# Patient Record
Sex: FEMALE | Race: WHITE | NOT HISPANIC OR LATINO | Employment: OTHER | URBAN - NONMETROPOLITAN AREA
[De-identification: names, ages, dates, MRNs, and addresses within clinical notes are randomized per-mention and may not be internally consistent; named-entity substitution may affect disease eponyms.]

---

## 2017-01-07 ENCOUNTER — TRANSCRIBE ORDERS (OUTPATIENT)
Dept: ADMINISTRATIVE | Facility: HOSPITAL | Age: 72
End: 2017-01-07

## 2017-01-07 DIAGNOSIS — Z12.31 VISIT FOR SCREENING MAMMOGRAM: ICD-10-CM

## 2017-01-07 DIAGNOSIS — Z13.9 SCREENING: Primary | ICD-10-CM

## 2017-02-13 ENCOUNTER — HOSPITAL ENCOUNTER (OUTPATIENT)
Dept: MAMMOGRAPHY | Facility: HOSPITAL | Age: 72
Discharge: HOME | End: 2017-02-13
Attending: OBSTETRICS & GYNECOLOGY | Admitting: OBSTETRICS & GYNECOLOGY

## 2017-02-13 DIAGNOSIS — Z12.31 VISIT FOR SCREENING MAMMOGRAM: ICD-10-CM

## 2017-02-13 PROCEDURE — G0202 SCR MAMMO BI INCL CAD: HCPCS

## 2017-02-13 PROCEDURE — 77063 BREAST TOMOSYNTHESIS BI: CPT

## 2017-03-02 ENCOUNTER — TRANSCRIBE ORDERS (OUTPATIENT)
Dept: ULTRASOUND IMAGING | Facility: HOSPITAL | Age: 72
End: 2017-03-02

## 2017-03-02 DIAGNOSIS — R92.8 ABNORMAL MAMMOGRAM, UNSPECIFIED: Primary | ICD-10-CM

## 2017-03-13 ENCOUNTER — HOSPITAL ENCOUNTER (OUTPATIENT)
Dept: ULTRASOUND IMAGING | Facility: HOSPITAL | Age: 72
Discharge: HOME | End: 2017-03-13
Attending: MIDWIFE | Admitting: MIDWIFE

## 2017-03-13 DIAGNOSIS — R92.8 ABNORMAL MAMMOGRAM, UNSPECIFIED: ICD-10-CM

## 2017-03-13 PROCEDURE — 76642 ULTRASOUND BREAST LIMITED: CPT

## 2017-05-09 ENCOUNTER — OFFICE VISIT (OUTPATIENT)
Dept: INTERNAL MEDICINE | Facility: CLINIC | Age: 72
End: 2017-05-09

## 2017-05-09 VITALS
DIASTOLIC BLOOD PRESSURE: 70 MMHG | BODY MASS INDEX: 26.9 KG/M2 | HEART RATE: 84 BPM | TEMPERATURE: 98.5 F | SYSTOLIC BLOOD PRESSURE: 132 MMHG | OXYGEN SATURATION: 98 % | WEIGHT: 171.38 LBS | HEIGHT: 67 IN

## 2017-05-09 DIAGNOSIS — E78.5 DYSLIPIDEMIA: ICD-10-CM

## 2017-05-09 DIAGNOSIS — E03.8 OTHER SPECIFIED HYPOTHYROIDISM: Primary | ICD-10-CM

## 2017-05-09 DIAGNOSIS — E55.9 VITAMIN D DEFICIENCY: ICD-10-CM

## 2017-05-09 DIAGNOSIS — E03.9 HYPOTHYROIDISM (ACQUIRED): ICD-10-CM

## 2017-05-09 DIAGNOSIS — F32.9 REACTIVE DEPRESSION: ICD-10-CM

## 2017-05-09 DIAGNOSIS — I83.93 VARICOSE VEINS OF BOTH LOWER EXTREMITIES: ICD-10-CM

## 2017-05-09 DIAGNOSIS — Z91.09 ENVIRONMENTAL ALLERGIES: ICD-10-CM

## 2017-05-09 PROCEDURE — 99213 OFFICE O/P EST LOW 20 MIN: CPT | Performed by: INTERNAL MEDICINE

## 2017-05-09 RX ORDER — BUSPIRONE HYDROCHLORIDE 30 MG/1
TABLET ORAL
Qty: 180 TABLET | Refills: 1 | Status: SHIPPED | OUTPATIENT
Start: 2017-05-09 | End: 2017-05-10 | Stop reason: SDUPTHER

## 2017-05-09 RX ORDER — LEVOTHYROXINE SODIUM 0.12 MG/1
125 TABLET ORAL DAILY
Qty: 90 TABLET | Refills: 3 | Status: SHIPPED | OUTPATIENT
Start: 2017-05-09 | End: 2017-11-09 | Stop reason: SDUPTHER

## 2017-05-09 RX ORDER — LEVOTHYROXINE SODIUM 0.12 MG/1
125 TABLET ORAL DAILY
Qty: 90 TABLET | Refills: 3 | Status: SHIPPED | OUTPATIENT
Start: 2017-05-09 | End: 2017-05-09 | Stop reason: SDUPTHER

## 2017-05-09 RX ORDER — DULOXETIN HYDROCHLORIDE 30 MG/1
30 CAPSULE, DELAYED RELEASE ORAL DAILY
Qty: 90 CAPSULE | Refills: 1 | Status: SHIPPED | OUTPATIENT
Start: 2017-05-09 | End: 2017-05-10 | Stop reason: SDUPTHER

## 2017-05-09 RX ORDER — BUSPIRONE HYDROCHLORIDE 30 MG/1
30 TABLET ORAL 2 TIMES DAILY
Qty: 180 TABLET | Refills: 1 | Status: SHIPPED | OUTPATIENT
Start: 2017-05-09 | End: 2017-05-09 | Stop reason: SDUPTHER

## 2017-05-10 ENCOUNTER — TELEPHONE (OUTPATIENT)
Dept: INTERNAL MEDICINE | Facility: CLINIC | Age: 72
End: 2017-05-10

## 2017-05-10 RX ORDER — DULOXETIN HYDROCHLORIDE 30 MG/1
30 CAPSULE, DELAYED RELEASE ORAL DAILY
Qty: 90 CAPSULE | Refills: 1 | Status: SHIPPED | OUTPATIENT
Start: 2017-05-10 | End: 2017-08-15

## 2017-05-10 RX ORDER — BUSPIRONE HYDROCHLORIDE 30 MG/1
TABLET ORAL
Qty: 180 TABLET | Refills: 1 | Status: SHIPPED | OUTPATIENT
Start: 2017-05-10 | End: 2017-11-09 | Stop reason: SDUPTHER

## 2017-08-15 ENCOUNTER — OFFICE VISIT (OUTPATIENT)
Dept: INTERNAL MEDICINE | Facility: CLINIC | Age: 72
End: 2017-08-15

## 2017-08-15 VITALS
OXYGEN SATURATION: 98 % | HEART RATE: 82 BPM | TEMPERATURE: 98 F | SYSTOLIC BLOOD PRESSURE: 126 MMHG | DIASTOLIC BLOOD PRESSURE: 62 MMHG

## 2017-08-15 DIAGNOSIS — E03.8 OTHER SPECIFIED HYPOTHYROIDISM: Primary | ICD-10-CM

## 2017-08-15 DIAGNOSIS — F41.9 ANXIETY: ICD-10-CM

## 2017-08-15 LAB
T4 FREE SERPL-MCNC: 1.09 NG/DL (ref 0.78–2.19)
TSH SERPL DL<=0.005 MIU/L-ACNC: 0.36 MIU/ML (ref 0.47–4.68)

## 2017-08-15 PROCEDURE — 99213 OFFICE O/P EST LOW 20 MIN: CPT | Performed by: INTERNAL MEDICINE

## 2017-08-15 RX ORDER — DULOXETIN HYDROCHLORIDE 60 MG/1
60 CAPSULE, DELAYED RELEASE ORAL DAILY
Qty: 30 CAPSULE | Refills: 1 | Status: SHIPPED | OUTPATIENT
Start: 2017-08-15 | End: 2017-11-09 | Stop reason: SDUPTHER

## 2017-08-15 NOTE — PROGRESS NOTES
Chief Complaint   Patient presents with   • Anxiety     and not sleeping well.      Subjective   Daxa Reyes is a 71 y.o. female.     HPI Comments: Patient is here today with complaints of anxiety and poor sleep.  She is currently on Cymbalta 30 mg daily and BuSpar 30 mg when necessary.  She is feeling very tense and nervous.  She has felt like this in the past when her thyroid was abnormal.  She takes the buspar usually once a day only.  She denies heat or cold intolerance.  No hair loss.  No voice change.  No alternating diarrhea or constipation.          The following portions of the patient's history were reviewed and updated as appropriate: allergies, current medications, past family history, past medical history, past social history, past surgical history and problem list.    Review of Systems   Constitutional: Negative for unexpected weight change.   Gastrointestinal: Negative for constipation and diarrhea.   Endocrine: Negative for cold intolerance and heat intolerance.   Psychiatric/Behavioral: Positive for agitation. Negative for dysphoric mood and sleep disturbance. The patient is nervous/anxious.    All other systems reviewed and are negative.      Objective   /62  Pulse 82  Temp 98 °F (36.7 °C)  SpO2 98%  There is no height or weight on file to calculate BMI.  Physical Exam   Constitutional: She is oriented to person, place, and time. She appears well-developed and well-nourished.   HENT:   Head: Normocephalic and atraumatic.   Neck: Normal range of motion. Neck supple. No thyromegaly present.   Cardiovascular: Normal rate.    Pulmonary/Chest: Effort normal.   Lymphadenopathy:     She has no cervical adenopathy.   Neurological: She is alert and oriented to person, place, and time.   Psychiatric: Judgment normal.   Appears anxious and irritable   Nursing note and vitals reviewed.      Assessment/Plan   Daxa Reyes is here today and the following problems have been  addressed:      Daxa was seen today for anxiety.    Diagnoses and all orders for this visit:    Other specified hypothyroidism  -     T4, Free  -     TSH    Anxiety  -     T4, Free  -     TSH    Other orders  -     DULoxetine (CYMBALTA) 60 MG capsule; Take 1 capsule by mouth Daily.    Labs as noted  Increase cymbalta to 60 mg q day  Use buspar twice a day as needed  Recommend she take her vit D 2000 u daily  Encouragement and reassurance given    RTC 6 weeks    Please note that portions of this note were completed with a voice recognition program.  Efforts were made to edit dictation, but occasionally words are mistranscribed.

## 2017-08-16 DIAGNOSIS — E03.8 OTHER SPECIFIED HYPOTHYROIDISM: Primary | ICD-10-CM

## 2017-08-22 ENCOUNTER — TRANSCRIBE ORDERS (OUTPATIENT)
Dept: ADMINISTRATIVE | Facility: HOSPITAL | Age: 72
End: 2017-08-22

## 2017-08-22 DIAGNOSIS — Z13.9 SCREENING: Primary | ICD-10-CM

## 2017-08-25 ENCOUNTER — HOSPITAL ENCOUNTER (OUTPATIENT)
Dept: ULTRASOUND IMAGING | Facility: HOSPITAL | Age: 72
Discharge: HOME | End: 2017-08-25
Attending: INTERNAL MEDICINE | Admitting: INTERNAL MEDICINE

## 2017-08-25 PROCEDURE — 76536 US EXAM OF HEAD AND NECK: CPT

## 2017-09-08 ENCOUNTER — HOSPITAL ENCOUNTER (OUTPATIENT)
Dept: MAMMOGRAPHY | Facility: HOSPITAL | Age: 72
Discharge: HOME | End: 2017-09-08

## 2017-09-08 DIAGNOSIS — Z13.9 SCREENING: ICD-10-CM

## 2017-11-09 ENCOUNTER — OFFICE VISIT (OUTPATIENT)
Dept: INTERNAL MEDICINE | Facility: CLINIC | Age: 72
End: 2017-11-09

## 2017-11-09 VITALS
WEIGHT: 172 LBS | BODY MASS INDEX: 27 KG/M2 | HEIGHT: 67 IN | TEMPERATURE: 98.5 F | DIASTOLIC BLOOD PRESSURE: 62 MMHG | OXYGEN SATURATION: 98 % | HEART RATE: 81 BPM | SYSTOLIC BLOOD PRESSURE: 120 MMHG

## 2017-11-09 DIAGNOSIS — E03.9 HYPOTHYROIDISM (ACQUIRED): ICD-10-CM

## 2017-11-09 DIAGNOSIS — Z12.2 ENCOUNTER FOR SCREENING FOR LUNG CANCER: ICD-10-CM

## 2017-11-09 DIAGNOSIS — J44.9 MODERATE COPD (CHRONIC OBSTRUCTIVE PULMONARY DISEASE) (HCC): ICD-10-CM

## 2017-11-09 DIAGNOSIS — F17.200 TOBACCO DEPENDENCE SYNDROME: Primary | ICD-10-CM

## 2017-11-09 DIAGNOSIS — E03.8 OTHER SPECIFIED HYPOTHYROIDISM: ICD-10-CM

## 2017-11-09 DIAGNOSIS — Z87.891 PERSONAL HISTORY OF NICOTINE DEPENDENCE: ICD-10-CM

## 2017-11-09 DIAGNOSIS — F32.89 OTHER DEPRESSION: ICD-10-CM

## 2017-11-09 PROCEDURE — 99214 OFFICE O/P EST MOD 30 MIN: CPT | Performed by: INTERNAL MEDICINE

## 2017-11-09 RX ORDER — BUSPIRONE HYDROCHLORIDE 30 MG/1
TABLET ORAL
Qty: 180 TABLET | Refills: 1 | Status: SHIPPED | OUTPATIENT
Start: 2017-11-09 | End: 2017-11-16 | Stop reason: SDUPTHER

## 2017-11-09 RX ORDER — DULOXETIN HYDROCHLORIDE 60 MG/1
60 CAPSULE, DELAYED RELEASE ORAL DAILY
Qty: 90 CAPSULE | Refills: 1 | Status: SHIPPED | OUTPATIENT
Start: 2017-11-09 | End: 2017-11-09 | Stop reason: SDUPTHER

## 2017-11-09 RX ORDER — DULOXETIN HYDROCHLORIDE 30 MG/1
30 CAPSULE, DELAYED RELEASE ORAL DAILY
Qty: 30 CAPSULE | Refills: 0 | Status: SHIPPED | OUTPATIENT
Start: 2017-11-09 | End: 2017-11-09 | Stop reason: SDUPTHER

## 2017-11-09 RX ORDER — LEVOTHYROXINE SODIUM 0.12 MG/1
125 TABLET ORAL DAILY
Qty: 90 TABLET | Refills: 3 | Status: SHIPPED | OUTPATIENT
Start: 2017-11-09 | End: 2017-11-16 | Stop reason: SDUPTHER

## 2017-11-09 RX ORDER — DULOXETIN HYDROCHLORIDE 30 MG/1
30 CAPSULE, DELAYED RELEASE ORAL DAILY
Qty: 90 CAPSULE | Refills: 3 | Status: SHIPPED | OUTPATIENT
Start: 2017-11-09 | End: 2018-04-09

## 2017-11-09 NOTE — PROGRESS NOTES
"Chief Complaint   Patient presents with   • Follow-up     6 months for Hypothyroidism and depression. No new complaints.      Subjective   Daxa Reyes is a 71 y.o. female.     HPI Comments: Here for followup of hypothyroid and depression.   Last visit we increased cymbalta to 60 mg q day.   Her thyroid levels were in acceptable range and US of thyroid was OK with no nodules.   She never increased her cymbalta dose.   She resumed her estradiol and states that made her feel better.  She had been out of it for a week or two and when she resumed it her anxiety and depression resolved.   She denies constipation, diarrhea, hair loss, change in voice.   She has smoked for over 40 yrs over 1/2 PPD.  Still smokes.  She is agreeable to low dose CT for screening for lung CA.   Her mammogram is due in Feb 2018.            The following portions of the patient's history were reviewed and updated as appropriate: allergies, current medications, past family history, past medical history, past social history, past surgical history and problem list.    Review of Systems   Respiratory: Negative.    Gastrointestinal: Negative.    Endocrine: Negative for cold intolerance and heat intolerance.   Psychiatric/Behavioral: Negative for dysphoric mood. The patient is not nervous/anxious.    All other systems reviewed and are negative.      Objective   /62  Pulse 81  Temp 98.5 °F (36.9 °C)  Ht 67\" (170.2 cm)  Wt 172 lb (78 kg)  SpO2 98%  BMI 26.94 kg/m2  Body mass index is 26.94 kg/(m^2).  Physical Exam   Constitutional: She is oriented to person, place, and time. She appears well-developed and well-nourished.   HENT:   Head: Normocephalic and atraumatic.   Mouth/Throat: Oropharynx is clear and moist.   Eyes: Conjunctivae and EOM are normal. Pupils are equal, round, and reactive to light.   Neck: Normal range of motion. Neck supple. No thyromegaly present.   Cardiovascular: Normal rate, regular rhythm, normal heart sounds " and intact distal pulses.    No murmur heard.  Pulmonary/Chest: Effort normal. No respiratory distress.   Slightly decreased breath sounds throughout   Abdominal: Soft. Bowel sounds are normal.   Musculoskeletal: She exhibits no edema.   Lymphadenopathy:     She has no cervical adenopathy.   Neurological: She is alert and oriented to person, place, and time. No cranial nerve deficit.   Psychiatric: She has a normal mood and affect. Judgment normal.   Nursing note and vitals reviewed.      Assessment/Plan   Daxa Reyes is here today and the following problems have been addressed:      Daxa was seen today for follow-up.    Diagnoses and all orders for this visit:    Tobacco dependence syndrome    Hypothyroidism (acquired)  -     levothyroxine (SYNTHROID, LEVOTHROID) 125 MCG tablet; Take 1 tablet by mouth Daily.    Moderate COPD (chronic obstructive pulmonary disease)    Other specified hypothyroidism    Other depression    Encounter for screening for lung cancer    Other orders  -     busPIRone (BUSPAR) 30 MG tablet; 1/2 tab BID prn  -     Discontinue: DULoxetine (CYMBALTA) 60 MG capsule; Take 1 capsule by mouth Daily.  -     Discontinue: DULoxetine (CYMBALTA) 30 MG capsule; Take 1 capsule by mouth Daily.  -     DULoxetine (CYMBALTA) 30 MG capsule; Take 1 capsule by mouth Daily.    continue cymbalta 30 mg q day  Will order CT low dose lung for CA screening  Her depression is well controlled  Encouraged her to cut back on smoking  Her thyroid labs are in acceptable range    RTC 6 mo      Please note that portions of this note were completed with a voice recognition program.  Efforts were made to edit dictation, but occasionally words are mistranscribed.

## 2017-11-16 DIAGNOSIS — E03.9 HYPOTHYROIDISM (ACQUIRED): ICD-10-CM

## 2017-11-16 RX ORDER — LEVOTHYROXINE SODIUM 0.12 MG/1
125 TABLET ORAL DAILY
Qty: 90 TABLET | Refills: 3 | Status: SHIPPED | OUTPATIENT
Start: 2017-11-16 | End: 2018-04-17 | Stop reason: SDUPTHER

## 2017-11-16 RX ORDER — BUSPIRONE HYDROCHLORIDE 30 MG/1
TABLET ORAL
Qty: 180 TABLET | Refills: 1 | Status: SHIPPED | OUTPATIENT
Start: 2017-11-16 | End: 2019-04-26 | Stop reason: SDUPTHER

## 2017-11-22 ENCOUNTER — HOSPITAL ENCOUNTER (OUTPATIENT)
Dept: CT IMAGING | Facility: HOSPITAL | Age: 72
Discharge: HOME | End: 2017-11-22
Admitting: INTERNAL MEDICINE

## 2017-11-22 DIAGNOSIS — Z87.891 PERSONAL HISTORY OF NICOTINE DEPENDENCE: ICD-10-CM

## 2017-11-22 DIAGNOSIS — F17.200 TOBACCO DEPENDENCE SYNDROME: ICD-10-CM

## 2017-11-22 DIAGNOSIS — J44.9 MODERATE COPD (CHRONIC OBSTRUCTIVE PULMONARY DISEASE) (HCC): ICD-10-CM

## 2017-11-22 DIAGNOSIS — Z12.2 ENCOUNTER FOR SCREENING FOR LUNG CANCER: ICD-10-CM

## 2017-11-22 PROCEDURE — G0297 LDCT FOR LUNG CA SCREEN: HCPCS

## 2017-11-27 ENCOUNTER — TELEPHONE (OUTPATIENT)
Dept: INTERNAL MEDICINE | Facility: CLINIC | Age: 72
End: 2017-11-27

## 2017-11-27 NOTE — TELEPHONE ENCOUNTER
----- Message from Marilyn K Vermeesch, MD sent at 11/22/2017  4:14 PM EST -----  Please tell patient lung CT is negative.

## 2017-11-27 NOTE — TELEPHONE ENCOUNTER
Notes Recorded by Taylor Pruitt MA on 11/22/2017 at 5:17 PM  Attempted contacting Pt, unavailable.  ------

## 2017-11-30 ENCOUNTER — APPOINTMENT (OUTPATIENT)
Dept: CT IMAGING | Facility: HOSPITAL | Age: 72
End: 2017-11-30
Attending: INTERNAL MEDICINE

## 2017-12-01 ENCOUNTER — OFFICE VISIT (OUTPATIENT)
Dept: NEUROLOGY | Facility: CLINIC | Age: 72
End: 2017-12-01

## 2017-12-01 VITALS
HEART RATE: 74 BPM | BODY MASS INDEX: 27 KG/M2 | WEIGHT: 172 LBS | SYSTOLIC BLOOD PRESSURE: 114 MMHG | OXYGEN SATURATION: 98 % | DIASTOLIC BLOOD PRESSURE: 62 MMHG | HEIGHT: 67 IN

## 2018-01-04 NOTE — PROGRESS NOTES
This encounter was created in error - please disregard.  This encounter was created in error - please disregard.  This encounter was created in error - please disregard.  This encounter was created in error - please disregard.  This encounter was created in error - please disregard.

## 2018-01-05 ENCOUNTER — OFFICE VISIT (OUTPATIENT)
Dept: NEUROLOGY | Facility: CLINIC | Age: 73
End: 2018-01-05

## 2018-01-05 VITALS
DIASTOLIC BLOOD PRESSURE: 50 MMHG | OXYGEN SATURATION: 98 % | BODY MASS INDEX: 27 KG/M2 | SYSTOLIC BLOOD PRESSURE: 146 MMHG | WEIGHT: 172 LBS | HEIGHT: 67 IN | HEART RATE: 96 BPM

## 2018-01-05 DIAGNOSIS — G47.33 OSA (OBSTRUCTIVE SLEEP APNEA): Primary | ICD-10-CM

## 2018-01-05 PROCEDURE — 99213 OFFICE O/P EST LOW 20 MIN: CPT | Performed by: NURSE PRACTITIONER

## 2018-01-05 NOTE — PROGRESS NOTES
Subjective   Daxa Reyes is a 72 y.o. female    Chief Complaint   Patient presents with   • Follow-up     Pt is here for a Fu for CPAP complaince. Pt states that she is using her machine like she should and has gotten the supplies she needs.        History of Present Illness     Patient is a very pleasant 72-year-old female in clinic today to follow-up for her CPAP compliance due to her sleep apnea.  Sleep compliance report reviewed in clinic patient is meeting all standards.  AHI is below 5 patient is using her machine greater than 70% of the time for more than 4 hours.  Patient states that she does feel more rested energy during the day.  Patient continues to exercise at the [facility] 3-4 days week patient also volunteers with the teachers at school.  Patient is a retired teacher.    The following portions of the patient's history were reviewed and updated as appropriate: allergies, current medications, past family history, past medical history, past social history, past surgical history and problem list.    Review of Systems   Constitutional: Negative for chills and fever.   HENT: Negative for congestion, ear pain, hearing loss, rhinorrhea and sore throat.    Eyes: Negative for pain, discharge and redness.   Respiratory: Negative for cough, shortness of breath, wheezing and stridor.    Cardiovascular: Negative for chest pain, palpitations and leg swelling.   Gastrointestinal: Negative for abdominal pain, constipation, nausea and vomiting.   Endocrine: Negative for cold intolerance, heat intolerance and polyphagia.   Genitourinary: Negative for dysuria, flank pain, frequency and urgency.   Musculoskeletal: Negative for joint swelling, myalgias, neck pain and neck stiffness.   Skin: Negative for pallor, rash and wound.   Allergic/Immunologic: Negative for environmental allergies.   Neurological: Negative for dizziness, tremors, seizures, syncope, facial asymmetry, speech difficulty, weakness, light-headedness,  "numbness and headaches.   Hematological: Negative for adenopathy.   Psychiatric/Behavioral: Positive for sleep disturbance. Negative for confusion and hallucinations. The patient is nervous/anxious.        Objective         Vitals:    01/05/18 1347   BP: 146/50   Pulse: 96   SpO2: 98%   Weight: 78 kg (172 lb)   Height: 170.2 cm (67\")     GENERAL: Patient is pleasant, cooperative, appears to be stated age.  Body habitus is endomorphic.  HEAD:  Head is normocephalic and atraumatic.    NECK: Neck are non-tender without thyromegaly or adenopathy.  Carotic upstrokes are 1+/4.  No cranial or cervical bruits.  The neck is supple with a full range of motion.   CARDIOVASCULAR:  Regular rate and rhythm with normal S1 and S2 without rub or gallop.  RESPIRATORY:  Clear to auscultation without wheezes or crackle   PSYCH:  Higher cortical function/mental status:  The patient is alert.  She  is oriented x3 to time, place and person.  Recent and the remote memory appear normal.  The patient has a good fund of knowledge.  There is no visual or auditory hallucination or suicidal or homicidal ideation.  SPEECH:There is no gross evidence of aphasia, dysarthria or agnosia.      COORDINATION AND GAIT:  The patient walks with a narrow-based gait.      Results for orders placed or performed in visit on 08/15/17   T4, Free   Result Value Ref Range    Free T4 1.09 0.78 - 2.19 ng/dL   TSH   Result Value Ref Range    TSH 0.357 (L) 0.470 - 4.680 mIU/mL       I have personally reviewed the above labs and imaging studies.    Assessment/Plan     1. ANA LAURA - discuss complaince data and the shirin to increase her CPAp pressure to 8 cm H2O pressure.  I have counseled patient extensively on Sleep Hygiene including regular sleep wake schedule and stimulus control therapy. I have also discussed the importance of weight reduction because 10% reduction in body weight can reduce sleep apnea by 50 %. We have also discussed abstaining from smoking and drinking.  I " have explained to patient that obstructive apnea episode is defined as the absence of airflow for at least 10 seconds.  Sleep apnea is usually accompanied by snoring, disturbed sleep, and daytime sleepiness. Patients with micrognathia, retrognathia, enlarged tonsils, tongue enlargement, and acromegaly are especially predisposed to obstructive sleep apnea. Abnormalities or weakness in the muscles can also contribute to obstructive sleep apnea. Obesity can also contribute to sleep apnea.  Sleep apnea can lead to a number of complications, ranging from daytime sleepiness to possible increased risk of cardiovascular risks.    Daytime sleepiness is the most serious. Daytime sleepiness can also increase the risk for accident-related injuries. Several studies have suggested that people with sleep apnea have two to three times as many car accidents, and five to seven times the risk for multiple accidents.    A number of cardiovascular diseases -- including high blood pressure, heart failure, stroke, and heart arrhythmias -- have an association with obstructive sleep apnea.    Up to a third of patients with heart failure also have sleep apnea. Both central and obstructive sleep apnea are linked with heart failure. Obstructive sleep apnea is also noted to be associated with type 2 diabetes according to Dr. Mcneil at [facility].  The best treatment for symptomatic obstructive sleep apnea is continuous positive airflow pressure (CPAP). Bilevel positive airway pressure (BPAP) systems may be particularly helpful for patients with coexisting lung disease and those with excessive levels of carbon dioxide.  Other treatment options including UPPP surgery, LAUP surgery, radiofrequency somnoplasty and dental appliances such Jennie or Clearway.  2. hypothyroid - cont meds and f/u with pcp   Reviewed lab tests .

## 2018-04-09 ENCOUNTER — OFFICE VISIT (OUTPATIENT)
Dept: INTERNAL MEDICINE | Facility: CLINIC | Age: 73
End: 2018-04-09

## 2018-04-09 VITALS
HEIGHT: 67 IN | TEMPERATURE: 98.4 F | HEART RATE: 80 BPM | SYSTOLIC BLOOD PRESSURE: 142 MMHG | OXYGEN SATURATION: 99 % | BODY MASS INDEX: 28.25 KG/M2 | DIASTOLIC BLOOD PRESSURE: 84 MMHG | WEIGHT: 180 LBS

## 2018-04-09 DIAGNOSIS — J43.2 CENTRILOBULAR EMPHYSEMA (HCC): Primary | ICD-10-CM

## 2018-04-09 DIAGNOSIS — E78.5 DYSLIPIDEMIA: ICD-10-CM

## 2018-04-09 DIAGNOSIS — E03.8 OTHER SPECIFIED HYPOTHYROIDISM: ICD-10-CM

## 2018-04-09 DIAGNOSIS — E55.9 VITAMIN D DEFICIENCY: ICD-10-CM

## 2018-04-09 DIAGNOSIS — Z23 NEED FOR PNEUMOCOCCAL VACCINATION: ICD-10-CM

## 2018-04-09 PROCEDURE — 90670 PCV13 VACCINE IM: CPT | Performed by: PHYSICIAN ASSISTANT

## 2018-04-09 PROCEDURE — 99214 OFFICE O/P EST MOD 30 MIN: CPT | Performed by: PHYSICIAN ASSISTANT

## 2018-04-09 PROCEDURE — G0009 ADMIN PNEUMOCOCCAL VACCINE: HCPCS | Performed by: PHYSICIAN ASSISTANT

## 2018-04-09 RX ORDER — DULOXETIN HYDROCHLORIDE 30 MG/1
30 CAPSULE, DELAYED RELEASE ORAL DAILY
Qty: 90 CAPSULE | Refills: 3 | Status: SHIPPED | OUTPATIENT
Start: 2018-04-09 | End: 2019-04-26 | Stop reason: SDUPTHER

## 2018-04-09 NOTE — PROGRESS NOTES
Daxa Reyes is a 72 y.o. female.     Subjective   History of Present Illness   Here today with concern of COPD. She has been diagnosed with COPD with low-dose CT screening and reports that she quit smoking about 1 month ago. She denies any dyspnea on exertion or chronic cough. She has heard herself wheezing on occasion, particularly when laying down.  Patient denies chest pain, orthopnea, palpitations, lower extremity edema, headaches, weakness, visual disturbances or confusion.              The following portions of the patient's history were reviewed and updated as appropriate: allergies, current medications, past family history, past medical history, past social history, past surgical history and problem list.    Review of Systems    Constitutional: Negative for appetite change, chills, fatigue, fever and unexpected weight change.   HENT: Negative for congestion, ear pain, hearing loss, nosebleeds, postnasal drip, rhinorrhea, sore throat, tinnitus and trouble swallowing.    Eyes: Negative for photophobia, discharge and visual disturbance.   Respiratory: wheezing.  Negative for cough, chest tightness, shortness of breath.  Cardiovascular: Negative for chest pain, palpitations and leg swelling.   Gastrointestinal: Negative for abdominal distention, abdominal pain, blood in stool, constipation, diarrhea, nausea and vomiting.   Endocrine: Negative for cold intolerance, heat intolerance, polydipsia, polyphagia and polyuria.   Musculoskeletal: Negative for arthralgias, back pain, joint swelling, myalgias, neck pain and neck stiffness.   Skin: Negative for color change, pallor, rash and wound.   Allergic/Immunologic: Negative for environmental allergies, food allergies and immunocompromised state.   Neurological: Negative for dizziness, tremors, seizures, weakness, numbness and headaches.   Hematological: Negative for adenopathy. Does not bruise/bleed easily.   Psychiatric/Behavioral: Negative for sleep  "disturbances, agitation, behavioral problems, confusion, hallucinations, self-injury and suicidal ideas. The patient is not nervous/anxious.        Objective    Physical Exam  Constitutional: Oriented to person, place, and time. Appears well-developed and well-nourished.   HENT: OP normal, TMs normal.   Head: Normocephalic and atraumatic.   Eyes: EOM are normal. Pupils are equal, round, and reactive to light.   Neck: Normal range of motion. Neck supple.   Cardiovascular: Normal rate, regular rhythm and normal heart sounds.    Pulmonary/Chest: Effort normal and breath sounds normal. No respiratory distress.  Has no wheezes or rales. Exhibits no chest wall tenderness.   Abdominal: Soft. Bowel sounds are normal. Exhibits no distension and no mass. There is no tenderness.   Musculoskeletal: Normal range of motion. Exhibits no tenderness.   Neurological: Alert and oriented to person, place, and time.   Skin: Skin is warm and dry.   Psychiatric: Has a normal mood and affect. Behavior is normal. Judgment and thought content normal.       /84   Pulse 80   Temp 98.4 °F (36.9 °C)   Ht 170.2 cm (67.01\")   Wt 81.6 kg (180 lb)   SpO2 99%   BMI 28.19 kg/m²     Nursing note and vitals reviewed.           Assessment/Plan   Daxa was seen today for follow-up.    Diagnoses and all orders for this visit:    Centrilobular emphysema  -     Spirometry With Bronchodilator  Evaluate extent of disease. She quit smoking 1 month ago. Generally asymptomatic.     Dyslipidemia  -     Lipid Panel  -     Comprehensive Metabolic Panel    Other specified hypothyroidism  -     TSH  -     T4  -     T3, Free    Vitamin D deficiency  -     Vitamin D 25 Hydroxy    Need for pneumococcal vaccination  -     Pneumococcal Conjugate Vaccine 13-Valent All      F/u with Dr. Vermeesch in for medicare wellness visit in 6 months.            "

## 2018-04-11 ENCOUNTER — HOSPITAL ENCOUNTER (OUTPATIENT)
Dept: PULMONOLOGY | Facility: HOSPITAL | Age: 73
Discharge: HOME | End: 2018-04-11
Admitting: PHYSICIAN ASSISTANT

## 2018-04-11 PROCEDURE — 94060 EVALUATION OF WHEEZING: CPT

## 2018-04-11 PROCEDURE — 94640 AIRWAY INHALATION TREATMENT: CPT

## 2018-04-11 RX ORDER — ALBUTEROL SULFATE 2.5 MG/3ML
2.5 SOLUTION RESPIRATORY (INHALATION) ONCE
Status: COMPLETED | OUTPATIENT
Start: 2018-04-11 | End: 2018-04-11

## 2018-04-11 RX ADMIN — ALBUTEROL SULFATE 2.5 MG: 2.5 SOLUTION RESPIRATORY (INHALATION) at 09:35

## 2018-04-13 LAB
25(OH)D3+25(OH)D2 SERPL-MCNC: 22.4 NG/ML
ALBUMIN SERPL-MCNC: 3.6 G/DL (ref 3.5–5)
ALBUMIN/GLOB SERPL: 1.2 G/DL (ref 1–2)
ALP SERPL-CCNC: 79 U/L (ref 38–126)
ALT SERPL-CCNC: 24 U/L (ref 13–69)
AST SERPL-CCNC: 23 U/L (ref 15–46)
BILIRUB SERPL-MCNC: 1 MG/DL (ref 0.2–1.3)
BUN SERPL-MCNC: 11 MG/DL (ref 7–20)
BUN/CREAT SERPL: 13.8 (ref 7.1–23.5)
CALCIUM SERPL-MCNC: 8.6 MG/DL (ref 8.4–10.2)
CHLORIDE SERPL-SCNC: 104 MMOL/L (ref 98–107)
CHOLEST SERPL-MCNC: 192 MG/DL (ref 0–199)
CO2 SERPL-SCNC: 31 MMOL/L (ref 26–30)
CREAT SERPL-MCNC: 0.8 MG/DL (ref 0.6–1.3)
GFR SERPLBLD CREATININE-BSD FMLA CKD-EPI: 70 ML/MIN/1.73
GFR SERPLBLD CREATININE-BSD FMLA CKD-EPI: 85 ML/MIN/1.73
GLOBULIN SER CALC-MCNC: 3 GM/DL
GLUCOSE SERPL-MCNC: 97 MG/DL (ref 74–98)
HDLC SERPL-MCNC: 63 MG/DL (ref 40–60)
LDLC SERPL CALC-MCNC: 114 MG/DL (ref 0–99)
POTASSIUM SERPL-SCNC: 4.1 MMOL/L (ref 3.5–5.1)
PROT SERPL-MCNC: 6.6 G/DL (ref 6.3–8.2)
SODIUM SERPL-SCNC: 144 MMOL/L (ref 137–145)
T3FREE SERPL-MCNC: 2.4 PG/ML (ref 2–4.4)
T4 SERPL-MCNC: 7.4 UG/DL (ref 4.5–12)
TRIGL SERPL-MCNC: 77 MG/DL
TSH SERPL DL<=0.005 MIU/L-ACNC: 5.14 MIU/ML (ref 0.47–4.68)
VLDLC SERPL CALC-MCNC: 15.4 MG/DL

## 2018-04-17 DIAGNOSIS — E03.9 HYPOTHYROIDISM (ACQUIRED): ICD-10-CM

## 2018-04-17 RX ORDER — LEVOTHYROXINE SODIUM 137 UG/1
125 TABLET ORAL DAILY
Qty: 90 TABLET | Refills: 1 | Status: SHIPPED | OUTPATIENT
Start: 2018-04-17 | End: 2018-08-17 | Stop reason: SDUPTHER

## 2018-05-16 RX ORDER — BUDESONIDE AND FORMOTEROL FUMARATE DIHYDRATE 80; 4.5 UG/1; UG/1
2 AEROSOL RESPIRATORY (INHALATION)
Qty: 3 INHALER | Refills: 3 | Status: SHIPPED | OUTPATIENT
Start: 2018-05-16 | End: 2018-10-22

## 2018-05-16 RX ORDER — ALBUTEROL SULFATE 90 UG/1
2 AEROSOL, METERED RESPIRATORY (INHALATION) EVERY 4 HOURS PRN
Qty: 2 INHALER | Refills: 3 | Status: SHIPPED | OUTPATIENT
Start: 2018-05-16 | End: 2021-02-09

## 2018-08-17 DIAGNOSIS — E03.9 HYPOTHYROIDISM (ACQUIRED): ICD-10-CM

## 2018-08-17 RX ORDER — LEVOTHYROXINE SODIUM 137 UG/1
125 TABLET ORAL DAILY
Qty: 90 TABLET | Refills: 1 | Status: SHIPPED | OUTPATIENT
Start: 2018-08-17 | End: 2019-04-26 | Stop reason: SDUPTHER

## 2018-08-17 RX ORDER — LEVOTHYROXINE SODIUM 137 UG/1
125 TABLET ORAL DAILY
Qty: 90 TABLET | Refills: 1 | Status: SHIPPED | OUTPATIENT
Start: 2018-08-17 | End: 2018-08-17 | Stop reason: SDUPTHER

## 2018-10-22 ENCOUNTER — OFFICE VISIT (OUTPATIENT)
Dept: INTERNAL MEDICINE | Facility: CLINIC | Age: 73
End: 2018-10-22

## 2018-10-22 VITALS
SYSTOLIC BLOOD PRESSURE: 132 MMHG | HEIGHT: 67 IN | DIASTOLIC BLOOD PRESSURE: 78 MMHG | WEIGHT: 182 LBS | BODY MASS INDEX: 28.56 KG/M2 | OXYGEN SATURATION: 98 % | HEART RATE: 75 BPM | TEMPERATURE: 98.6 F

## 2018-10-22 DIAGNOSIS — J44.9 COPD, MILD (HCC): ICD-10-CM

## 2018-10-22 DIAGNOSIS — F17.210 CIGARETTE SMOKER: ICD-10-CM

## 2018-10-22 DIAGNOSIS — E03.8 OTHER SPECIFIED HYPOTHYROIDISM: ICD-10-CM

## 2018-10-22 DIAGNOSIS — I83.93 VARICOSE VEINS OF BOTH LOWER EXTREMITIES, UNSPECIFIED WHETHER COMPLICATED: ICD-10-CM

## 2018-10-22 DIAGNOSIS — Z00.00 MEDICARE ANNUAL WELLNESS VISIT, SUBSEQUENT: Primary | ICD-10-CM

## 2018-10-22 PROCEDURE — 99397 PER PM REEVAL EST PAT 65+ YR: CPT | Performed by: PHYSICIAN ASSISTANT

## 2018-10-22 PROCEDURE — G0439 PPPS, SUBSEQ VISIT: HCPCS | Performed by: PHYSICIAN ASSISTANT

## 2018-10-22 NOTE — PROGRESS NOTES
QUICK REFERENCE INFORMATION:  The ABCs of the Annual Wellness Visit    Subsequent Medicare Wellness Visit    HEALTH RISK ASSESSMENT    1945    Recent Hospitalizations:  No hospitalization(s) within the last year.        Current Medical Providers:  Patient Care Team:  Vermeesch, Marilyn K, MD as PCP - General (Internal Medicine)        Smoking Status:  History   Smoking Status   • Former Smoker   • Packs/day: 0.50   • Years: 60.00   • Types: Cigarettes   • Quit date: 3/9/2018   Smokeless Tobacco   • Never Used       Alcohol Consumption:  History   Alcohol Use   • Yes     Comment: unknown       Depression Screen:   PHQ-2/PHQ-9 Depression Screening 10/22/2018   Little interest or pleasure in doing things 0   Feeling down, depressed, or hopeless 0   Total Score 0       Health Habits and Functional and Cognitive Screening:  Functional & Cognitive Status 10/22/2018   Do you have difficulty preparing food and eating? No   Do you have difficulty bathing yourself, getting dressed or grooming yourself? No   Do you have difficulty using the toilet? No   Do you have difficulty moving around from place to place? No   Do you have trouble with steps or getting out of a bed or a chair? No   In the past year have you fallen or experienced a near fall? No   Current Diet Well Balanced Diet   Dental Exam Up to date   Eye Exam Up to date   Exercise (times per week) 6 times per week   Current Exercise Activities Include Swimming   Do you need help using the phone?  No   Are you deaf or do you have serious difficulty hearing?  Yes   Do you need help with transportation? Yes   Do you need help shopping? No   Do you need help preparing meals?  No   Do you need help with housework?  No   Do you need help with laundry? No   Do you need help taking your medications? No   Do you need help managing money? No   Do you ever drive or ride in a car without wearing a seat belt? Yes   Have you felt unusual stress, anger or loneliness in the last  month? No   Who do you live with? Spouse   If you need help, do you have trouble finding someone available to you? No   Have you been bothered in the last four weeks by sexual problems? No   Do you have difficulty concentrating, remembering or making decisions? No           Does the patient have evidence of cognitive impairment? No    Aspirin use counseling: Start ASA 81 mg daily       Recent Lab Results:  CMP:  Lab Results   Component Value Date    GLU 96 10/22/2018    BUN 11 10/22/2018    CREATININE 0.80 10/22/2018    EGFRIFNONA 70 10/22/2018    EGFRIFAFRI 85 10/22/2018    BCR 13.8 10/22/2018     (L) 10/22/2018    K 4.2 10/22/2018    CO2 30.0 10/22/2018    CALCIUM 9.8 10/22/2018    PROTENTOTREF 7.4 10/22/2018    ALBUMIN 4.30 10/22/2018    LABGLOBREF 3.1 10/22/2018    LABIL2 1.4 10/22/2018    BILITOT 0.7 10/22/2018    ALKPHOS 90 10/22/2018    AST 33 10/22/2018    ALT 28 10/22/2018     Lipid Panel:  Lab Results   Component Value Date    CHOL 209 (H) 11/16/2016    TRIG 77 04/12/2018    HDL 63 (H) 04/12/2018    VLDL 15.4 04/12/2018     HbA1c:       Visual Acuity:  No exam data present    Age-appropriate Screening Schedule:  Refer to the list below for future screening recommendations based on patient's age, sex and/or medical conditions. Orders for these recommended tests are listed in the plan section. The patient has been provided with a written plan.    Health Maintenance   Topic Date Due   • TDAP/TD VACCINES (1 - Tdap) 12/14/1963   • ZOSTER VACCINE (1 of 2) 12/14/1994   • MAMMOGRAM  03/01/2019   • LIPID PANEL  04/12/2019   • COLONOSCOPY  11/26/2022   • INFLUENZA VACCINE  Completed   • PNEUMOCOCCAL VACCINES (65+ LOW/MEDIUM RISK)  Completed        Subjective   History of Present Illness    Daxa Reyes is a 72 y.o. female who presents for an Subsequent Wellness Visit.    Accidentally ran out of estradiol last year and went without it for around 2 weeks and had mood changes including severe anxiety. She  has also tried to reduce the dose in the past with intolerable side effects per her report. Managed by Dr. Zelaya, OB/Gyn.     She resumed smoking lightly around 1 month ago due to her concern of weight gain after quitting.  She is smoking about 1/3 ppd.  She has a 60 pack year history. She had an initial screening low-dose CT chest 11 months ago.         The following portions of the patient's history were reviewed and updated as appropriate: allergies, current medications, past family history, past medical history, past social history, past surgical history and problem list.    Outpatient Medications Prior to Visit   Medication Sig Dispense Refill   • busPIRone (BUSPAR) 30 MG tablet Take 1/2-1 tablet BID. 180 tablet 1   • Coenzyme Q10 (CO Q-10) 150 MG capsule Take  by mouth.     • DULoxetine (CYMBALTA) 30 MG capsule Take 1 capsule by mouth Daily. 90 capsule 3   • estradiol (ESTRACE) 1 MG tablet Take 1 tablet by mouth Daily. 1/2 tab daily     • levothyroxine (SYNTHROID, LEVOTHROID) 137 MCG tablet Take 1 tablet by mouth Daily. 90 tablet 1   • Multiple Minerals-Vitamins (CALCIUM & VIT D3 BONE HEALTH PO) Take  by mouth.     • Multiple Vitamin (MULTIVITAMIN) capsule Take 1 capsule by mouth daily.     • Omega-3 Fatty Acids (FISH OIL) 1000 MG capsule capsule Take  by mouth daily with breakfast.     • albuterol (PROVENTIL HFA;VENTOLIN HFA) 108 (90 Base) MCG/ACT inhaler Inhale 2 puffs Every 4 (Four) Hours As Needed for Shortness of Air (chest tightness). 2 inhaler 3   • budesonide-formoterol (SYMBICORT) 80-4.5 MCG/ACT inhaler Inhale 2 puffs 2 (Two) Times a Day. Rinse mouth with water after each use. 3 inhaler 3     No facility-administered medications prior to visit.        Patient Active Problem List   Diagnosis   • COPD, mild (CMS/HCC)   • Hypothyroidism   • Vitamin D deficiency   • Dyslipidemia   • ANA LAURA (obstructive sleep apnea)   • Vertigo   • History of arthritis   • History of osteoarthritis   • Renal disorder   •  Xerosis cutis   • Anxiety   • Depressed   • Numbness in feet   • Varicose veins of both lower extremities   • Environmental allergies   • Encounter for screening for lung cancer       Advance Care Planning:  has NO advance directive - information provided to the patient today    Identification of Risk Factors:  Risk factors include: weight , cardiovascular risk and tobacco use.    Review of Systems   Constitutional: Negative for appetite change, chills, fatigue, fever and unexpected weight change.   HENT: Negative for congestion, ear pain, hearing loss, nosebleeds, postnasal drip, rhinorrhea, sore throat, tinnitus and trouble swallowing.    Eyes: Negative for photophobia, discharge and visual disturbance.   Respiratory: Negative for cough, chest tightness, shortness of breath and wheezing.    Cardiovascular: Negative for chest pain, palpitations and leg swelling.   Gastrointestinal: Negative for abdominal distention, abdominal pain, blood in stool, constipation, diarrhea, nausea and vomiting.   Endocrine: Negative for cold intolerance, heat intolerance, polydipsia, polyphagia and polyuria.   Genitourinary: Negative.    Musculoskeletal: Negative for arthralgias, back pain, joint swelling, myalgias, neck pain and neck stiffness.   Skin: Negative for color change, pallor, rash and wound.   Allergic/Immunologic: Negative for environmental allergies, food allergies and immunocompromised state.   Neurological: Negative for dizziness, tremors, seizures, weakness, numbness and headaches.   Hematological: Negative for adenopathy. Does not bruise/bleed easily.   Psychiatric/Behavioral: Negative for agitation, behavioral problems, confusion, hallucinations, self-injury and suicidal ideas. The patient is not nervous/anxious.        Compared to one year ago, the patient feels her physical health is the same.  Compared to one year ago, the patient feels her mental health is the same.    Objective     Physical Exam  "  Constitutional: She is oriented to person, place, and time. She appears well-developed and well-nourished. No distress.   HENT:   Head: Normocephalic and atraumatic.   Right Ear: External ear normal.   Left Ear: External ear normal.   Nose: Nose normal.   Mouth/Throat: Oropharynx is clear and moist.   Eyes: Pupils are equal, round, and reactive to light. EOM are normal. No scleral icterus.   Neck: Normal range of motion. Neck supple. No thyromegaly present.   Cardiovascular: Normal rate, regular rhythm and normal heart sounds.  Exam reveals no gallop and no friction rub.    No murmur heard.  Pulmonary/Chest: Effort normal and breath sounds normal. No respiratory distress. She has no wheezes. She has no rales. She exhibits no tenderness.   Abdominal: Soft. Bowel sounds are normal. She exhibits no distension and no mass. There is no tenderness. No hernia.   Musculoskeletal: Normal range of motion. She exhibits no edema, tenderness or deformity.   Lymphadenopathy:     She has no cervical adenopathy.   Neurological: She is alert and oriented to person, place, and time. She displays normal reflexes. No cranial nerve deficit or sensory deficit. She exhibits normal muscle tone. Coordination normal.   Skin: Skin is warm and dry. Capillary refill takes less than 2 seconds. No rash noted. She is not diaphoretic.   Varicosities noted bilateral lower legs.   Psychiatric: She has a normal mood and affect. Her behavior is normal. Judgment and thought content normal.   Nursing note and vitals reviewed.      Vitals:    10/22/18 1301   BP: 132/78   Pulse: 75   Temp: 98.6 °F (37 °C)   SpO2: 98%   Weight: 82.6 kg (182 lb)   Height: 170.2 cm (67.01\")   PainSc: 0-No pain       Patient's Body mass index is 28.5 kg/m². BMI is above normal parameters. Recommendations include: exercise counseling and nutrition counseling.      Assessment/Plan   Patient Self-Management and Personalized Health Advice  The patient has been provided with " information about: diet, exercise, weight management, tobacco cessation and designing advance directives and preventive services including:   · Advance directive, Fall Risk assessment done, Nutrition counseling provided, Smoking cessation counseling completed.    Visit Diagnoses:    ICD-10-CM ICD-9-CM   1. Medicare annual wellness visit, subsequent Z00.00 V70.0   2. BMI 28.0-28.9,adult - Patient's Body mass index is 28.5 kg/m². BMI is above normal parameters. Recommendations include: exercise counseling and nutrition counseling.   Z68.28 V85.24   3. COPD, mild (CMS/HCC) - managed with albuterol inhaler prn, continue. Strongly encouraged smoking cessation.   J44.9 496   4. Cigarette smoker - strongly encouraged smoking cessation counseling which she declined today. Discussed the long-term implications of continued smoking.    F17.210 305.1   5. Varicose veins of both lower extremities, unspecified whether complicated - referred to vascular surgery per her request.   I83.93 454.9   6. Other specified hypothyroidism - check TSH and T4.  E03.8 244.8       Orders Placed This Encounter   Procedures   • CT Chest Low Dose Wo     Scheduling Instructions:      Do not schedule until after 1/1/19 per patient request.     Order Specific Question:   The patient is age 55-77:     Answer:   72     Order Specific Question:   The patient is a current smoker?     Answer:   Yes     Order Specific Question:   The patient has a smoking history of 30 pack-years or greater:     Answer:   Yes     Order Specific Question:   Actual pack - year smoking history (number):     Answer:   60     Order Specific Question:   Has the Patient had a Chest CT scan within the past 12 months?     Answer:   Yes     Order Specific Question:   Does the patient have any clinical signs/symptoms of lung cancer?     Answer:   No     Order Specific Question:   The patient was engaged in shared decision-making for this test:     Answer:   Yes   • TSH   • T4   •  Comprehensive Metabolic Panel   • Ambulatory Referral to Cardiothoracic Surgery     Referral Priority:   Routine     Referral Type:   Consultation     Referral Reason:   Specialty Services Required     Referred to Provider:   Chaparro Giraldo MD     Requested Specialty:   Cardiothoracic Surgery     Number of Visits Requested:   1       Outpatient Encounter Prescriptions as of 10/22/2018   Medication Sig Dispense Refill   • busPIRone (BUSPAR) 30 MG tablet Take 1/2-1 tablet BID. 180 tablet 1   • Coenzyme Q10 (CO Q-10) 150 MG capsule Take  by mouth.     • DULoxetine (CYMBALTA) 30 MG capsule Take 1 capsule by mouth Daily. 90 capsule 3   • estradiol (ESTRACE) 1 MG tablet Take 1 tablet by mouth Daily. 1/2 tab daily     • levothyroxine (SYNTHROID, LEVOTHROID) 137 MCG tablet Take 1 tablet by mouth Daily. 90 tablet 1   • Multiple Minerals-Vitamins (CALCIUM & VIT D3 BONE HEALTH PO) Take  by mouth.     • Multiple Vitamin (MULTIVITAMIN) capsule Take 1 capsule by mouth daily.     • Omega-3 Fatty Acids (FISH OIL) 1000 MG capsule capsule Take  by mouth daily with breakfast.     • TURMERIC CURCUMIN PO Take  by mouth.     • albuterol (PROVENTIL HFA;VENTOLIN HFA) 108 (90 Base) MCG/ACT inhaler Inhale 2 puffs Every 4 (Four) Hours As Needed for Shortness of Air (chest tightness). 2 inhaler 3   • [DISCONTINUED] budesonide-formoterol (SYMBICORT) 80-4.5 MCG/ACT inhaler Inhale 2 puffs 2 (Two) Times a Day. Rinse mouth with water after each use. 3 inhaler 3     No facility-administered encounter medications on file as of 10/22/2018.        Reviewed use of high risk medication in the elderly: yes  Reviewed for potential of harmful drug interactions in the elderly: yes    Follow Up:  Return in about 6 months (around 4/22/2019) for medicare wellness, Next scheduled follow up.     An After Visit Summary and PPPS with all of these plans were given to the patient.

## 2018-10-23 LAB
ALBUMIN SERPL-MCNC: 4.3 G/DL (ref 3.5–5)
ALBUMIN/GLOB SERPL: 1.4 G/DL (ref 1–2)
ALP SERPL-CCNC: 90 U/L (ref 38–126)
ALT SERPL-CCNC: 28 U/L (ref 13–69)
AST SERPL-CCNC: 33 U/L (ref 15–46)
BILIRUB SERPL-MCNC: 0.7 MG/DL (ref 0.2–1.3)
BUN SERPL-MCNC: 11 MG/DL (ref 7–20)
BUN/CREAT SERPL: 13.8 (ref 7.1–23.5)
CALCIUM SERPL-MCNC: 9.8 MG/DL (ref 8.4–10.2)
CHLORIDE SERPL-SCNC: 104 MMOL/L (ref 98–107)
CO2 SERPL-SCNC: 30 MMOL/L (ref 26–30)
CREAT SERPL-MCNC: 0.8 MG/DL (ref 0.6–1.3)
GLOBULIN SER CALC-MCNC: 3.1 GM/DL
GLUCOSE SERPL-MCNC: 96 MG/DL (ref 74–98)
POTASSIUM SERPL-SCNC: 4.2 MMOL/L (ref 3.5–5.1)
PROT SERPL-MCNC: 7.4 G/DL (ref 6.3–8.2)
SODIUM SERPL-SCNC: 136 MMOL/L (ref 137–145)
T4 SERPL-MCNC: 10.4 UG/DL (ref 4.5–12)
TSH SERPL DL<=0.005 MIU/L-ACNC: 1.52 MIU/ML (ref 0.47–4.68)

## 2019-01-04 ENCOUNTER — OFFICE VISIT (OUTPATIENT)
Dept: NEUROLOGY | Facility: CLINIC | Age: 74
End: 2019-01-04

## 2019-01-04 VITALS
SYSTOLIC BLOOD PRESSURE: 132 MMHG | DIASTOLIC BLOOD PRESSURE: 72 MMHG | OXYGEN SATURATION: 97 % | HEART RATE: 71 BPM | BODY MASS INDEX: 28.51 KG/M2 | HEIGHT: 67 IN

## 2019-01-04 DIAGNOSIS — G47.34 NOCTURNAL OXYGEN DESATURATION: ICD-10-CM

## 2019-01-04 DIAGNOSIS — G47.33 OBSTRUCTIVE SLEEP APNEA: Primary | ICD-10-CM

## 2019-01-04 DIAGNOSIS — G47.19 EXCESSIVE DAYTIME SLEEPINESS: ICD-10-CM

## 2019-01-04 PROCEDURE — 99213 OFFICE O/P EST LOW 20 MIN: CPT | Performed by: PSYCHIATRY & NEUROLOGY

## 2019-01-05 NOTE — PROGRESS NOTES
[facility] NEUROLOGY [place] PROGRESS NOTE  History of Present Illness     Date: 1/4/2019    Patient Identification  Daxa Reyes is a 73 y.o. female.    Patient information was obtained from patient.  History/Exam limitations: none.    Original consultation requested by: Marilyn Vermeesch, MD      Chief Complaint   Sleep Apnea (Pt in office today for follow up appt )      History of Present Illness   Vision is a pleasant 73-year-old referred to [facility] for evaluation of obstructive sleep apnea.  Patient been placed on nasal CPAP and her compliance meter show 96.7 compliance and 90% of the time prior usages greater than 4 hours.  She is currently on nasal CPAP at 8 cm water pressure and her apnea hypotony index reduced to 1.4 events per hour.  Patient reports to be more awake and alert in the daytime.  Her blood pressure also significantly improve her blood pressure today is 132/72.  During Christmas her  have purchased a nasal CPAP automatic cleaning device.    PMH:   Past Medical History:   Diagnosis Date   • Acute maxillary sinusitis    • Anxiety    • Arthritis    • Depression    • Disc degeneration, lumbar    • Dyslipidemia    • Easy bruising    • Edema    • Hypercholesterolemia    • Hyperlipidemia    • Itching    • Melena    • Muscle weakness    • Nervousness    • Nicotine dependence    • Osteoarthritis    • Renal disorder    • Swelling of ankle joint    • Ureteral disorder    • Urinary frequency    • Varicose vein     Varicosity Changes lower legs   • Varicosity     veins- varicosity changes   • Xerosis cutis        Past Surgical History:   Past Surgical History:   Procedure Laterality Date   • COSMETIC SURGERY      neck   • THYROID SURGERY      sub-total thyroidectomy       Family Hisotry:   Family History   Problem Relation Age of Onset   • Hypertension Mother    • Other Mother         myocardial infarction   • Stroke Mother    • Alcohol abuse Father    • Breast  cancer Maternal Aunt        Social History:   Social History     Socioeconomic History   • Marital status:      Spouse name: Not on file   • Number of children: Not on file   • Years of education: Not on file   • Highest education level: Not on file   Social Needs   • Financial resource strain: Not on file   • Food insecurity - worry: Not on file   • Food insecurity - inability: Not on file   • Transportation needs - medical: Not on file   • Transportation needs - non-medical: Not on file   Occupational History   • Not on file   Tobacco Use   • Smoking status: Former Smoker     Packs/day: 0.50     Years: 60.00     Pack years: 30.00     Types: Cigarettes     Last attempt to quit: 3/9/2018     Years since quittin.8   • Smokeless tobacco: Never Used   Substance and Sexual Activity   • Alcohol use: Yes     Comment: unknown   • Drug use: No   • Sexual activity: Defer   Other Topics Concern   • Not on file   Social History Narrative   • Not on file       Medications:   Current Outpatient Medications   Medication Sig Dispense Refill   • albuterol (PROVENTIL HFA;VENTOLIN HFA) 108 (90 Base) MCG/ACT inhaler Inhale 2 puffs Every 4 (Four) Hours As Needed for Shortness of Air (chest tightness). 2 inhaler 3   • busPIRone (BUSPAR) 30 MG tablet Take 1/2-1 tablet BID. 180 tablet 1   • Coenzyme Q10 (CO Q-10) 150 MG capsule Take  by mouth.     • DULoxetine (CYMBALTA) 30 MG capsule Take 1 capsule by mouth Daily. 90 capsule 3   • estradiol (ESTRACE) 1 MG tablet Take 1 tablet by mouth Daily. 1/2 tab daily     • levothyroxine (SYNTHROID, LEVOTHROID) 137 MCG tablet Take 1 tablet by mouth Daily. 90 tablet 1   • Multiple Minerals-Vitamins (CALCIUM & VIT D3 BONE HEALTH PO) Take  by mouth.     • Multiple Vitamin (MULTIVITAMIN) capsule Take 1 capsule by mouth daily.     • Omega-3 Fatty Acids (FISH OIL) 1000 MG capsule capsule Take  by mouth daily with breakfast.     • TURMERIC CURCUMIN PO Take  by mouth.       No current  "facility-administered medications for this visit.        Allergy:   Allergies   Allergen Reactions   • Crestor [Rosuvastatin] Myalgia   • Lipitor [Atorvastatin] Myalgia   • Livalo [Pitavastatin] Dizziness       Review of Systems:  Review of Systems   Constitutional: Positive for fatigue.   HENT: Negative.    Respiratory: Positive for apnea.    Cardiovascular: Negative.  Negative for chest pain and palpitations.   Gastrointestinal: Negative.  Negative for nausea and vomiting.   Neurological: Negative.    Psychiatric/Behavioral: Positive for sleep disturbance.       Physical Exam     Vitals:    01/04/19 1413   BP: 132/72   Pulse: 71   SpO2: 97%   Height: 170.2 cm (67\")     GENERAL: Patient is pleasant, cooperative, appears to be stated age.  Body habitus is endomorphic.  SKIN AND EXTREMITIES:  No skin rashes or lesions are noted.  No cyanosis, clubbing or edema of the extremities.    HEAD:  Head is normocephalic and atraumatic.    NECK: Neck are non-tender without thyromegaly or adenopathy.  Carotic upstrokes are 1+/4.  No cranial or cervical bruits.  The neck is supple with a full range of motion.   ENT: palate elevate symmetrically, no evidence of high arch palate, tongue midline erythema in posterior pharynx, Mallampati Classification Class III   CARDIOVASCULAR:  Regular rate and rhythm with normal S1 and S2 without rub or gallop.  RESPIRATORY:  Clear to auscultation without wheezes or crackle   ABDOMEN:  Soft and non-tender, positive bowel sound without hepatosplenomegaly  BACK:  Back is straight without midline defect.             Records Reviewed: I have personally reviewed her previous medical record.    Daxa was seen today for sleep apnea.    Diagnoses and all orders for this visit:    Obstructive sleep apnea    Excessive daytime sleepiness    Nocturnal oxygen desaturation      Discussion:  In summary , 73-year-old referred to [facility] neurology [place] for evaluation of obstructive sleep apnea.  " Patient been placed on nasal CPAP and her compliance meter show 96.7 compliance and 90% of the time prior usages greater than 4 hours.  She is currently on nasal CPAP at 8 cm water pressure and her apnea hypotony index reduced to 1.4 events per hour.  Patient reports to be more awake and alert in the daytime.  Her blood pressure also significantly improve her blood pressure today is 132/72.  During Christmas her  have purchased a nasal CPAP automatic cleaning device.      I have counseled patient extensively on Sleep Hygiene including regular sleep wake schedule and stimulus control therapy.  I have also discussed the importance of weight reduction because 10% reduction in body weight can reduce sleep apnea by 50 %. We have also discussed abstaining from smoking and drinking.  I have explained to patient that obstructive apnea episode is defined as the absence of airflow for at least 10 seconds.  Sleep apnea is usually accompanied by snoring, disturbed sleep, and daytime sleepiness. Patients with micrognathia, retrognathia, enlarged tonsils, tongue enlargement, and acromegaly are especially predisposed to obstructive sleep apnea. Abnormalities or weakness in the muscles can also contribute to obstructive sleep apnea. Obesity can also contribute to sleep apnea.     Sleep apnea can lead to a number of complications, ranging from daytime sleepiness to possible increased risk of cardiovascular risks.   Daytime sleepiness is the most serious.  Daytime sleepiness can also increase the risk for accident-related injuries. Several studies have suggested that people with sleep apnea have two to three times as many car accidents, and five to seven times the risk for multiple accidents.   A number of cardiovascular diseases -- including high blood pressure, heart failure, stroke, and heart arrhythmias -- have an association with obstructive sleep apnea.   Up to a third of patients with heart failure also have sleep  apnea. Both central and obstructive sleep apnea are linked with heart failure. Obstructive sleep apnea is also noted to be associated with type 2 diabetes according to Dr. Mcneil at [facility].  The best treatment for symptomatic obstructive sleep apnea is continuous positive airflow pressure (CPAP). Bilevel positive airway pressure (BPAP) systems may be particularly helpful for patients with coexisting lung disease and those with excessive levels of carbon dioxide.  Other treatment options including UPPP surgery, LAUP surgery, radiofrequency somnoplasty and dental appliances such [place] or [place].  This Document is signed by Timothy Dutta MD, FAAN, FAASM   January 4, 20199:11 PM

## 2019-01-16 ENCOUNTER — HOSPITAL ENCOUNTER (OUTPATIENT)
Dept: CT IMAGING | Facility: HOSPITAL | Age: 74
Discharge: HOME | End: 2019-01-16
Admitting: PHYSICIAN ASSISTANT

## 2019-01-16 PROCEDURE — G0297 LDCT FOR LUNG CA SCREEN: HCPCS

## 2019-04-26 ENCOUNTER — OFFICE VISIT (OUTPATIENT)
Dept: INTERNAL MEDICINE | Facility: CLINIC | Age: 74
End: 2019-04-26

## 2019-04-26 VITALS
DIASTOLIC BLOOD PRESSURE: 72 MMHG | OXYGEN SATURATION: 99 % | HEIGHT: 67 IN | HEART RATE: 99 BPM | BODY MASS INDEX: 24.96 KG/M2 | WEIGHT: 159 LBS | TEMPERATURE: 98.8 F | SYSTOLIC BLOOD PRESSURE: 138 MMHG

## 2019-04-26 DIAGNOSIS — E03.9 HYPOTHYROIDISM (ACQUIRED): ICD-10-CM

## 2019-04-26 DIAGNOSIS — E55.9 VITAMIN D DEFICIENCY: Primary | ICD-10-CM

## 2019-04-26 DIAGNOSIS — F32.A DEPRESSION, UNSPECIFIED DEPRESSION TYPE: ICD-10-CM

## 2019-04-26 DIAGNOSIS — E78.5 DYSLIPIDEMIA: ICD-10-CM

## 2019-04-26 DIAGNOSIS — R63.4 ABNORMAL WEIGHT LOSS: ICD-10-CM

## 2019-04-26 DIAGNOSIS — F41.9 ANXIETY: ICD-10-CM

## 2019-04-26 PROCEDURE — 99214 OFFICE O/P EST MOD 30 MIN: CPT | Performed by: PHYSICIAN ASSISTANT

## 2019-04-26 RX ORDER — LEVOTHYROXINE SODIUM 137 UG/1
125 TABLET ORAL DAILY
Qty: 90 TABLET | Refills: 1 | Status: SHIPPED | OUTPATIENT
Start: 2019-04-26 | End: 2019-05-17 | Stop reason: SDUPTHER

## 2019-04-26 RX ORDER — BUSPIRONE HYDROCHLORIDE 30 MG/1
30 TABLET ORAL 2 TIMES DAILY
Qty: 180 TABLET | Refills: 1 | Status: SHIPPED | OUTPATIENT
Start: 2019-04-26 | End: 2019-11-11 | Stop reason: SDUPTHER

## 2019-04-26 RX ORDER — DULOXETINE 40 MG/1
40 CAPSULE, DELAYED RELEASE ORAL DAILY
Qty: 90 CAPSULE | Refills: 1 | Status: SHIPPED | OUTPATIENT
Start: 2019-04-26 | End: 2019-11-11 | Stop reason: SDUPTHER

## 2019-04-26 RX ORDER — BUSPIRONE HYDROCHLORIDE 30 MG/1
TABLET ORAL
Qty: 180 TABLET | Refills: 1 | Status: SHIPPED | OUTPATIENT
Start: 2019-04-26 | End: 2019-04-26 | Stop reason: SDUPTHER

## 2019-04-26 NOTE — PROGRESS NOTES
Daxa Reyes is a 73 y.o. female.     Subjective   History of Present Illness   Here today for follow-up of hypothyroidism.  She is currently taking levothyroxine 137 mcg daily as directed.  She denies abnormal fatigue, temperature intolerance, skin changes, bowel habit changes or heart rate changes.  She has lost 23 pounds since her last office visit which she feels was partly related to several days of diarrhea following an antibiotic which has completely resolved.  She is unsure what to contribute the remainder of the weight loss to. No fever, fatigue, adenopathy, night sweats or easy bruising/bleeding.      She admits with that within the last 6 months or so she has felt her depression and anxiety have worsened some.  She is currently taking Cymbalta 30 mg daily as well as BuSpar 15 mg twice daily.  She previously was prescribed BuSpar 30 mg twice daily and is unsure when the prescription changed but reports that she can tell a difference in the 2 doses and would like to resume 30 mg twice daily.          The following portions of the patient's history were reviewed and updated as appropriate: allergies, current medications, past family history, past medical history, past social history, past surgical history and problem list.    Review of Systems   Constitutional: Positive for unexpected weight change. Negative for activity change, appetite change, chills, diaphoresis, fatigue and fever.   Respiratory: Negative for cough, chest tightness, shortness of breath and wheezing.    Cardiovascular: Negative for chest pain, palpitations and leg swelling.   Neurological: Negative for dizziness, tremors, speech difficulty, weakness and light-headedness.   Hematological: Negative for adenopathy. Does not bruise/bleed easily.   Psychiatric/Behavioral: Positive for agitation and dysphoric mood. Negative for behavioral problems, confusion, decreased concentration, hallucinations, self-injury, sleep disturbance and  "suicidal ideas. The patient is nervous/anxious. The patient is not hyperactive.          Objective    Physical Exam   Constitutional: She is oriented to person, place, and time. She appears well-developed and well-nourished. No distress.   HENT:   Head: Normocephalic and atraumatic.   Right Ear: External ear normal.   Left Ear: External ear normal.   Nose: Nose normal.   Mouth/Throat: Oropharynx is clear and moist. No oropharyngeal exudate.   Eyes: Conjunctivae and EOM are normal. Pupils are equal, round, and reactive to light. No scleral icterus.   Neck: Normal range of motion. Neck supple. No thyromegaly present.   Cardiovascular: Normal rate, regular rhythm and normal heart sounds. Exam reveals no gallop and no friction rub.   No murmur heard.  Pulmonary/Chest: Effort normal and breath sounds normal. No respiratory distress. She has no wheezes. She has no rales. She exhibits no tenderness.   Abdominal: Soft. Bowel sounds are normal. She exhibits no distension and no mass. There is no tenderness. There is no rebound and no guarding. No hernia.   Musculoskeletal: Normal range of motion. She exhibits no edema, tenderness or deformity.   Lymphadenopathy:     She has no cervical adenopathy.   Neurological: She is alert and oriented to person, place, and time. She displays normal reflexes. No cranial nerve deficit or sensory deficit.   Skin: Skin is warm and dry. Capillary refill takes less than 2 seconds. No rash noted. She is not diaphoretic. No pallor.   Psychiatric: She has a normal mood and affect. Her behavior is normal. Judgment and thought content normal.   Nursing note and vitals reviewed.        /72   Pulse 99   Temp 98.8 °F (37.1 °C)   Ht 170.2 cm (67.01\")   Wt 72.1 kg (159 lb)   SpO2 99%   BMI 24.90 kg/m²     Nursing note and vitals reviewed.          Assessment/Plan   Daxa was seen today for follow-up.    Diagnoses and all orders for this visit:    Vitamin D deficiency  -     Vitamin D 25 " Hydroxy    Hypothyroidism (acquired)  -     levothyroxine (SYNTHROID, LEVOTHROID) 137 MCG tablet; Take 1 tablet by mouth Daily.  -     TSH  -     T4, Free  Will evaluate labs and discuss any necessary levothyroxine dose change over the phone once reviewed.    Anxiety  -     Dose increased: DULoxetine 40 MG capsule delayed-release particles; Take 1 capsule by mouth Daily.  -     busPIRone (BUSPAR) 30 MG tablet; Take 1 tablet by mouth 2 (Two) Times a Day.    Dyslipidemia  -     Lipid Panel  -     Comprehensive Metabolic Panel    Depression, unspecified depression type  -     Dose increased: DULoxetine 40 MG capsule delayed-release particles; Take 1 capsule by mouth Daily.    Abnormal weight loss       -     CBC with differential        Return in 6 months for Medicare wellness visit or sooner if needed.

## 2019-05-16 LAB
25(OH)D3+25(OH)D2 SERPL-MCNC: 23.5 NG/ML
ALBUMIN SERPL-MCNC: 3.7 G/DL (ref 3.5–5)
ALBUMIN/GLOB SERPL: 1.4 G/DL (ref 1–2)
ALP SERPL-CCNC: 71 U/L (ref 38–126)
ALT SERPL-CCNC: 24 U/L (ref 13–69)
AST SERPL-CCNC: 25 U/L (ref 15–46)
BASOPHILS # BLD AUTO: 0.02 10*3/MM3 (ref 0–0.2)
BASOPHILS NFR BLD AUTO: 0.4 % (ref 0–1.5)
BILIRUB SERPL-MCNC: 0.7 MG/DL (ref 0.2–1.3)
BUN SERPL-MCNC: 11 MG/DL (ref 7–20)
BUN/CREAT SERPL: 15.7 (ref 7.1–23.5)
CALCIUM SERPL-MCNC: 8.6 MG/DL (ref 8.4–10.2)
CHLORIDE SERPL-SCNC: 105 MMOL/L (ref 98–107)
CHOLEST SERPL-MCNC: 209 MG/DL (ref 0–199)
CO2 SERPL-SCNC: 30 MMOL/L (ref 26–30)
CREAT SERPL-MCNC: 0.7 MG/DL (ref 0.6–1.3)
EOSINOPHIL # BLD AUTO: 0.18 10*3/MM3 (ref 0–0.4)
EOSINOPHIL NFR BLD AUTO: 3.6 % (ref 0.3–6.2)
ERYTHROCYTE [DISTWIDTH] IN BLOOD BY AUTOMATED COUNT: 13.4 % (ref 12.3–15.4)
GLOBULIN SER CALC-MCNC: 2.7 GM/DL
GLUCOSE SERPL-MCNC: 84 MG/DL (ref 74–98)
HCT VFR BLD AUTO: 38.6 % (ref 34–46.6)
HDLC SERPL-MCNC: 60 MG/DL (ref 40–60)
HGB BLD-MCNC: 12.4 G/DL (ref 12–15.9)
IMM GRANULOCYTES # BLD AUTO: 0.01 10*3/MM3 (ref 0–0.05)
IMM GRANULOCYTES NFR BLD AUTO: 0.2 % (ref 0–0.5)
LDLC SERPL CALC-MCNC: 128 MG/DL (ref 0–99)
LYMPHOCYTES # BLD AUTO: 1.75 10*3/MM3 (ref 0.7–3.1)
LYMPHOCYTES NFR BLD AUTO: 35.4 % (ref 19.6–45.3)
MCH RBC QN AUTO: 29.7 PG (ref 26.6–33)
MCHC RBC AUTO-ENTMCNC: 32.1 G/DL (ref 31.5–35.7)
MCV RBC AUTO: 92.3 FL (ref 79–97)
MONOCYTES # BLD AUTO: 0.37 10*3/MM3 (ref 0.1–0.9)
MONOCYTES NFR BLD AUTO: 7.5 % (ref 5–12)
NEUTROPHILS # BLD AUTO: 2.61 10*3/MM3 (ref 1.7–7)
NEUTROPHILS NFR BLD AUTO: 52.9 % (ref 42.7–76)
NRBC BLD AUTO-RTO: 0 /100 WBC (ref 0–0.2)
PLATELET # BLD AUTO: 243 10*3/MM3 (ref 140–450)
POTASSIUM SERPL-SCNC: 3.8 MMOL/L (ref 3.5–5.1)
PROT SERPL-MCNC: 6.4 G/DL (ref 6.3–8.2)
RBC # BLD AUTO: 4.18 10*6/MM3 (ref 3.77–5.28)
SODIUM SERPL-SCNC: 141 MMOL/L (ref 137–145)
T4 FREE SERPL-MCNC: 1.15 NG/DL (ref 0.78–2.19)
TRIGL SERPL-MCNC: 107 MG/DL
TSH SERPL DL<=0.005 MIU/L-ACNC: <0.015 MIU/ML (ref 0.47–4.68)
VLDLC SERPL CALC-MCNC: 21.4 MG/DL
WBC # BLD AUTO: 4.94 10*3/MM3 (ref 3.4–10.8)

## 2019-05-17 DIAGNOSIS — E03.8 OTHER SPECIFIED HYPOTHYROIDISM: Primary | ICD-10-CM

## 2019-05-17 DIAGNOSIS — E03.9 HYPOTHYROIDISM (ACQUIRED): ICD-10-CM

## 2019-05-17 RX ORDER — LEVOTHYROXINE SODIUM 0.12 MG/1
125 TABLET ORAL DAILY
Qty: 90 TABLET | Refills: 1 | Status: SHIPPED | OUTPATIENT
Start: 2019-05-17 | End: 2019-11-12 | Stop reason: SDUPTHER

## 2019-07-11 ENCOUNTER — TELEPHONE (OUTPATIENT)
Dept: INTERNAL MEDICINE | Facility: CLINIC | Age: 74
End: 2019-07-11

## 2019-07-11 NOTE — TELEPHONE ENCOUNTER
[facility] left a message regarding the patient. States that she is due for a colonoscopy. She wanted to know who Dr. SONG refers to. You can call the patient back with this info.

## 2019-10-14 ENCOUNTER — FLU SHOT (OUTPATIENT)
Dept: INTERNAL MEDICINE | Facility: CLINIC | Age: 74
End: 2019-10-14

## 2019-10-14 DIAGNOSIS — Z23 IMMUNIZATION, PNEUMOCOCCUS AND INFLUENZA: ICD-10-CM

## 2019-10-14 PROCEDURE — 90653 IIV ADJUVANT VACCINE IM: CPT | Performed by: PHYSICIAN ASSISTANT

## 2019-10-14 PROCEDURE — G0008 ADMIN INFLUENZA VIRUS VAC: HCPCS | Performed by: PHYSICIAN ASSISTANT

## 2019-11-11 ENCOUNTER — OFFICE VISIT (OUTPATIENT)
Dept: INTERNAL MEDICINE | Facility: CLINIC | Age: 74
End: 2019-11-11

## 2019-11-11 VITALS
BODY MASS INDEX: 25.11 KG/M2 | OXYGEN SATURATION: 99 % | HEIGHT: 67 IN | SYSTOLIC BLOOD PRESSURE: 140 MMHG | HEART RATE: 92 BPM | WEIGHT: 160 LBS | DIASTOLIC BLOOD PRESSURE: 76 MMHG | TEMPERATURE: 98.8 F

## 2019-11-11 DIAGNOSIS — E55.9 VITAMIN D DEFICIENCY: ICD-10-CM

## 2019-11-11 DIAGNOSIS — E03.9 HYPOTHYROIDISM (ACQUIRED): ICD-10-CM

## 2019-11-11 DIAGNOSIS — F32.A DEPRESSION, UNSPECIFIED DEPRESSION TYPE: ICD-10-CM

## 2019-11-11 DIAGNOSIS — J44.9 COPD, MILD (HCC): ICD-10-CM

## 2019-11-11 DIAGNOSIS — F41.9 ANXIETY: ICD-10-CM

## 2019-11-11 DIAGNOSIS — F17.210 CIGARETTE SMOKER: ICD-10-CM

## 2019-11-11 DIAGNOSIS — Z00.00 MEDICARE ANNUAL WELLNESS VISIT, SUBSEQUENT: Primary | ICD-10-CM

## 2019-11-11 PROCEDURE — 99397 PER PM REEVAL EST PAT 65+ YR: CPT | Performed by: PHYSICIAN ASSISTANT

## 2019-11-11 PROCEDURE — G0439 PPPS, SUBSEQ VISIT: HCPCS | Performed by: PHYSICIAN ASSISTANT

## 2019-11-11 PROCEDURE — 99406 BEHAV CHNG SMOKING 3-10 MIN: CPT | Performed by: PHYSICIAN ASSISTANT

## 2019-11-11 RX ORDER — DULOXETINE 40 MG/1
40 CAPSULE, DELAYED RELEASE ORAL DAILY
Qty: 90 CAPSULE | Refills: 1 | Status: SHIPPED | OUTPATIENT
Start: 2019-11-11 | End: 2020-05-11

## 2019-11-11 RX ORDER — BUSPIRONE HYDROCHLORIDE 30 MG/1
30 TABLET ORAL 2 TIMES DAILY
Qty: 180 TABLET | Refills: 1 | Status: SHIPPED | OUTPATIENT
Start: 2019-11-11 | End: 2020-05-21 | Stop reason: SDUPTHER

## 2019-11-11 RX ORDER — LEVOTHYROXINE SODIUM 0.12 MG/1
125 TABLET ORAL DAILY
Qty: 90 TABLET | Refills: 1 | Status: CANCELLED | OUTPATIENT
Start: 2019-11-11

## 2019-11-11 NOTE — PATIENT INSTRUCTIONS
Medicare Wellness  Personal Prevention Plan of Service     Date of Office Visit:  2019  Encounter Provider:  SILVIA Perez  Place of Service:  [facility]  Patient Name: Daxa Reyes  :  1945    As part of the Medicare Wellness portion of your visit today, we are providing you with this personalized preventive plan of services (PPPS). This plan is based upon recommendations of the [place] Preventive Services Task Force (USPSTF) and the Advisory Committee on Immunization Practices (ACIP).    This lists the preventive care services that should be considered, and provides dates of when you are due. Items listed as completed are up-to-date and do not require any further intervention.    Health Maintenance   Topic Date Due   • TDAP/TD VACCINES (1 - Tdap) 1963   • ZOSTER VACCINE (1 of 2) 1994   • PNEUMOCOCCAL VACCINES (65+ LOW/MEDIUM RISK) (2 of 2 - PPSV23) 2019   • MEDICARE ANNUAL WELLNESS  10/22/2019   • LUNG CANCER SCREENING  2020   • LIPID PANEL  2020   • MAMMOGRAM  2020   • COLONOSCOPY  2022   • INFLUENZA VACCINE  Completed       Orders Placed This Encounter   Procedures   • CT Chest Low Dose Wo     Standing Status:   Future     Standing Expiration Date:   2020     Order Specific Question:   The patient is age 55-77:     Answer:   73     Order Specific Question:   The patient is a current smoker?     Answer:   Yes     Order Specific Question:   The patient has a smoking history of 30 pack-years or greater:     Answer:   Yes     Order Specific Question:   Actual pack - year smoking history (number):     Answer:   60     Order Specific Question:   Has the Patient had a Chest CT scan within the past 12 months?     Answer:   No     Order Specific Question:   Does the patient have any clinical signs/symptoms of lung cancer?     Answer:   No     Order Specific Question:   The patient was engaged in shared  decision-making for this test:     Answer:   Yes   • TSH   • T4, Free   • Vitamin D 25 Hydroxy       Return in about 1 year (around 11/11/2020) for medicare wellness.

## 2019-11-11 NOTE — PROGRESS NOTES
The ABCs of the Annual Wellness Visit  Subsequent Medicare Wellness Visit    Chief Complaint   Patient presents with   • Medicare Wellness-subsequent       Subjective   History of Present Illness:    Levothyroxine dose decreased 6 months ago.  She denies abnormal fatigue, weight change, temperature intolerance, skin changes, bowel habit changes or heart rate changes.     Blood pressure is a little elevated today.  She admits she has resumed smoking. She will try to quit again using lozenges and gum she has at home. Patient denies chest pain, dyspnea, orthopnea, palpitations, lower extremity edema, headaches, weakness, visual disturbances or confusion.     COPD not bothersome. No dyspnea, cough, chest tightness or wheezing. She has an albuterol inhaler which she never uses.         Daxa Reyes is a 73 y.o. female who presents for a Subsequent Medicare Wellness Visit.    HEALTH RISK ASSESSMENT    Recent Hospitalizations:  No hospitalization(s) within the last year.    Current Medical Providers:  Patient Care Team:  Molly Manning PA as PCP - General (Physician Assistant)    Smoking Status:  Social History     Tobacco Use   Smoking Status Current Every Day Smoker   • Packs/day: 0.50   • Years: 60.00   • Pack years: 30.00   • Types: Cigarettes   • Last attempt to quit: 3/9/2018   • Years since quittin.6   Smokeless Tobacco Never Used       Alcohol Consumption:  Social History     Substance and Sexual Activity   Alcohol Use Yes    Comment: unknown       Depression Screen:   PHQ-2/PHQ-9 Depression Screening 2019   Little interest or pleasure in doing things 0   Feeling down, depressed, or hopeless 0   Total Score 0       Fall Risk Screen:  STEADI Fall Risk Assessment has not been completed.    Health Habits and Functional and Cognitive Screening:  Functional & Cognitive Status 2019   Do you have difficulty preparing food and eating? No   Do you have difficulty bathing yourself, getting  dressed or grooming yourself? No   Do you have difficulty using the toilet? No   Do you have difficulty moving around from place to place? No   Do you have trouble with steps or getting out of a bed or a chair? No   Current Diet Well Balanced Diet   Dental Exam Up to date   Eye Exam Up to date   Exercise (times per week) 6 times per week   Current Exercise Activities Include Swimming   Do you need help using the phone?  No   Are you deaf or do you have serious difficulty hearing?  Yes   Do you need help with transportation? No   Do you need help shopping? No   Do you need help preparing meals?  No   Do you need help with housework?  No   Do you need help with laundry? No   Do you need help taking your medications? No   Do you need help managing money? No   Do you ever drive or ride in a car without wearing a seat belt? Yes   Have you felt unusual stress, anger or loneliness in the last month? No   Who do you live with? Spouse   If you need help, do you have trouble finding someone available to you? No   Have you been bothered in the last four weeks by sexual problems? No   Do you have difficulty concentrating, remembering or making decisions? No         Does the patient have evidence of cognitive impairment? No    Asprin use counseling:Does not need ASA (and currently is not on it)    Age-appropriate Screening Schedule:  Refer to the list below for future screening recommendations based on patient's age, sex and/or medical conditions. Orders for these recommended tests are listed in the plan section. The patient has been provided with a written plan.    Health Maintenance   Topic Date Due   • TDAP/TD VACCINES (1 - Tdap) 12/14/1963   • ZOSTER VACCINE (1 of 2) 12/14/1994   • PNEUMOCOCCAL VACCINES (65+ LOW/MEDIUM RISK) (2 of 2 - PPSV23) 04/09/2019   • LIPID PANEL  05/16/2020   • MAMMOGRAM  06/12/2020   • COLONOSCOPY  11/26/2022   • INFLUENZA VACCINE  Completed          The following portions of the patient's history  were reviewed and updated as appropriate: allergies, current medications, past family history, past medical history, past social history, past surgical history and problem list.    Outpatient Medications Prior to Visit   Medication Sig Dispense Refill   • albuterol (PROVENTIL HFA;VENTOLIN HFA) 108 (90 Base) MCG/ACT inhaler Inhale 2 puffs Every 4 (Four) Hours As Needed for Shortness of Air (chest tightness). 2 inhaler 3   • Coenzyme Q10 (CO Q-10) 150 MG capsule Take  by mouth.     • estradiol (ESTRACE) 1 MG tablet Take 1 tablet by mouth Daily. 1/2 tab daily     • levothyroxine (SYNTHROID, LEVOTHROID) 125 MCG tablet Take 1 tablet by mouth Daily. 90 tablet 1   • Multiple Minerals-Vitamins (CALCIUM & VIT D3 BONE HEALTH PO) Take  by mouth.     • Multiple Vitamin (MULTIVITAMIN) capsule Take 1 capsule by mouth daily.     • Omega-3 Fatty Acids (FISH OIL) 1000 MG capsule capsule Take  by mouth daily with breakfast.     • TURMERIC CURCUMIN PO Take  by mouth.     • busPIRone (BUSPAR) 30 MG tablet Take 1 tablet by mouth 2 (Two) Times a Day. 180 tablet 1   • DULoxetine 40 MG capsule delayed-release particles Take 1 capsule by mouth Daily. 90 capsule 1     No facility-administered medications prior to visit.        Patient Active Problem List   Diagnosis   • COPD, mild (CMS/HCC)   • Hypothyroidism   • Vitamin D deficiency   • Dyslipidemia   • ANA LAURA (obstructive sleep apnea)   • Vertigo   • History of arthritis   • History of osteoarthritis   • Renal disorder   • Xerosis cutis   • Anxiety   • Depressed   • Numbness in feet   • Varicose veins of both lower extremities   • Environmental allergies   • Encounter for screening for lung cancer       Advanced Care Planning:  Patient does not have an advance directive - information provided to the patient today    Review of Systems   Constitutional: Negative for appetite change, chills, fatigue, fever and unexpected weight change.   HENT: Negative for congestion, ear pain, hearing loss,  "nosebleeds, postnasal drip, rhinorrhea, sore throat, tinnitus and trouble swallowing.    Eyes: Negative for photophobia, discharge and visual disturbance.   Respiratory: Negative for cough, chest tightness, shortness of breath and wheezing.    Cardiovascular: Negative for chest pain, palpitations and leg swelling.   Gastrointestinal: Negative for abdominal distention, abdominal pain, blood in stool, constipation, diarrhea, nausea and vomiting.   Endocrine: Negative for cold intolerance, heat intolerance, polydipsia, polyphagia and polyuria.   Genitourinary: Negative.    Musculoskeletal: Negative for arthralgias, back pain, joint swelling, myalgias, neck pain and neck stiffness.   Skin: Negative for color change, pallor, rash and wound.   Allergic/Immunologic: Negative for environmental allergies, food allergies and immunocompromised state.   Neurological: Negative for dizziness, tremors, seizures, weakness, numbness and headaches.   Hematological: Negative for adenopathy. Does not bruise/bleed easily.   Psychiatric/Behavioral: Negative for agitation, behavioral problems, confusion, hallucinations, self-injury and suicidal ideas. The patient is not nervous/anxious.        Compared to one year ago, the patient feels her physical health is better.  Compared to one year ago, the patient feels her mental health is the same.    Reviewed chart for potential of high risk medication in the elderly: yes  Reviewed chart for potential of harmful drug interactions in the elderly:yes    Objective         Vitals:    11/11/19 1317   BP: 140/76   Pulse: 92   Temp: 98.8 °F (37.1 °C)   SpO2: 99%   Weight: 72.6 kg (160 lb)   Height: 170.2 cm (67.01\")   PainSc: 0-No pain       Body mass index is 25.05 kg/m².  Discussed the patient's BMI with her. The BMI is in the acceptable range.    Physical Exam   Constitutional: She is oriented to person, place, and time. She appears well-developed and well-nourished. No distress.   HENT:   Head: " Normocephalic and atraumatic.   Right Ear: External ear normal.   Left Ear: External ear normal.   Nose: Nose normal.   Mouth/Throat: Oropharynx is clear and moist. No oropharyngeal exudate.   Eyes: Conjunctivae and EOM are normal. Pupils are equal, round, and reactive to light. No scleral icterus.   Neck: Normal range of motion. Neck supple. No thyromegaly present.   Cardiovascular: Normal rate, regular rhythm and normal heart sounds. Exam reveals no gallop and no friction rub.   No murmur heard.  Pulmonary/Chest: Effort normal and breath sounds normal. No respiratory distress. She has no wheezes. She has no rales. She exhibits no tenderness.   Abdominal: Soft. Bowel sounds are normal. She exhibits no distension and no mass. There is no tenderness. There is no rebound.   Musculoskeletal: Normal range of motion. She exhibits no edema, tenderness or deformity.   Lymphadenopathy:     She has no cervical adenopathy.   Neurological: She is alert and oriented to person, place, and time. She displays normal reflexes. No cranial nerve deficit or sensory deficit.   Skin: Skin is warm and dry. Capillary refill takes less than 2 seconds. No rash noted. She is not diaphoretic. No pallor.   Psychiatric: She has a normal mood and affect. Her behavior is normal. Judgment and thought content normal.   Nursing note and vitals reviewed.            Assessment/Plan   Medicare Risks and Personalized Health Plan  CMS Preventative Services Quick Reference  Advance Directive Discussion  Lung Cancer Risk  Tobacco Use/Dependance.       The above risks/problems have been discussed with the patient.  Pertinent information has been shared with the patient in the After Visit Summary.  Follow up plans and orders are seen below in the Assessment/Plan Section.    Diagnoses and all orders for this visit:    1. Medicare annual wellness visit, subsequent (Primary)    2. BMI 25.0-25.9,adult  Patient's Body mass index is 25.05 kg/m². BMI is within  normal parameters. No follow-up required..    3. Anxiety  -     busPIRone (BUSPAR) 30 MG tablet; Take 1 tablet by mouth 2 (Two) Times a Day.  Dispense: 180 tablet; Refill: 1  -     Stable, continue: DULoxetine HCl 40 MG capsule delayed-release particles; Take 1 capsule by mouth Daily.  Dispense: 90 capsule; Refill: 1    4. Depression, unspecified depression type  -     Stable, continue: DULoxetine HCl 40 MG capsule delayed-release particles; Take 1 capsule by mouth Daily.  Dispense: 90 capsule; Refill: 1    5. Hypothyroidism (acquired)  -     TSH  -     T4, Free  Recheck. [facility] once appropriate dose is determined from reviewing labs.     6. Vitamin D deficiency  -     Vitamin D 25 Hydroxy    7. COPD, mild (CMS/HCC)  Asymptomatic.     8. Cigarette smoker  -     CT Chest Low Dose Wo; Future  Risks and benefits of lung cancer screening discussed. Shared decision making employed in making decision to proceed with lung cancer screening. Patient is aware further testing may be needed and that CT scans occur yearly. Another risk is radiation exposure, cumulative over time, though low levels.  At this time the patient has no signs of lung cancer.  A copy of the order for low-dose CT screening was provided to the patient.     Spent 5 minutes discussing smoking cessation. She elected to use lozenges and gum which she has at home.  I advised the patient of the risks in continuing to use tobacco.  I provided the patient with smoking cessation educational materials as well as discussed methods to quit smoking and patient has epxressed the willingness to quit.       Follow Up:  Return in about 1 year (around 11/11/2020) for medicare wellness.     An After Visit Summary and PPPS were given to the patient.

## 2019-11-12 DIAGNOSIS — E03.9 HYPOTHYROIDISM (ACQUIRED): ICD-10-CM

## 2019-11-12 LAB
25(OH)D3+25(OH)D2 SERPL-MCNC: 29.2 NG/ML (ref 30–100)
T4 FREE SERPL-MCNC: 1.26 NG/DL (ref 0.93–1.7)
TSH SERPL DL<=0.005 MIU/L-ACNC: 1.26 UIU/ML (ref 0.27–4.2)

## 2019-11-12 RX ORDER — LEVOTHYROXINE SODIUM 0.12 MG/1
125 TABLET ORAL DAILY
Qty: 90 TABLET | Refills: 1 | Status: SHIPPED | OUTPATIENT
Start: 2019-11-12 | End: 2020-05-28 | Stop reason: SDUPTHER

## 2020-01-06 ENCOUNTER — OFFICE VISIT (OUTPATIENT)
Dept: NEUROLOGY | Facility: CLINIC | Age: 75
End: 2020-01-06

## 2020-01-06 VITALS
OXYGEN SATURATION: 97 % | WEIGHT: 160 LBS | HEART RATE: 84 BPM | BODY MASS INDEX: 25.05 KG/M2 | SYSTOLIC BLOOD PRESSURE: 142 MMHG | DIASTOLIC BLOOD PRESSURE: 80 MMHG | TEMPERATURE: 98 F

## 2020-01-06 DIAGNOSIS — G47.33 OSA (OBSTRUCTIVE SLEEP APNEA): Primary | ICD-10-CM

## 2020-01-06 PROCEDURE — 99213 OFFICE O/P EST LOW 20 MIN: CPT | Performed by: NURSE PRACTITIONER

## 2020-01-06 NOTE — PROGRESS NOTES
Subjective   Daxa Reyes is a 74 y.o. female     Chief Complaint   Patient presents with   • Follow-up   • Sleep Apnea       History of Present Illness     Patient is a very pleasant 74-year-old female in clinic today to follow-up for sleep apnea with CPAP.  Patient sleep compliance is reviewed in clinic with patient her AHI is corrected to 1.3 and 100% usage greater than 4 hours set at 8 cm CPAP.  Patient states she has more energy she is sleeping well and patient continues to work out at the [facility] with boot camps and group classes.    Past Hx: Vision is a pleasant 73-year-old referred to [facility] neurology [place] for evaluation of obstructive sleep apnea.  Patient been placed on nasal CPAP and her compliance meter show 96.7 compliance and 90% of the time prior usages greater than 4 hours.  She is currently on nasal CPAP at 8 cm water pressure and her apnea hypotony index reduced to 1.4 events per hour.  Patient reports to be more awake and alert in the daytime.  Her blood pressure also significantly improve her blood pressure today is 132/72.  During Christmas her  have purchased a nasal CPAP automatic cleaning device.      The following portions of the patient's history were reviewed and updated as appropriate: allergies, current medications, past family history, past medical history, past social history, past surgical history and problem list.    Review of Systems   Constitutional: Negative for chills and fever.   HENT: Negative for congestion, ear pain, hearing loss, rhinorrhea and sore throat.    Eyes: Negative for pain, discharge and redness.   Respiratory: Negative for cough, shortness of breath, wheezing and stridor.    Cardiovascular: Negative for chest pain, palpitations and leg swelling.   Gastrointestinal: Negative for abdominal pain, constipation, nausea and vomiting.   Endocrine: Negative for cold intolerance, heat intolerance and polyphagia.   Genitourinary: Negative for  dysuria, flank pain, frequency and urgency.   Musculoskeletal: Negative for joint swelling, myalgias, neck pain and neck stiffness.   Skin: Negative for pallor, rash and wound.   Allergic/Immunologic: Negative for environmental allergies.   Neurological: Negative for dizziness, tremors, seizures, syncope, facial asymmetry, speech difficulty, weakness, light-headedness, numbness and headaches.   Hematological: Negative for adenopathy.   Psychiatric/Behavioral: Positive for sleep disturbance. Negative for confusion and hallucinations. The patient is nervous/anxious.        Objective       Vitals:    01/06/20 1308   BP: 142/80   Pulse: 84   Temp: 98 °F (36.7 °C)   SpO2: 97%   Weight: 72.6 kg (160 lb)     GENERAL: Patient is pleasant, cooperative, appears to be stated age.    HEAD:  Head is normocephalic and atraumatic.    NECK: Neck are non-tender without thyromegaly or adenopathy.  Carotic upstrokes are 1+/4.  No cranial or cervical bruits.  The neck is supple with a full range of motion.   CARDIOVASCULAR:  Regular rate and rhythm with normal S1 and S2 without rub or gallop.  RESPIRATORY:  Clear to auscultation without wheezes or crackle   PSYCH:  Higher cortical function/mental status:  The patient is alert.  She  is oriented x3 to time, place and person.  Recent and the remote memory appear normal.  The patient has a good fund of knowledge.  There is no visual or auditory hallucination or suicidal or homicidal ideation.  SPEECH:There is no gross evidence of aphasia, dysarthria or agnosia.      COORDINATION AND GAIT:  The patient walks with a narrow-based gait.      Results for orders placed or performed in visit on 11/11/19   TSH   Result Value Ref Range    TSH 1.260 0.270 - 4.200 uIU/mL   T4, Free   Result Value Ref Range    Free T4 1.26 0.93 - 1.70 ng/dL   Vitamin D 25 Hydroxy   Result Value Ref Range    25 Hydroxy, Vitamin D 29.2 (L) 30.0 - 100.0 ng/ml       I have personally reviewed the above labs. Pt follows  with PCP.    Assessment/Plan     1.  Sleep apnea: Continue CPAP 8 cm nightly.    I have counseled patient extensively on Sleep Hygiene including regular sleep wake schedule and stimulus control therapy.  I have also discussed the importance of weight reduction because 10% reduction in body weight can reduce sleep apnea by 50 %. We have also discussed abstaining from smoking and drinking.  I have explained to patient that obstructive apnea episode is defined as the absence of airflow for at least 10 seconds.  Sleep apnea is usually accompanied by snoring, disturbed sleep, and daytime sleepiness. Patients with micrognathia, retrognathia, enlarged tonsils, tongue enlargement, and acromegaly are especially predisposed to obstructive sleep apnea. Abnormalities or weakness in the muscles can also contribute to obstructive sleep apnea. Obesity can also contribute to sleep apnea.     Sleep apnea can lead to a number of complications, ranging from daytime sleepiness to possible increased risk of cardiovascular risks.   Daytime sleepiness is the most serious.  Daytime sleepiness can also increase the risk for accident-related injuries. Several studies have suggested that people with sleep apnea have two to three times as many car accidents, and five to seven times the risk for multiple accidents.   A number of cardiovascular diseases -- including high blood pressure, heart failure, stroke, and heart arrhythmias -- have an association with obstructive sleep apnea.   Up to a third of patients with heart failure also have sleep apnea. Both central and obstructive sleep apnea are linked with heart failure. Obstructive sleep apnea is also noted to be associated with type 2 diabetes according to Dr. Mcneil at [facility].  The best treatment for symptomatic obstructive sleep apnea is continuous positive airflow pressure (CPAP). Bilevel positive airway pressure (BPAP) systems may be particularly helpful for patients with  coexisting lung disease and those with excessive levels of carbon dioxide.  Other treatment options including UPPP surgery, LAUP surgery, radiofrequency somnoplasty and dental appliances such [place] or [place].

## 2020-01-22 ENCOUNTER — HOSPITAL ENCOUNTER (OUTPATIENT)
Dept: CT IMAGING | Facility: HOSPITAL | Age: 75
Discharge: HOME | End: 2020-01-22
Admitting: PHYSICIAN ASSISTANT

## 2020-01-22 DIAGNOSIS — F17.210 CIGARETTE SMOKER: ICD-10-CM

## 2020-01-22 PROCEDURE — G0297 LDCT FOR LUNG CA SCREEN: HCPCS

## 2020-05-09 DIAGNOSIS — F41.9 ANXIETY: ICD-10-CM

## 2020-05-09 DIAGNOSIS — F32.A DEPRESSION, UNSPECIFIED DEPRESSION TYPE: ICD-10-CM

## 2020-05-11 RX ORDER — DULOXETINE 40 MG/1
CAPSULE, DELAYED RELEASE ORAL
Qty: 90 CAPSULE | Refills: 3 | Status: SHIPPED | OUTPATIENT
Start: 2020-05-11 | End: 2021-05-04

## 2020-05-21 ENCOUNTER — OFFICE VISIT (OUTPATIENT)
Dept: INTERNAL MEDICINE | Facility: CLINIC | Age: 75
End: 2020-05-21

## 2020-05-21 VITALS
WEIGHT: 154 LBS | HEART RATE: 98 BPM | BODY MASS INDEX: 23.34 KG/M2 | TEMPERATURE: 98.4 F | HEIGHT: 68 IN | OXYGEN SATURATION: 98 % | SYSTOLIC BLOOD PRESSURE: 126 MMHG | DIASTOLIC BLOOD PRESSURE: 72 MMHG

## 2020-05-21 DIAGNOSIS — E78.5 DYSLIPIDEMIA: ICD-10-CM

## 2020-05-21 DIAGNOSIS — J43.2 CENTRILOBULAR EMPHYSEMA (HCC): ICD-10-CM

## 2020-05-21 DIAGNOSIS — F41.9 ANXIETY: ICD-10-CM

## 2020-05-21 DIAGNOSIS — E03.8 OTHER SPECIFIED HYPOTHYROIDISM: Primary | ICD-10-CM

## 2020-05-21 PROCEDURE — 99214 OFFICE O/P EST MOD 30 MIN: CPT | Performed by: PHYSICIAN ASSISTANT

## 2020-05-21 RX ORDER — MEDROXYPROGESTERONE ACETATE 5 MG/1
TABLET ORAL
COMMUNITY
Start: 2020-04-14 | End: 2021-07-21 | Stop reason: SDUPTHER

## 2020-05-21 RX ORDER — BUSPIRONE HYDROCHLORIDE 30 MG/1
30 TABLET ORAL 2 TIMES DAILY
Qty: 180 TABLET | Refills: 1 | Status: SHIPPED | OUTPATIENT
Start: 2020-05-21 | End: 2021-08-02

## 2020-05-21 NOTE — PROGRESS NOTES
Daxa Reyes is a 74 y.o. female.     Subjective   History of Present Illness   Here today for 6-month follow-up of hypothyroidism and anxiety.  She has continued to take duloxetine 40 mg daily however she is unsure how well it is working as she recently has had a little more trouble with anxiety.  She does not feel that now is a good time to consider a dosage or medication change due to the likelihood that COVID-19 has caused increased anxiety.  No SI, HI or sleep disturbances.     She is taking levothyroxine 125 mcg nightly with her other medications.  She has previously been advised to take levothyroxine each morning on an empty stomach however she was unable to remember to take it and found that the only way she would take it daily as if she did it at night with her other medications. She denies abnormal fatigue, weight change, temperature intolerance, skin changes, bowel habit changes or heart rate changes.     Blood pressure was elevated during last appointment here as well as at her last neurology appointment, however it is back within normal range today.  She denies chest pain, dyspnea, orthopnea, palpitations, lower extremity edema, headaches, weakness, visual disturbances or confusion.       Emphysema noted on CT. She denies any cough, wheezing, chest tightness or SOA. She continues to smoke and does not want to quit.         The following portions of the patient's history were reviewed and updated as appropriate: allergies, current medications, past family history, past medical history, past social history, past surgical history and problem list.    Review of Systems   Constitutional: Negative for appetite change, chills, fatigue, fever and unexpected weight change.   Eyes: Negative for visual disturbance.   Respiratory: Negative for cough, chest tightness, shortness of breath and wheezing.    Cardiovascular: Negative for chest pain, palpitations and leg swelling.   Gastrointestinal: Negative  for abdominal distention, anal bleeding, constipation, diarrhea, nausea and rectal pain.   Endocrine: Negative for cold intolerance, heat intolerance, polydipsia, polyphagia and polyuria.   Genitourinary: Negative.    Musculoskeletal: Negative for arthralgias, gait problem, joint swelling and neck pain.   Skin: Negative for color change.   Allergic/Immunologic: Negative for immunocompromised state.   Neurological: Negative for dizziness, syncope, speech difficulty, weakness, numbness and headaches.   Hematological: Negative for adenopathy. Does not bruise/bleed easily.   Psychiatric/Behavioral: Negative for agitation, behavioral problems, confusion, dysphoric mood, self-injury, sleep disturbance and suicidal ideas. The patient is nervous/anxious. The patient is not hyperactive.          Objective    Physical Exam   Constitutional: She is oriented to person, place, and time. She appears well-developed and well-nourished. No distress.   HENT:   Head: Normocephalic and atraumatic.   Eyes: Pupils are equal, round, and reactive to light. Conjunctivae and EOM are normal.   Neck: Normal range of motion. Neck supple.   Cardiovascular: Normal rate, regular rhythm and normal heart sounds. Exam reveals no gallop and no friction rub.   No murmur heard.  Pulmonary/Chest: Effort normal and breath sounds normal. No respiratory distress. She has no wheezes. She has no rales.   Abdominal: Soft. Bowel sounds are normal. There is no tenderness.   Musculoskeletal: Normal range of motion. She exhibits no edema, tenderness or deformity.   Neurological: She is alert and oriented to person, place, and time. She displays normal reflexes. No cranial nerve deficit or sensory deficit. She exhibits normal muscle tone. Coordination normal.   Skin: Skin is warm and dry. Capillary refill takes less than 2 seconds. No rash noted. She is not diaphoretic. No pallor.   Psychiatric: She has a normal mood and affect. Her behavior is normal. Judgment and  "thought content normal.   Nursing note and vitals reviewed.        /72   Pulse 98   Temp 98.4 °F (36.9 °C) (Temporal)   Ht 171.5 cm (67.5\")   Wt 69.9 kg (154 lb)   SpO2 98%   BMI 23.76 kg/m²     Nursing note and vitals reviewed.        Assessment/Plan   Daxa was seen today for follow-up.    Diagnoses and all orders for this visit:    Other specified hypothyroidism  -     TSH  -     T4, Free  -     Lipid Panel  Clinically euthyroid with exception of little anxiety. She is aware that she should take levothyroxine each morning on an empty stomach, however she misses doses if she does not take it with all other medications at night.     Dyslipidemia  -     Lipid Panel  -     Comprehensive Metabolic Panel    Anxiety  -     TSH  -     T4, Free  -     Continue: busPIRone (BUSPAR) 30 MG tablet; Take 1 tablet by mouth 2 (Two) Times a Day.  Continue Cymbalta 40 mg daily.     Centrilobular emphysema (CMS/HCC)  Patient declined smoking cessation assistance. Will repeat CT lung cancer screening in 1 year.       Return in around 6 months for medicare wellness visit.          SILVIA Perez  05/21/2020  10:02 AM  "

## 2020-05-27 LAB
ALBUMIN SERPL-MCNC: 4.2 G/DL (ref 3.5–5.2)
ALBUMIN/GLOB SERPL: 1.6 G/DL
ALP SERPL-CCNC: 69 U/L (ref 39–117)
ALT SERPL-CCNC: 9 U/L (ref 1–33)
AST SERPL-CCNC: 16 U/L (ref 1–32)
BILIRUB SERPL-MCNC: 0.8 MG/DL (ref 0.2–1.2)
BUN SERPL-MCNC: 10 MG/DL (ref 8–23)
BUN/CREAT SERPL: 12 (ref 7–25)
CALCIUM SERPL-MCNC: 8.9 MG/DL (ref 8.6–10.5)
CHLORIDE SERPL-SCNC: 106 MMOL/L (ref 98–107)
CHOLEST SERPL-MCNC: 207 MG/DL (ref 0–200)
CO2 SERPL-SCNC: 30.1 MMOL/L (ref 22–29)
CREAT SERPL-MCNC: 0.83 MG/DL (ref 0.57–1)
GLOBULIN SER CALC-MCNC: 2.7 GM/DL
GLUCOSE SERPL-MCNC: 86 MG/DL (ref 65–99)
HDLC SERPL-MCNC: 58 MG/DL (ref 40–60)
LDLC SERPL CALC-MCNC: 134 MG/DL (ref 0–100)
POTASSIUM SERPL-SCNC: 4.1 MMOL/L (ref 3.5–5.2)
PROT SERPL-MCNC: 6.9 G/DL (ref 6–8.5)
SODIUM SERPL-SCNC: 143 MMOL/L (ref 136–145)
T4 FREE SERPL-MCNC: 1.11 NG/DL (ref 0.93–1.7)
TRIGL SERPL-MCNC: 73 MG/DL (ref 0–150)
TSH SERPL DL<=0.005 MIU/L-ACNC: 2.67 UIU/ML (ref 0.27–4.2)
VLDLC SERPL CALC-MCNC: 14.6 MG/DL

## 2020-05-28 DIAGNOSIS — E78.5 DYSLIPIDEMIA: Primary | ICD-10-CM

## 2020-05-28 DIAGNOSIS — E03.9 HYPOTHYROIDISM (ACQUIRED): ICD-10-CM

## 2020-05-28 RX ORDER — EZETIMIBE 10 MG/1
10 TABLET ORAL DAILY
Qty: 90 TABLET | Refills: 1 | Status: SHIPPED | OUTPATIENT
Start: 2020-05-28 | End: 2020-12-17

## 2020-05-28 RX ORDER — LEVOTHYROXINE SODIUM 0.12 MG/1
125 TABLET ORAL DAILY
Qty: 90 TABLET | Refills: 1 | Status: SHIPPED | OUTPATIENT
Start: 2020-05-28 | End: 2021-02-09 | Stop reason: SDUPTHER

## 2020-08-15 ENCOUNTER — HOSPITAL ENCOUNTER (EMERGENCY)
Facility: HOSPITAL | Age: 75
Discharge: HOME | End: 2020-08-15
Attending: EMERGENCY MEDICINE | Admitting: EMERGENCY MEDICINE

## 2020-08-15 ENCOUNTER — APPOINTMENT (OUTPATIENT)
Dept: GENERAL RADIOLOGY | Facility: HOSPITAL | Age: 75
End: 2020-08-15

## 2020-08-15 VITALS
DIASTOLIC BLOOD PRESSURE: 51 MMHG | HEART RATE: 76 BPM | OXYGEN SATURATION: 95 % | WEIGHT: 150 LBS | TEMPERATURE: 98.9 F | HEIGHT: 67 IN | RESPIRATION RATE: 16 BRPM | BODY MASS INDEX: 23.54 KG/M2 | SYSTOLIC BLOOD PRESSURE: 113 MMHG

## 2020-08-15 DIAGNOSIS — J18.9 PNEUMONIA DUE TO INFECTIOUS ORGANISM, UNSPECIFIED LATERALITY, UNSPECIFIED PART OF LUNG: Primary | ICD-10-CM

## 2020-08-15 LAB
ALBUMIN SERPL-MCNC: 3.6 G/DL (ref 3.5–5.2)
ALBUMIN/GLOB SERPL: 0.9 G/DL
ALP SERPL-CCNC: 77 U/L (ref 39–117)
ALT SERPL W P-5'-P-CCNC: 40 U/L (ref 1–33)
ANION GAP SERPL CALCULATED.3IONS-SCNC: 13 MMOL/L (ref 5–15)
AST SERPL-CCNC: 51 U/L (ref 1–32)
BACTERIA UR QL AUTO: ABNORMAL /HPF
BASOPHILS # BLD AUTO: 0.05 10*3/MM3 (ref 0–0.2)
BASOPHILS NFR BLD AUTO: 0.5 % (ref 0–1.5)
BILIRUB SERPL-MCNC: 0.9 MG/DL (ref 0–1.2)
BILIRUB UR QL STRIP: ABNORMAL
BUN SERPL-MCNC: 11 MG/DL (ref 8–23)
BUN/CREAT SERPL: 10.4 (ref 7–25)
CALCIUM SPEC-SCNC: 8.3 MG/DL (ref 8.6–10.5)
CHLORIDE SERPL-SCNC: 96 MMOL/L (ref 98–107)
CLARITY UR: ABNORMAL
CO2 SERPL-SCNC: 25 MMOL/L (ref 22–29)
COLOR UR: ABNORMAL
CREAT SERPL-MCNC: 1.06 MG/DL (ref 0.57–1)
DEPRECATED RDW RBC AUTO: 45 FL (ref 37–54)
EOSINOPHIL # BLD AUTO: 0.02 10*3/MM3 (ref 0–0.4)
EOSINOPHIL NFR BLD AUTO: 0.2 % (ref 0.3–6.2)
ERYTHROCYTE [DISTWIDTH] IN BLOOD BY AUTOMATED COUNT: 13.2 % (ref 12.3–15.4)
GFR SERPL CREATININE-BSD FRML MDRD: 51 ML/MIN/1.73
GLOBULIN UR ELPH-MCNC: 3.8 GM/DL
GLUCOSE SERPL-MCNC: 126 MG/DL (ref 65–99)
GLUCOSE UR STRIP-MCNC: NEGATIVE MG/DL
HCT VFR BLD AUTO: 36.9 % (ref 34–46.6)
HGB BLD-MCNC: 12.4 G/DL (ref 12–15.9)
HGB UR QL STRIP.AUTO: ABNORMAL
HOLD SPECIMEN: NORMAL
HOLD SPECIMEN: NORMAL
HYALINE CASTS UR QL AUTO: ABNORMAL /LPF
IMM GRANULOCYTES # BLD AUTO: 0.06 10*3/MM3 (ref 0–0.05)
IMM GRANULOCYTES NFR BLD AUTO: 0.6 % (ref 0–0.5)
KETONES UR QL STRIP: ABNORMAL
LEUKOCYTE ESTERASE UR QL STRIP.AUTO: ABNORMAL
LIPASE SERPL-CCNC: 27 U/L (ref 13–60)
LYMPHOCYTES # BLD AUTO: 0.73 10*3/MM3 (ref 0.7–3.1)
LYMPHOCYTES NFR BLD AUTO: 7.6 % (ref 19.6–45.3)
MCH RBC QN AUTO: 31.2 PG (ref 26.6–33)
MCHC RBC AUTO-ENTMCNC: 33.6 G/DL (ref 31.5–35.7)
MCV RBC AUTO: 92.7 FL (ref 79–97)
MONOCYTES # BLD AUTO: 0.66 10*3/MM3 (ref 0.1–0.9)
MONOCYTES NFR BLD AUTO: 6.9 % (ref 5–12)
NEUTROPHILS NFR BLD AUTO: 8.11 10*3/MM3 (ref 1.7–7)
NEUTROPHILS NFR BLD AUTO: 84.2 % (ref 42.7–76)
NITRITE UR QL STRIP: POSITIVE
NRBC BLD AUTO-RTO: 0 /100 WBC (ref 0–0.2)
PH UR STRIP.AUTO: 5.5 [PH] (ref 5–8)
PLATELET # BLD AUTO: 213 10*3/MM3 (ref 140–450)
PMV BLD AUTO: 10.1 FL (ref 6–12)
POTASSIUM SERPL-SCNC: 3.6 MMOL/L (ref 3.5–5.2)
PROT SERPL-MCNC: 7.4 G/DL (ref 6–8.5)
PROT UR QL STRIP: ABNORMAL
RBC # BLD AUTO: 3.98 10*6/MM3 (ref 3.77–5.28)
RBC # UR: ABNORMAL /HPF
REF LAB TEST METHOD: ABNORMAL
SODIUM SERPL-SCNC: 134 MMOL/L (ref 136–145)
SP GR UR STRIP: >=1.03 (ref 1–1.03)
SQUAMOUS #/AREA URNS HPF: ABNORMAL /HPF
TROPONIN T SERPL-MCNC: <0.01 NG/ML (ref 0–0.03)
UROBILINOGEN UR QL STRIP: ABNORMAL
WBC # BLD AUTO: 9.63 10*3/MM3 (ref 3.4–10.8)
WBC UR QL AUTO: ABNORMAL /HPF
WHOLE BLOOD HOLD SPECIMEN: NORMAL
WHOLE BLOOD HOLD SPECIMEN: NORMAL

## 2020-08-15 PROCEDURE — P9612 CATHETERIZE FOR URINE SPEC: HCPCS

## 2020-08-15 PROCEDURE — 71045 X-RAY EXAM CHEST 1 VIEW: CPT

## 2020-08-15 PROCEDURE — 81001 URINALYSIS AUTO W/SCOPE: CPT | Performed by: EMERGENCY MEDICINE

## 2020-08-15 PROCEDURE — 99284 EMERGENCY DEPT VISIT MOD MDM: CPT

## 2020-08-15 PROCEDURE — 25010000002 CEFTRIAXONE SODIUM-DEXTROSE 1-3.74 GM-%(50ML) RECONSTITUTED SOLUTION: Performed by: EMERGENCY MEDICINE

## 2020-08-15 PROCEDURE — 84484 ASSAY OF TROPONIN QUANT: CPT | Performed by: EMERGENCY MEDICINE

## 2020-08-15 PROCEDURE — 80053 COMPREHEN METABOLIC PANEL: CPT | Performed by: EMERGENCY MEDICINE

## 2020-08-15 PROCEDURE — 96365 THER/PROPH/DIAG IV INF INIT: CPT

## 2020-08-15 PROCEDURE — 83690 ASSAY OF LIPASE: CPT | Performed by: EMERGENCY MEDICINE

## 2020-08-15 PROCEDURE — 93005 ELECTROCARDIOGRAM TRACING: CPT | Performed by: EMERGENCY MEDICINE

## 2020-08-15 PROCEDURE — 85025 COMPLETE CBC W/AUTO DIFF WBC: CPT | Performed by: EMERGENCY MEDICINE

## 2020-08-15 RX ORDER — ACETAMINOPHEN 325 MG/1
975 TABLET ORAL ONCE
Status: COMPLETED | OUTPATIENT
Start: 2020-08-15 | End: 2020-08-15

## 2020-08-15 RX ORDER — DOXYCYCLINE 100 MG/1
100 CAPSULE ORAL 2 TIMES DAILY
Qty: 14 CAPSULE | Refills: 0 | Status: SHIPPED | OUTPATIENT
Start: 2020-08-15 | End: 2020-08-22

## 2020-08-15 RX ORDER — CEFDINIR 300 MG/1
300 CAPSULE ORAL 2 TIMES DAILY
Qty: 14 CAPSULE | Refills: 0 | Status: SHIPPED | OUTPATIENT
Start: 2020-08-15 | End: 2020-08-22

## 2020-08-15 RX ORDER — SODIUM CHLORIDE 0.9 % (FLUSH) 0.9 %
10 SYRINGE (ML) INJECTION AS NEEDED
Status: DISCONTINUED | OUTPATIENT
Start: 2020-08-15 | End: 2020-08-15 | Stop reason: HOSPADM

## 2020-08-15 RX ORDER — IBUPROFEN 800 MG/1
800 TABLET ORAL ONCE
Status: COMPLETED | OUTPATIENT
Start: 2020-08-15 | End: 2020-08-15

## 2020-08-15 RX ORDER — CEFTRIAXONE 1 G/50ML
1 INJECTION, SOLUTION INTRAVENOUS ONCE
Status: COMPLETED | OUTPATIENT
Start: 2020-08-15 | End: 2020-08-15

## 2020-08-15 RX ADMIN — ACETAMINOPHEN 975 MG: 325 TABLET, FILM COATED ORAL at 16:10

## 2020-08-15 RX ADMIN — SODIUM CHLORIDE 500 ML: 9 INJECTION, SOLUTION INTRAVENOUS at 16:10

## 2020-08-15 RX ADMIN — SODIUM CHLORIDE 500 ML: 9 INJECTION, SOLUTION INTRAVENOUS at 14:49

## 2020-08-15 RX ADMIN — CEFTRIAXONE 1 G: 1 INJECTION, SOLUTION INTRAVENOUS at 15:58

## 2020-08-15 RX ADMIN — IBUPROFEN 800 MG: 800 TABLET, FILM COATED ORAL at 16:10

## 2020-08-15 NOTE — ED PROVIDER NOTES
Subjective   74-year-old female presents to the ED with a chief complaint of weakness.  The patient is stating that she just does not feel well.  She states that she has not felt well for a few days.  Notes that she has a cough that is not productive of sputum.  Notes a fever.  Complains of generalized fatigue.  No nausea vomiting diarrhea abdominal pain.  No chest pain.  No lightheadedness or dizziness.  No prior treatments or limiting factors.  No other complaints at this time.          Review of Systems   Constitutional: Positive for activity change, fatigue and fever.        Generalized weakness   Respiratory: Positive for cough. Negative for shortness of breath and wheezing.    Cardiovascular: Negative for chest pain and palpitations.   All other systems reviewed and are negative.      Past Medical History:   Diagnosis Date   • Acute maxillary sinusitis    • Anxiety    • Arthritis    • Depression    • Disc degeneration, lumbar    • Dyslipidemia    • Easy bruising    • Edema    • Hypercholesterolemia    • Hyperlipidemia    • Itching    • Melena    • Muscle weakness    • Nervousness    • Nicotine dependence    • Osteoarthritis    • Renal disorder    • Swelling of ankle joint    • Ureteral disorder    • Urinary frequency    • Varicose vein     Varicosity Changes lower legs   • Varicosity     veins- varicosity changes   • Xerosis cutis        Allergies   Allergen Reactions   • Crestor [Rosuvastatin] Myalgia   • Lipitor [Atorvastatin] Myalgia   • Livalo [Pitavastatin] Dizziness       Past Surgical History:   Procedure Laterality Date   • COSMETIC SURGERY      neck   • THYROID SURGERY      sub-total thyroidectomy       Family History   Problem Relation Age of Onset   • Hypertension Mother    • Other Mother         myocardial infarction   • Stroke Mother    • Alcohol abuse Father    • Breast cancer Maternal Aunt        Social History     Socioeconomic History   • Marital status:      Spouse name: Not on file    • Number of children: Not on file   • Years of education: Not on file   • Highest education level: Not on file   Tobacco Use   • Smoking status: Current Every Day Smoker     Packs/day: 0.50     Years: 60.00     Pack years: 30.00     Types: Cigarettes     Last attempt to quit: 3/9/2018     Years since quittin.4   • Smokeless tobacco: Never Used   Substance and Sexual Activity   • Alcohol use: Yes     Comment: unknown   • Drug use: No   • Sexual activity: Defer           Objective   Physical Exam   Constitutional: She is oriented to person, place, and time. No distress.   Elderly appearing.   HENT:   Head: Normocephalic and atraumatic.   Nose: Nose normal.   Eyes: Conjunctivae and EOM are normal.   Cardiovascular: Normal rate, regular rhythm and intact distal pulses.   Pulmonary/Chest: Effort normal and breath sounds normal. No respiratory distress.   Abdominal: Soft. She exhibits no distension. There is no tenderness. There is no guarding.   Musculoskeletal: She exhibits no edema or deformity.   Neurological: She is alert and oriented to person, place, and time. No cranial nerve deficit.   Skin: She is not diaphoretic.   Nursing note and vitals reviewed.      Procedures           ED Course  ED Course as of Aug 15 1651   Sat Aug 15, 2020   1606 EKG interpreted by me.  Sinus rhythm.  Rate of 89.  No ST segment or T wave changes.  Normal EKG    [CG]      ED Course User Index  [CG] Fabrizio Lujan, DO           PULSE OXIMETRY INTERPRETATION  Patient had a pulse ox of 96% on room air. This is a normal pulse oximetry reading.                                MDM  74-year-old female presents with cough and generalized weakness and fatigue.  Chest x-ray is concerning for pneumonia.  Laboratory results are reassuring.  She was treated symptomatically with IV fluid rehydration antipyretics and antibiotics.  Hemodynamically stable with a pulse ox of 96% on room air.  She is appropriate for discharge with antibiotics.   Strict return precautions given.  She is agreeable to this plan.      Final diagnoses:   Pneumonia due to infectious organism, unspecified laterality, unspecified part of lung            Fabrizio Lujan, DO  08/15/20 1999

## 2020-08-17 ENCOUNTER — EPISODE CHANGES (OUTPATIENT)
Dept: CASE MANAGEMENT | Facility: OTHER | Age: 75
End: 2020-08-17

## 2020-09-28 ENCOUNTER — FLU SHOT (OUTPATIENT)
Dept: INTERNAL MEDICINE | Facility: CLINIC | Age: 75
End: 2020-09-28

## 2020-09-28 DIAGNOSIS — Z23 NEED FOR INFLUENZA VACCINATION: ICD-10-CM

## 2020-09-28 PROCEDURE — 90694 VACC AIIV4 NO PRSRV 0.5ML IM: CPT | Performed by: PHYSICIAN ASSISTANT

## 2020-09-28 PROCEDURE — G0008 ADMIN INFLUENZA VIRUS VAC: HCPCS | Performed by: PHYSICIAN ASSISTANT

## 2020-11-01 ENCOUNTER — EPISODE CHANGES (OUTPATIENT)
Dept: CASE MANAGEMENT | Facility: OTHER | Age: 75
End: 2020-11-01

## 2020-12-17 DIAGNOSIS — E78.5 DYSLIPIDEMIA: ICD-10-CM

## 2020-12-17 RX ORDER — EZETIMIBE 10 MG/1
TABLET ORAL
Qty: 90 TABLET | Refills: 0 | Status: SHIPPED | OUTPATIENT
Start: 2020-12-17 | End: 2021-02-09 | Stop reason: SDUPTHER

## 2021-02-05 ENCOUNTER — TELEPHONE (OUTPATIENT)
Dept: NEUROLOGY | Facility: CLINIC | Age: 76
End: 2021-02-05

## 2021-02-05 NOTE — TELEPHONE ENCOUNTER
PT WOULD NEED TO BE SCHEDULED WITH [facility], SCHEDULE AS NEW PT FOR [facility] BUT ESTABLISHED TO CLINIC IN A FU SLOT.

## 2021-02-05 NOTE — TELEPHONE ENCOUNTER
Provider: DON LARA  Caller: CARLY ALBARRAN  Relationship to Patient:SELF    Reason for Call: PT CALLING NEEDING A FOLLOW UP APPT FOR ANA LAURA AND SHE NEEDS TO BE WITH A PROVIDER IN THE PRACTICE. I TOLD HER PROBABLY WITH DON KAUFMAN.    PLEASE ADVISE  When was the patient last seen: 1-      PLEASE CALL HER BACK -[phone]

## 2021-02-05 NOTE — TELEPHONE ENCOUNTER
CALLED PT TO SCHEDULE WITH DON KAUFMAN AND PT STATED THAT SHE GOT A HOLD OF DON LARA AND SHE IS GOING TO F/U WITH HER. SO PT DOES NOT NEED APPT.

## 2021-02-09 ENCOUNTER — OFFICE VISIT (OUTPATIENT)
Dept: INTERNAL MEDICINE | Facility: CLINIC | Age: 76
End: 2021-02-09

## 2021-02-09 VITALS
OXYGEN SATURATION: 98 % | DIASTOLIC BLOOD PRESSURE: 72 MMHG | TEMPERATURE: 97.2 F | HEART RATE: 95 BPM | WEIGHT: 157 LBS | HEIGHT: 67 IN | SYSTOLIC BLOOD PRESSURE: 132 MMHG | BODY MASS INDEX: 24.64 KG/M2

## 2021-02-09 DIAGNOSIS — Z00.00 MEDICARE ANNUAL WELLNESS VISIT, SUBSEQUENT: Primary | ICD-10-CM

## 2021-02-09 DIAGNOSIS — F17.210 CIGARETTE SMOKER: ICD-10-CM

## 2021-02-09 DIAGNOSIS — E03.9 HYPOTHYROIDISM (ACQUIRED): ICD-10-CM

## 2021-02-09 DIAGNOSIS — Z23 NEED FOR PNEUMOCOCCAL VACCINATION: ICD-10-CM

## 2021-02-09 DIAGNOSIS — E78.5 DYSLIPIDEMIA: ICD-10-CM

## 2021-02-09 DIAGNOSIS — Z13.820 SCREENING FOR OSTEOPOROSIS: ICD-10-CM

## 2021-02-09 DIAGNOSIS — R93.7 ABNORMAL FINDINGS ON DIAGNOSTIC IMAGING OF OTHER PARTS OF MUSCULOSKELETAL SYSTEM: ICD-10-CM

## 2021-02-09 PROCEDURE — G0439 PPPS, SUBSEQ VISIT: HCPCS | Performed by: PHYSICIAN ASSISTANT

## 2021-02-09 PROCEDURE — 99397 PER PM REEVAL EST PAT 65+ YR: CPT | Performed by: PHYSICIAN ASSISTANT

## 2021-02-09 PROCEDURE — 1126F AMNT PAIN NOTED NONE PRSNT: CPT | Performed by: PHYSICIAN ASSISTANT

## 2021-02-09 PROCEDURE — 1170F FXNL STATUS ASSESSED: CPT | Performed by: PHYSICIAN ASSISTANT

## 2021-02-09 PROCEDURE — 1159F MED LIST DOCD IN RCRD: CPT | Performed by: PHYSICIAN ASSISTANT

## 2021-02-09 RX ORDER — EZETIMIBE 10 MG/1
10 TABLET ORAL DAILY
Qty: 90 TABLET | Refills: 3 | Status: SHIPPED | OUTPATIENT
Start: 2021-02-09 | End: 2022-04-25 | Stop reason: SDUPTHER

## 2021-02-09 RX ORDER — LEVOTHYROXINE SODIUM 0.12 MG/1
125 TABLET ORAL DAILY
Qty: 90 TABLET | Refills: 1 | Status: SHIPPED | OUTPATIENT
Start: 2021-02-09 | End: 2021-08-12 | Stop reason: SDUPTHER

## 2021-02-09 NOTE — PROGRESS NOTES
The ABCs of the Annual Wellness Visit  Subsequent Medicare Wellness Visit    Chief Complaint   Patient presents with   • Medicare Wellness-subsequent       Subjective   History of Present Illness:  She has been unsuccessful in quitting smoking and declines smoking cessation assistance at this time.         Daxa Reyes is a 75 y.o. female who presents for a Subsequent Medicare Wellness Visit.    HEALTH RISK ASSESSMENT    Recent Hospitalizations:  No hospitalization(s) within the last year.    Current Medical Providers:  Patient Care Team:  Molly Manning PA as PCP - General (Family Medicine)    Smoking Status:  Social History     Tobacco Use   Smoking Status Current Every Day Smoker   • Packs/day: 0.50   • Years: 60.00   • Pack years: 30.00   • Types: Cigarettes   Smokeless Tobacco Never Used       Alcohol Consumption:  Social History     Substance and Sexual Activity   Alcohol Use Yes    Comment: unknown       Depression Screen:   PHQ-2/PHQ-9 Depression Screening 2/9/2021   Little interest or pleasure in doing things 0   Feeling down, depressed, or hopeless 0   Total Score 0       Fall Risk Screen:  ABDULAZIZADI Fall Risk Assessment was completed, and patient is at LOW risk for falls.Assessment completed on:2/9/2021    Health Habits and Functional and Cognitive Screening:  Functional & Cognitive Status 2/9/2021   Do you have difficulty preparing food and eating? No   Do you have difficulty bathing yourself, getting dressed or grooming yourself? No   Do you have difficulty using the toilet? No   Do you have difficulty moving around from place to place? No   Do you have trouble with steps or getting out of a bed or a chair? No   Current Diet -   Dental Exam -   Eye Exam -   Exercise (times per week) -   Current Exercise Activities Include -   Do you need help using the phone?  No   Are you deaf or do you have serious difficulty hearing?  Yes   Do you need help with transportation? No   Do you need help  shopping? No   Do you need help preparing meals?  No   Do you need help with housework?  No   Do you need help with laundry? No   Do you need help taking your medications? No   Do you need help managing money? No   Do you ever drive or ride in a car without wearing a seat belt? Yes   Have you felt unusual stress, anger or loneliness in the last month? No   Who do you live with? Spouse   If you need help, do you have trouble finding someone available to you? No   Have you been bothered in the last four weeks by sexual problems? No   Do you have difficulty concentrating, remembering or making decisions? No         Does the patient have evidence of cognitive impairment? No    Asprin use counseling:Start ASA 81 mg daily     Age-appropriate Screening Schedule:  Refer to the list below for future screening recommendations based on patient's age, sex and/or medical conditions. Orders for these recommended tests are listed in the plan section. The patient has been provided with a written plan.    Health Maintenance   Topic Date Due   • DXA SCAN  06/24/2017   • ZOSTER VACCINE (1 of 2) 02/18/2022 (Originally 12/14/1995)   • LIPID PANEL  05/26/2021   • MAMMOGRAM  09/16/2021   • TDAP/TD VACCINES (2 - Td) 10/09/2022   • COLONOSCOPY  11/26/2022   • INFLUENZA VACCINE  Completed          The following portions of the patient's history were reviewed and updated as appropriate: allergies, current medications, past family history, past medical history, past social history, past surgical history and problem list.    Outpatient Medications Prior to Visit   Medication Sig Dispense Refill   • busPIRone (BUSPAR) 30 MG tablet Take 1 tablet by mouth 2 (Two) Times a Day. (Patient taking differently: Take 15 mg by mouth 2 (Two) Times a Day.) 180 tablet 1   • Coenzyme Q10 (CO Q-10) 150 MG capsule Take  by mouth.     • DULoxetine HCl 40 MG capsule delayed-release particles TAKE 1 CAPSULE DAILY 90 capsule 3   • estradiol (ESTRACE) 1 MG tablet  Take 1 tablet by mouth Daily. 1/2 tab daily     • medroxyPROGESTERone (PROVERA) 5 MG tablet TAKE 1 TABLET BY MOUTH ONCE DAILY ON DAYS 1 THROUGH 10 EVERY MONTH     • Multiple Minerals-Vitamins (CALCIUM & VIT D3 BONE HEALTH PO) Take  by mouth.     • Multiple Vitamin (MULTIVITAMIN) capsule Take 1 capsule by mouth daily.     • Omega-3 Fatty Acids (FISH OIL) 1000 MG capsule capsule Take  by mouth daily with breakfast.     • TURMERIC CURCUMIN PO Take  by mouth.     • ezetimibe (ZETIA) 10 MG tablet TAKE 1 TABLET DAILY 90 tablet 0   • levothyroxine (SYNTHROID, LEVOTHROID) 125 MCG tablet Take 1 tablet by mouth Daily. 90 tablet 1   • albuterol (PROVENTIL HFA;VENTOLIN HFA) 108 (90 Base) MCG/ACT inhaler Inhale 2 puffs Every 4 (Four) Hours As Needed for Shortness of Air (chest tightness). 2 inhaler 3     No facility-administered medications prior to visit.        Patient Active Problem List   Diagnosis   • Centrilobular emphysema (CMS/HCC)   • Hypothyroidism   • Vitamin D deficiency   • Dyslipidemia   • ANA LAURA (obstructive sleep apnea)   • Vertigo   • History of arthritis   • History of osteoarthritis   • Renal disorder   • Xerosis cutis   • Anxiety   • Depressed   • Numbness in feet   • Varicose veins of both lower extremities   • Environmental allergies   • Encounter for screening for lung cancer       Advanced Care Planning:  ACP discussion was held with the patient during this visit. Patient does not have an advance directive, information provided.    Review of Systems   Constitutional: Negative for appetite change, chills, fatigue, fever and unexpected weight change.   HENT: Negative for congestion, ear pain, hearing loss, nosebleeds, postnasal drip, rhinorrhea, sore throat, tinnitus and trouble swallowing.    Eyes: Negative for photophobia, discharge and visual disturbance.   Respiratory: Negative for cough, chest tightness, shortness of breath and wheezing.    Cardiovascular: Negative for chest pain, palpitations and leg  "swelling.   Gastrointestinal: Negative for abdominal distention, abdominal pain, blood in stool, constipation, diarrhea, nausea and vomiting.   Endocrine: Negative for cold intolerance, heat intolerance, polydipsia, polyphagia and polyuria.   Genitourinary: Negative.    Musculoskeletal: Negative for arthralgias, back pain, joint swelling, myalgias, neck pain and neck stiffness.   Skin: Negative for color change, pallor, rash and wound.   Allergic/Immunologic: Negative for environmental allergies, food allergies and immunocompromised state.   Neurological: Negative for dizziness, tremors, seizures, weakness, numbness and headaches.   Hematological: Negative for adenopathy. Does not bruise/bleed easily.   Psychiatric/Behavioral: Negative for agitation, behavioral problems, confusion, hallucinations, self-injury and suicidal ideas. The patient is not nervous/anxious.        Compared to one year ago, the patient feels her physical health is the same.  Compared to one year ago, the patient feels her mental health is the same.    Reviewed chart for potential of high risk medication in the elderly: yes  Reviewed chart for potential of harmful drug interactions in the elderly:yes    Objective         Vitals:    02/09/21 1330   BP: 132/72   Pulse: 95   Temp: 97.2 °F (36.2 °C)   SpO2: 98%   Weight: 71.2 kg (157 lb)   Height: 170.2 cm (67.01\")   PainSc: 0-No pain       Body mass index is 24.58 kg/m².  Discussed the patient's BMI with her. The BMI is in the acceptable range.    Physical Exam  Vitals signs and nursing note reviewed.   Constitutional:       General: She is not in acute distress.     Appearance: She is well-developed and normal weight. She is not ill-appearing, toxic-appearing or diaphoretic.   HENT:      Head: Normocephalic and atraumatic.      Right Ear: Tympanic membrane, ear canal and external ear normal. There is no impacted cerumen.      Left Ear: Tympanic membrane, ear canal and external ear normal. There " is no impacted cerumen.   Eyes:      General: No scleral icterus.     Conjunctiva/sclera: Conjunctivae normal.      Pupils: Pupils are equal, round, and reactive to light.   Neck:      Musculoskeletal: Normal range of motion and neck supple. No neck rigidity or muscular tenderness.      Thyroid: No thyromegaly.   Cardiovascular:      Rate and Rhythm: Normal rate and regular rhythm.      Heart sounds: Normal heart sounds. No murmur. No friction rub. No gallop.    Pulmonary:      Effort: Pulmonary effort is normal. No respiratory distress.      Breath sounds: Normal breath sounds. No wheezing, rhonchi or rales.   Chest:      Chest wall: No tenderness.   Abdominal:      General: Bowel sounds are normal. There is no distension.      Palpations: Abdomen is soft. There is no mass.      Tenderness: There is no abdominal tenderness. There is no right CVA tenderness, left CVA tenderness or rebound.   Musculoskeletal: Normal range of motion.         General: No tenderness or deformity.      Right lower leg: No edema.      Left lower leg: No edema.   Lymphadenopathy:      Cervical: No cervical adenopathy.   Skin:     General: Skin is warm and dry.      Capillary Refill: Capillary refill takes less than 2 seconds.      Coloration: Skin is not pale.      Findings: No erythema or rash.   Neurological:      General: No focal deficit present.      Mental Status: She is alert and oriented to person, place, and time.      Cranial Nerves: No cranial nerve deficit.      Sensory: No sensory deficit.      Gait: Gait normal.      Deep Tendon Reflexes: Reflexes normal.   Psychiatric:         Mood and Affect: Mood normal.         Behavior: Behavior normal.         Thought Content: Thought content normal.         Judgment: Judgment normal.               Assessment/Plan   Medicare Risks and Personalized Health Plan  CMS Preventative Services Quick Reference  Advance Directive Discussion  Lung Cancer Risk  Osteoprorosis Risk  Tobacco  Use/Dependance (use dotphrase .tobaccocessation for documentation)    The above risks/problems have been discussed with the patient.  Pertinent information has been shared with the patient in the After Visit Summary.  Follow up plans and orders are seen below in the Assessment/Plan Section.    Diagnoses and all orders for this visit:    1. Medicare annual wellness visit, subsequent (Primary)    2. BMI 24.0-24.9, adult  Patient's Body mass index is 24.58 kg/m². BMI is within normal parameters. No follow-up required..    3. Hypothyroidism (acquired)  -     Continue: levothyroxine (SYNTHROID, LEVOTHROID) 125 MCG tablet; Take 1 tablet by mouth Daily.  Dispense: 90 tablet; Refill: 1  Last TSH in normal range.    4. Cigarette smoker  -     DEXA Bone Density Axial  -     CT Chest Low Dose Wo; Future  Risks and benefits of lung cancer screening discussed. Shared decision making employed in making decision to proceed with lung cancer screening. Patient is aware further testing may be needed and that CT scans occur yearly. Another risk is radiation exposure, cumulative over time, though low levels.  At this time the patient has no signs of lung cancer. Provided with copy of CT scan order.    5. Screening for osteoporosis  -     DEXA Bone Density Axial  At risk for osteoporosis due to long smoking history.    6. Need for pneumococcal vaccination  -     Pneumococcal Polysaccharide Vaccine 23-Valent Greater Than or Equal To 1yo Subcutaneous / IM; Future  Not administered today. She plans to return for immunization in around 6 weeks.     7. Abnormal findings on diagnostic imaging of other parts of musculoskeletal system   -     DEXA Bone Density Axial    8. Dyslipidemia  -     Continue: ezetimibe (ZETIA) 10 MG tablet; Take 1 tablet by mouth Daily.  Dispense: 90 tablet; Refill: 3        Follow Up:  Return in about 6 months (around 8/9/2021) for Next scheduled follow up.     An After Visit Summary and PPPS were given to the  patient.

## 2021-02-09 NOTE — PATIENT INSTRUCTIONS
Medicare Wellness  Personal Prevention Plan of Service     Date of Office Visit:  2021  Encounter Provider:  SILVIA Perez  Place of Service:  [facility]  Patient Name: Daxa Reyes  :  1945    As part of the Medicare Wellness portion of your visit today, we are providing you with this personalized preventive plan of services (PPPS). This plan is based upon recommendations of the [place] Preventive Services Task Force (USPSTF) and the Advisory Committee on Immunization Practices (ACIP).    This lists the preventive care services that should be considered, and provides dates of when you are due. Items listed as completed are up-to-date and do not require any further intervention.    Health Maintenance   Topic Date Due   • DXA SCAN  2017   • Pneumococcal Vaccine 65+ (2 of 2 - PPSV23) 2019   • ANNUAL WELLNESS VISIT  2020   • LUNG CANCER SCREENING  2021   • ZOSTER VACCINE (1 of 2) 2022 (Originally 1994)   • LIPID PANEL  2021   • MAMMOGRAM  2021   • TDAP/TD VACCINES (2 - Td) 10/09/2022   • COLONOSCOPY  2022   • INFLUENZA VACCINE  Completed   • MENINGOCOCCAL VACCINE  Aged Out   • HEPATITIS C SCREENING  Discontinued       Orders Placed This Encounter   Procedures   • DEXA Bone Density Axial   • CT Chest Low Dose Wo     Standing Status:   Future     Standing Expiration Date:   2022     Order Specific Question:   The patient is age 55-77:     Answer:   75     Order Specific Question:   The patient is a current smoker?     Answer:   Yes     Order Specific Question:   The patient has a smoking history of 30 pack-years or greater:     Answer:   Yes     Order Specific Question:   Actual pack - year smoking history (number):     Answer:   30     Order Specific Question:   Has the Patient had a Chest CT scan within the past 12 months?     Answer:   No     Order Specific Question:   Does the patient have  any clinical signs/symptoms of lung cancer?     Answer:   No     Order Specific Question:   The patient was engaged in shared decision-making for this test:     Answer:   Yes   • Pneumococcal Polysaccharide Vaccine 23-Valent Greater Than or Equal To 3yo Subcutaneous / IM     Standing Status:   Future       Return in about 6 months (around 8/9/2021) for Next scheduled follow up.

## 2021-02-23 ENCOUNTER — APPOINTMENT (OUTPATIENT)
Dept: BONE DENSITY | Facility: HOSPITAL | Age: 76
End: 2021-02-23

## 2021-02-23 ENCOUNTER — HOSPITAL ENCOUNTER (OUTPATIENT)
Dept: CT IMAGING | Facility: HOSPITAL | Age: 76
Discharge: HOME | End: 2021-02-23

## 2021-02-23 DIAGNOSIS — F17.210 CIGARETTE SMOKER: ICD-10-CM

## 2021-02-23 PROCEDURE — 71271 CT THORAX LUNG CANCER SCR C-: CPT

## 2021-02-23 PROCEDURE — 77080 DXA BONE DENSITY AXIAL: CPT

## 2021-05-04 DIAGNOSIS — F32.A DEPRESSION, UNSPECIFIED DEPRESSION TYPE: ICD-10-CM

## 2021-05-04 DIAGNOSIS — F41.9 ANXIETY: ICD-10-CM

## 2021-05-04 RX ORDER — DULOXETINE 40 MG/1
CAPSULE, DELAYED RELEASE ORAL
Qty: 90 CAPSULE | Refills: 3 | Status: SHIPPED | OUTPATIENT
Start: 2021-05-04

## 2021-07-01 ENCOUNTER — TELEPHONE (OUTPATIENT)
Dept: INTERNAL MEDICINE | Facility: CLINIC | Age: 76
End: 2021-07-01

## 2021-07-01 DIAGNOSIS — Z91.09 ENVIRONMENTAL ALLERGIES: Primary | ICD-10-CM

## 2021-07-07 ENCOUNTER — CLINICAL SUPPORT (OUTPATIENT)
Dept: INTERNAL MEDICINE | Facility: CLINIC | Age: 76
End: 2021-07-07

## 2021-07-07 DIAGNOSIS — Z23 NEED FOR VACCINATION: Primary | ICD-10-CM

## 2021-07-07 PROCEDURE — 90732 PPSV23 VACC 2 YRS+ SUBQ/IM: CPT | Performed by: PHYSICIAN ASSISTANT

## 2021-07-07 PROCEDURE — G0009 ADMIN PNEUMOCOCCAL VACCINE: HCPCS | Performed by: PHYSICIAN ASSISTANT

## 2021-07-21 ENCOUNTER — OFFICE VISIT (OUTPATIENT)
Dept: OBSTETRICS AND GYNECOLOGY | Facility: CLINIC | Age: 76
End: 2021-07-21

## 2021-07-21 VITALS
HEIGHT: 67 IN | BODY MASS INDEX: 24.8 KG/M2 | WEIGHT: 158 LBS | SYSTOLIC BLOOD PRESSURE: 120 MMHG | DIASTOLIC BLOOD PRESSURE: 68 MMHG

## 2021-07-21 DIAGNOSIS — N93.9 VAGINAL BLEEDING: Primary | ICD-10-CM

## 2021-07-21 PROCEDURE — 99203 OFFICE O/P NEW LOW 30 MIN: CPT | Performed by: OBSTETRICS & GYNECOLOGY

## 2021-07-21 RX ORDER — MEDROXYPROGESTERONE ACETATE 5 MG/1
5 TABLET ORAL DAILY
Qty: 90 TABLET | Refills: 4 | Status: SHIPPED | OUTPATIENT
Start: 2021-07-21 | End: 2021-09-28 | Stop reason: HOSPADM

## 2021-07-21 RX ORDER — ESTRADIOL 1 MG/1
1 TABLET ORAL DAILY
Qty: 90 TABLET | Refills: 4 | Status: SHIPPED | OUTPATIENT
Start: 2021-07-21 | End: 2021-09-28 | Stop reason: HOSPADM

## 2021-07-21 NOTE — PROGRESS NOTES
Subjective   Chief Complaint   Patient presents with   • Vaginal Bleeding     pt states that when she swims and works out she has spotting when she wipes , pt is currently on Estradiol     Daxa Reyes is a 75 y.o. year old  ( x 1)  No LMP recorded. Patient is postmenopausal.  She presents to be seen because of bleeding. Noticed this after swimming.  Patient's been on estradiol 1 mg for many many years and then been taking Provera 5 mg for 10 days every 6 months to initiate a cycle.  Perhaps missed the last dosing of this given its relatively infrequent interval.  TVS within normal limits for patient Dr. Zelaya's office 2 years ago    OTHER COMPLAINTS:  Nothing else    The following portions of the patient's history were reviewed and updated as appropriate:  She  has a past medical history of Acute maxillary sinusitis, Anxiety, Arthritis, Depression, Disc degeneration, lumbar, Dyslipidemia, Easy bruising, Edema, Hypercholesterolemia, Hyperlipidemia, Itching, Melena, Muscle weakness, Nervousness, Nicotine dependence, Osteoarthritis, Renal disorder, Swelling of ankle joint, Ureteral disorder, Urinary frequency, Varicose vein, Varicosity, and Xerosis cutis.  She does not have any pertinent problems on file.  She  has a past surgical history that includes Thyroid surgery and Cosmetic surgery.  Her family history includes Alcohol abuse in her father; Breast cancer in her maternal aunt; Hypertension in her mother; Other in her mother; Stroke in her mother.  She  reports that she has been smoking cigarettes. She has a 30.00 pack-year smoking history. She has never used smokeless tobacco. She reports current alcohol use. She reports that she does not use drugs.  Current Outpatient Medications   Medication Sig Dispense Refill   • busPIRone (BUSPAR) 30 MG tablet Take 1 tablet by mouth 2 (Two) Times a Day. (Patient taking differently: Take 15 mg by mouth 2 (Two) Times a Day.) 180 tablet 1   • Coenzyme Q10 (CO  Q-10) 150 MG capsule Take  by mouth.     • DULoxetine HCl 40 MG capsule delayed-release particles TAKE 1 CAPSULE DAILY 90 capsule 3   • estradiol (ESTRACE) 1 MG tablet Take 1 tablet by mouth Daily. 1/2 tab daily     • ezetimibe (ZETIA) 10 MG tablet Take 1 tablet by mouth Daily. 90 tablet 3   • levothyroxine (SYNTHROID, LEVOTHROID) 125 MCG tablet Take 1 tablet by mouth Daily. 90 tablet 1   • medroxyPROGESTERone (PROVERA) 5 MG tablet TAKE 1 TABLET BY MOUTH ONCE DAILY ON DAYS 1 THROUGH 10 EVERY MONTH     • Multiple Minerals-Vitamins (CALCIUM & VIT D3 BONE HEALTH PO) Take  by mouth.     • Multiple Vitamin (MULTIVITAMIN) capsule Take 1 capsule by mouth daily.     • Omega-3 Fatty Acids (FISH OIL) 1000 MG capsule capsule Take  by mouth daily with breakfast.     • TURMERIC CURCUMIN PO Take  by mouth.       No current facility-administered medications for this visit.     Current Outpatient Medications on File Prior to Visit   Medication Sig   • busPIRone (BUSPAR) 30 MG tablet Take 1 tablet by mouth 2 (Two) Times a Day. (Patient taking differently: Take 15 mg by mouth 2 (Two) Times a Day.)   • Coenzyme Q10 (CO Q-10) 150 MG capsule Take  by mouth.   • DULoxetine HCl 40 MG capsule delayed-release particles TAKE 1 CAPSULE DAILY   • estradiol (ESTRACE) 1 MG tablet Take 1 tablet by mouth Daily. 1/2 tab daily   • ezetimibe (ZETIA) 10 MG tablet Take 1 tablet by mouth Daily.   • levothyroxine (SYNTHROID, LEVOTHROID) 125 MCG tablet Take 1 tablet by mouth Daily.   • medroxyPROGESTERone (PROVERA) 5 MG tablet TAKE 1 TABLET BY MOUTH ONCE DAILY ON DAYS 1 THROUGH 10 EVERY MONTH   • Multiple Minerals-Vitamins (CALCIUM & VIT D3 BONE HEALTH PO) Take  by mouth.   • Multiple Vitamin (MULTIVITAMIN) capsule Take 1 capsule by mouth daily.   • Omega-3 Fatty Acids (FISH OIL) 1000 MG capsule capsule Take  by mouth daily with breakfast.   • TURMERIC CURCUMIN PO Take  by mouth.     No current facility-administered medications on file prior to visit.  "    She is allergic to crestor [rosuvastatin], lipitor [atorvastatin], and livalo [pitavastatin].    Social History    Tobacco Use      Smoking status: Current Every Day Smoker        Packs/day: 0.50        Years: 60.00        Pack years: 30        Types: Cigarettes      Smokeless tobacco: Never Used    Review of Systems  Consitutional POS: nothing reported    NEG: anorexia or night sweats   Gastointestinal POS: nothing reported    NEG: bloating, change in bowel habits, melena or reflux symptoms   Genitourinary POS: nothing reported    NEG: dysuria or hematuria   Integument POS: nothing reported    NEG: moles that are changing in size, shape, color or rashes   Breast POS: nothing reported    NEG: persistent breast lump, skin dimpling or nipple discharge         Respiratory: negative  Cardiovascular: negative          Objective   /68   Ht 170.2 cm (67\")   Wt 71.7 kg (158 lb)   BMI 24.75 kg/m²     General:  well developed; well nourished  no acute distress   Skin:  No suspicious lesions seen   Thyroid: normal to inspection and palpation   Lungs:  breathing is unlabored  clear to auscultation bilaterally   Heart:  regular rate and rhythm, S1, S2 normal, no murmur, click, rub or gallop   Breasts:  Not performed.   Abdomen: soft, non-tender; no masses  no umbilical or inguinal hernias are present  no hepato-splenomegaly   Pelvis: Clinical staff was present for exam  refused     Psychiatric: Alert and oriented ×3, mood and affect appropriate  HEENT: Atraumatic, normocephalic, normal scleral icterus  Extremities: 2+ pulses bilaterally, no edema      Lab Review   No data reviewed    Imaging   SCANNED - MAMMO (09/16/2020)         Assessment   1. Postmenopausal bleeding uncertain etiology whilst on hormone placement therapy     Plan   1. Plan continue estradiol 1 mg a day but would do daily progesterone with this-5 mg Provera daily  2. TVS 2 to 4 weeks    No orders of the defined types were placed in this " encounter.         This note was electronically signed.      July 21, 2021

## 2021-08-01 DIAGNOSIS — F41.9 ANXIETY: ICD-10-CM

## 2021-08-02 RX ORDER — BUSPIRONE HYDROCHLORIDE 30 MG/1
15 TABLET ORAL 2 TIMES DAILY
Qty: 90 TABLET | Refills: 1 | Status: SHIPPED | OUTPATIENT
Start: 2021-08-02 | End: 2021-12-30 | Stop reason: SDUPTHER

## 2021-08-03 ENCOUNTER — TELEPHONE (OUTPATIENT)
Dept: INTERNAL MEDICINE | Facility: CLINIC | Age: 76
End: 2021-08-03

## 2021-08-03 NOTE — TELEPHONE ENCOUNTER
Pt states she will be out of buspar by the end of week. Has appt on 8.12.2021. [facility]. Thank you.

## 2021-08-12 ENCOUNTER — OFFICE VISIT (OUTPATIENT)
Dept: INTERNAL MEDICINE | Facility: CLINIC | Age: 76
End: 2021-08-12

## 2021-08-12 VITALS
WEIGHT: 156 LBS | OXYGEN SATURATION: 96 % | SYSTOLIC BLOOD PRESSURE: 126 MMHG | TEMPERATURE: 97 F | DIASTOLIC BLOOD PRESSURE: 62 MMHG | BODY MASS INDEX: 24.48 KG/M2 | HEART RATE: 92 BPM | HEIGHT: 67 IN

## 2021-08-12 DIAGNOSIS — E03.9 HYPOTHYROIDISM (ACQUIRED): Primary | ICD-10-CM

## 2021-08-12 DIAGNOSIS — E78.5 DYSLIPIDEMIA: ICD-10-CM

## 2021-08-12 DIAGNOSIS — J01.40 SUBACUTE PANSINUSITIS: ICD-10-CM

## 2021-08-12 PROCEDURE — 99214 OFFICE O/P EST MOD 30 MIN: CPT | Performed by: PHYSICIAN ASSISTANT

## 2021-08-12 RX ORDER — LEVOTHYROXINE SODIUM 0.12 MG/1
125 TABLET ORAL DAILY
Qty: 90 TABLET | Refills: 1 | Status: SHIPPED | OUTPATIENT
Start: 2021-08-12 | End: 2021-12-08 | Stop reason: SDUPTHER

## 2021-08-12 NOTE — PROGRESS NOTES
Follow Up Office Visit      Patient Name: Daxa Reyes  : 1945   MRN: [id number]     Chief Complaint:    Chief Complaint   Patient presents with   • Follow-up     for anxiety, depression, dyslipidemia, and hypothyroidism. Pt states she's been taken abx since Monday for sinuses.       History of Present Illness: Daxa Reyes is a 75 y.o. female who is here today for follow up of depression with anxiety, hyperlipidemia and hypothyroidism.    She is due for recheck of fasting lipid panel.  She is unable to tolerate statins with previous failures to rosuvastatin, atorvastatin and pitavastatin.  She is taking Zetia 10 mg daily without any concerns.    She has been taking an unknown antibiotic she had on hand at home for the last 4 days due to suspected sinus infection. She reports some nasal congestion and lots of sneezing. No fever, cough, SOA or body aches.      She got a call this morning from  where she has been doing ovarian cancer screenings and she was told the ovaries look fine but she has thickened uterine lining and biopsy was recommended.  She has been experiencing vaginal spotting intermittently.  She has an appointment with gynecology for follow-up in 2 weeks.    Depression is stable with cymbalta 40 mg daily.  No SI or HI.     Subjective      I have reviewed and the following portions of the patient's history were updated as appropriate: past family history, past medical history, past social history, past surgical history and problem list.      Current Outpatient Medications:   •  busPIRone (BUSPAR) 30 MG tablet, Take 0.5 tablets by mouth 2 (Two) Times a Day., Disp: 90 tablet, Rfl: 1  •  Coenzyme Q10 (CO Q-10) 150 MG capsule, Take  by mouth., Disp: , Rfl:   •  DULoxetine HCl 40 MG capsule delayed-release particles, TAKE 1 CAPSULE DAILY, Disp: 90 capsule, Rfl: 3  •  estradiol (ESTRACE) 1 MG tablet, Take 1 tablet by mouth Daily., Disp: 90 tablet, Rfl: 4  •  ezetimibe (ZETIA) 10  "MG tablet, Take 1 tablet by mouth Daily., Disp: 90 tablet, Rfl: 3  •  levothyroxine (SYNTHROID, LEVOTHROID) 125 MCG tablet, Take 1 tablet by mouth Daily., Disp: 90 tablet, Rfl: 1  •  medroxyPROGESTERone (PROVERA) 5 MG tablet, Take 1 tablet by mouth Daily., Disp: 90 tablet, Rfl: 4  •  Multiple Minerals-Vitamins (CALCIUM & VIT D3 BONE HEALTH PO), Take  by mouth., Disp: , Rfl:   •  Multiple Vitamin (MULTIVITAMIN) capsule, Take 1 capsule by mouth daily., Disp: , Rfl:   •  Omega-3 Fatty Acids (FISH OIL) 1000 MG capsule capsule, Take  by mouth daily with breakfast., Disp: , Rfl:   •  TURMERIC CURCUMIN PO, Take  by mouth., Disp: , Rfl:     Allergies   Allergen Reactions   • Crestor [Rosuvastatin] Myalgia   • Lipitor [Atorvastatin] Myalgia   • Livalo [Pitavastatin] Dizziness       Objective     Physical Exam:  Vital Signs:   Vitals:    08/12/21 1448   BP: 126/62   Pulse: 92   Temp: 97 °F (36.1 °C)   SpO2: 96%   Weight: 70.8 kg (156 lb)   Height: 170.2 cm (67\")     Body mass index is 24.43 kg/m².    Physical Exam  Vitals and nursing note reviewed.   Constitutional:       General: She is not in acute distress.     Appearance: Normal appearance. She is well-developed and normal weight. She is not ill-appearing, toxic-appearing or diaphoretic.   HENT:      Head: Normocephalic and atraumatic.      Right Ear: Ear canal and external ear normal. There is no impacted cerumen.      Left Ear: Ear canal and external ear normal. There is no impacted cerumen.      Ears:      Comments: Bilateral TM effusions.      Mouth/Throat:      Mouth: Mucous membranes are moist.      Pharynx: No oropharyngeal exudate or posterior oropharyngeal erythema.      Comments: White postnasal drip.   Eyes:      General: No scleral icterus.     Extraocular Movements: Extraocular movements intact.      Conjunctiva/sclera: Conjunctivae normal.      Pupils: Pupils are equal, round, and reactive to light.   Cardiovascular:      Rate and Rhythm: Normal rate and " regular rhythm.      Heart sounds: Normal heart sounds. No murmur heard.   No friction rub. No gallop.    Pulmonary:      Effort: Pulmonary effort is normal. No respiratory distress.      Breath sounds: Normal breath sounds. No wheezing, rhonchi or rales.   Chest:      Chest wall: No tenderness.   Abdominal:      General: Bowel sounds are normal.      Palpations: Abdomen is soft.      Tenderness: There is no abdominal tenderness.   Musculoskeletal:         General: No tenderness or deformity. Normal range of motion.      Cervical back: Normal range of motion and neck supple. No rigidity or tenderness.      Right lower leg: No edema.      Left lower leg: No edema.   Lymphadenopathy:      Cervical: No cervical adenopathy.   Skin:     General: Skin is warm and dry.      Capillary Refill: Capillary refill takes less than 2 seconds.      Coloration: Skin is not jaundiced or pale.      Findings: No erythema or rash.   Neurological:      General: No focal deficit present.      Mental Status: She is alert and oriented to person, place, and time.      Cranial Nerves: No cranial nerve deficit.      Sensory: No sensory deficit.      Motor: No abnormal muscle tone.      Coordination: Coordination normal.      Gait: Gait normal.      Deep Tendon Reflexes: Reflexes normal.   Psychiatric:         Mood and Affect: Mood normal.         Behavior: Behavior normal.         Thought Content: Thought content normal.         Judgment: Judgment normal.               Assessment / Plan      Assessment/Plan:   Diagnoses and all orders for this visit:    1. Hypothyroidism (acquired) (Primary)  -     levothyroxine (SYNTHROID, LEVOTHROID) 125 MCG tablet; Take 1 tablet by mouth Daily.  Dispense: 90 tablet; Refill: 1  -     TSH Rfx On Abnormal To Free T4  -     Comprehensive Metabolic Panel  Clinically euthyroid, continue levothyroxine 125 mcg daily.    2. Dyslipidemia (chronic, unsure of stability)  -     Lipid Panel  -     Comprehensive Metabolic  Panel  Reassess.  Taking Zetia 10 mg daily as prescribed since May 2020.  Continue diet and exercise regimen.    3. Subacute pansinusitis  Continue antibiotic and begin use of Flonase and an antihistamine daily.           Follow Up:   Return in about 6 months (around 2/12/2022) for medicare wellness.    Patient was given instructions and counseling regarding her condition or for health maintenance advice. Please see specific information pulled into the AVS if appropriate.     Molly Manning PA-C  [facility]     Please note that portions of this note may have been completed with a voice recognition program. Efforts were made to edit the dictations, but occasionally words are mistranscribed.

## 2021-08-13 LAB
ALBUMIN SERPL-MCNC: 4.1 G/DL (ref 3.5–5.2)
ALBUMIN/GLOB SERPL: 1.6 G/DL
ALP SERPL-CCNC: 69 U/L (ref 39–117)
ALT SERPL-CCNC: 11 U/L (ref 1–33)
AST SERPL-CCNC: 22 U/L (ref 1–32)
BILIRUB SERPL-MCNC: 0.8 MG/DL (ref 0–1.2)
BUN SERPL-MCNC: 11 MG/DL (ref 8–23)
BUN/CREAT SERPL: 14.1 (ref 7–25)
CALCIUM SERPL-MCNC: 9.1 MG/DL (ref 8.6–10.5)
CHLORIDE SERPL-SCNC: 107 MMOL/L (ref 98–107)
CHOLEST SERPL-MCNC: 189 MG/DL (ref 0–200)
CO2 SERPL-SCNC: 25 MMOL/L (ref 22–29)
CREAT SERPL-MCNC: 0.78 MG/DL (ref 0.57–1)
GLOBULIN SER CALC-MCNC: 2.6 GM/DL
GLUCOSE SERPL-MCNC: 83 MG/DL (ref 65–99)
HDLC SERPL-MCNC: 73 MG/DL (ref 40–60)
LDLC SERPL CALC-MCNC: 105 MG/DL (ref 0–100)
POTASSIUM SERPL-SCNC: 4.2 MMOL/L (ref 3.5–5.2)
PROT SERPL-MCNC: 6.7 G/DL (ref 6–8.5)
SODIUM SERPL-SCNC: 139 MMOL/L (ref 136–145)
TRIGL SERPL-MCNC: 58 MG/DL (ref 0–150)
TSH SERPL DL<=0.005 MIU/L-ACNC: 0.61 UIU/ML (ref 0.27–4.2)
VLDLC SERPL CALC-MCNC: 11 MG/DL (ref 5–40)

## 2021-08-27 ENCOUNTER — TELEPHONE (OUTPATIENT)
Dept: OBSTETRICS AND GYNECOLOGY | Facility: CLINIC | Age: 76
End: 2021-08-27

## 2021-08-27 NOTE — TELEPHONE ENCOUNTER
----- Message from Randall Humphreys MD sent at 8/26/2021  5:50 PM EDT -----  Please have patient come in to discuss the thickened endometrial lining.  Other findings are normal.  Thank you

## 2021-09-08 ENCOUNTER — OFFICE VISIT (OUTPATIENT)
Dept: OBSTETRICS AND GYNECOLOGY | Facility: CLINIC | Age: 76
End: 2021-09-08

## 2021-09-08 VITALS
HEIGHT: 67 IN | WEIGHT: 156 LBS | DIASTOLIC BLOOD PRESSURE: 62 MMHG | SYSTOLIC BLOOD PRESSURE: 126 MMHG | BODY MASS INDEX: 24.48 KG/M2

## 2021-09-08 DIAGNOSIS — R93.89 THICKENED ENDOMETRIUM: Primary | ICD-10-CM

## 2021-09-08 PROCEDURE — 99213 OFFICE O/P EST LOW 20 MIN: CPT | Performed by: OBSTETRICS & GYNECOLOGY

## 2021-09-08 NOTE — PROGRESS NOTES
Subjective   Chief Complaint   Patient presents with   • endomtrial bx     Daxa Reyes is a 75 y.o. year old No obstetric history on file..  No LMP recorded. Patient is postmenopausal.  She presents to be seen because of endometrial biopsy secondary to thickened endometrial lining noted on ultrasound that was first picked up at [facility] ovarian cancer screening..     OTHER COMPLAINTS:  Nothing else    The following portions of the patient's history were reviewed and updated as appropriate:  She  has a past medical history of Acute maxillary sinusitis, Anxiety, Arthritis, Depression, Disc degeneration, lumbar, Dyslipidemia, Easy bruising, Edema, Hypercholesterolemia, Hyperlipidemia, Itching, Melena, Muscle weakness, Nervousness, Nicotine dependence, Osteoarthritis, Renal disorder, Swelling of ankle joint, Ureteral disorder, Urinary frequency, Varicose vein, Varicosity, and Xerosis cutis.  She does not have any pertinent problems on file.  She  has a past surgical history that includes Thyroid surgery and Cosmetic surgery.  Her family history includes Alcohol abuse in her father; Breast cancer in her maternal aunt; Hypertension in her mother; Other in her mother; Stroke in her mother.  She  reports that she has been smoking cigarettes. She has a 30.00 pack-year smoking history. She has never used smokeless tobacco. She reports current alcohol use. She reports that she does not use drugs.  Current Outpatient Medications   Medication Sig Dispense Refill   • busPIRone (BUSPAR) 30 MG tablet Take 0.5 tablets by mouth 2 (Two) Times a Day. 90 tablet 1   • Coenzyme Q10 (CO Q-10) 150 MG capsule Take  by mouth.     • DULoxetine HCl 40 MG capsule delayed-release particles TAKE 1 CAPSULE DAILY 90 capsule 3   • estradiol (ESTRACE) 1 MG tablet Take 1 tablet by mouth Daily. 90 tablet 4   • ezetimibe (ZETIA) 10 MG tablet Take 1 tablet by mouth Daily. 90 tablet 3   • levothyroxine (SYNTHROID, LEVOTHROID) 125 MCG tablet Take 1 tablet  by mouth Daily. 90 tablet 1   • medroxyPROGESTERone (PROVERA) 5 MG tablet Take 1 tablet by mouth Daily. 90 tablet 4   • Multiple Minerals-Vitamins (CALCIUM & VIT D3 BONE HEALTH PO) Take  by mouth.     • Multiple Vitamin (MULTIVITAMIN) capsule Take 1 capsule by mouth daily.     • Omega-3 Fatty Acids (FISH OIL) 1000 MG capsule capsule Take  by mouth daily with breakfast.     • TURMERIC CURCUMIN PO Take  by mouth.       No current facility-administered medications for this visit.     Current Outpatient Medications on File Prior to Visit   Medication Sig   • busPIRone (BUSPAR) 30 MG tablet Take 0.5 tablets by mouth 2 (Two) Times a Day.   • Coenzyme Q10 (CO Q-10) 150 MG capsule Take  by mouth.   • DULoxetine HCl 40 MG capsule delayed-release particles TAKE 1 CAPSULE DAILY   • estradiol (ESTRACE) 1 MG tablet Take 1 tablet by mouth Daily.   • ezetimibe (ZETIA) 10 MG tablet Take 1 tablet by mouth Daily.   • levothyroxine (SYNTHROID, LEVOTHROID) 125 MCG tablet Take 1 tablet by mouth Daily.   • medroxyPROGESTERone (PROVERA) 5 MG tablet Take 1 tablet by mouth Daily.   • Multiple Minerals-Vitamins (CALCIUM & VIT D3 BONE HEALTH PO) Take  by mouth.   • Multiple Vitamin (MULTIVITAMIN) capsule Take 1 capsule by mouth daily.   • Omega-3 Fatty Acids (FISH OIL) 1000 MG capsule capsule Take  by mouth daily with breakfast.   • TURMERIC CURCUMIN PO Take  by mouth.     No current facility-administered medications on file prior to visit.     She is allergic to crestor [rosuvastatin], lipitor [atorvastatin], and livalo [pitavastatin].    Social History    Tobacco Use      Smoking status: Current Every Day Smoker        Packs/day: 0.50        Years: 60.00        Pack years: 30        Types: Cigarettes      Smokeless tobacco: Never Used    Review of Systems  Consitutional POS: nothing reported    NEG: anorexia or night sweats   Gastointestinal POS: nothing reported    NEG: bloating, change in bowel habits, melena or reflux symptoms  "  Genitourinary POS: nothing reported    NEG: dysuria or hematuria   Integument POS: nothing reported    NEG: moles that are changing in size, shape, color or rashes   Breast POS: nothing reported    NEG: persistent breast lump, skin dimpling or nipple discharge         Respiratory: negative  Cardiovascular: negative          Objective   /62   Ht 170.2 cm (67\")   Wt 70.8 kg (156 lb)   BMI 24.43 kg/m²     General:  well developed; well nourished  no acute distress   Skin:  Not performed.   Thyroid: not examined   Lungs:  breathing is unlabored   Heart:  Not performed.   Breasts:  Not performed.   Abdomen: soft, non-tender; no masses  no umbilical or inguinal hernias are present  no hepato-splenomegaly   Pelvis: Clinical staff was present for exam  External genitalia:  normal appearance of the external genitalia including Bartholin's and [place]'s glands.  :  urethral meatus normal;  Vaginal:  normal pink mucosa without prolapse or lesions.  Cervix:  normal appearance.  Uterus:  normal size, shape and consistency.  Adnexa:  normal bimanual exam of the adnexa.  Rectal:  digital rectal exam not performed; anus visually normal appearing.     Psychiatric: Alert and oriented ×3, mood and affect appropriate  HEENT: Atraumatic, normocephalic, normal scleral icterus  Extremities: 2+ pulses bilaterally, no edema      Lab Review   No data reviewed    Imaging   US Non-ob Transvaginal (08/26/2021 15:19)         Assessment   1. Thickened endometrial lining noted on TVS  2. HRT recommended daily estrace provera daily     Plan   1. EMB done without complication after betadine prep, patient tolerated well, not a sigficant amount of tissues  2.   Repeat TVS 6 months.  No orders of the defined types were placed in this encounter.         This note was electronically signed.      September 8, 2021      "

## 2021-09-15 DIAGNOSIS — N93.9 ABNORMAL UTERINE BLEEDING (AUB): Primary | ICD-10-CM

## 2021-09-16 DIAGNOSIS — N93.9 ABNORMAL UTERINE BLEEDING (AUB): ICD-10-CM

## 2021-09-22 ENCOUNTER — PRE-ADMISSION TESTING (OUTPATIENT)
Dept: PREADMISSION TESTING | Facility: HOSPITAL | Age: 76
End: 2021-09-22

## 2021-09-22 ENCOUNTER — HOSPITAL ENCOUNTER (OUTPATIENT)
Dept: GENERAL RADIOLOGY | Facility: HOSPITAL | Age: 76
Discharge: HOME | End: 2021-09-22

## 2021-09-22 ENCOUNTER — OFFICE VISIT (OUTPATIENT)
Dept: OBSTETRICS AND GYNECOLOGY | Facility: CLINIC | Age: 76
End: 2021-09-22

## 2021-09-22 VITALS
DIASTOLIC BLOOD PRESSURE: 68 MMHG | HEIGHT: 67 IN | SYSTOLIC BLOOD PRESSURE: 134 MMHG | BODY MASS INDEX: 24.48 KG/M2 | WEIGHT: 156 LBS

## 2021-09-22 VITALS — BODY MASS INDEX: 24.17 KG/M2 | HEIGHT: 67 IN | WEIGHT: 154 LBS

## 2021-09-22 DIAGNOSIS — N85.02 COMPLEX ATYPICAL ENDOMETRIAL HYPERPLASIA: ICD-10-CM

## 2021-09-22 DIAGNOSIS — N85.02 COMPLEX ATYPICAL ENDOMETRIAL HYPERPLASIA: Primary | ICD-10-CM

## 2021-09-22 LAB
ANION GAP SERPL CALCULATED.3IONS-SCNC: 9.6 MMOL/L (ref 5–15)
BASOPHILS # BLD AUTO: 0.02 10*3/MM3 (ref 0–0.2)
BASOPHILS NFR BLD AUTO: 0.3 % (ref 0–1.5)
BILIRUB UR QL STRIP: NEGATIVE
BUN SERPL-MCNC: 10 MG/DL (ref 8–23)
BUN/CREAT SERPL: 12.2 (ref 7–25)
CALCIUM SPEC-SCNC: 9.2 MG/DL (ref 8.6–10.5)
CHLORIDE SERPL-SCNC: 103 MMOL/L (ref 98–107)
CLARITY UR: CLEAR
CO2 SERPL-SCNC: 27.4 MMOL/L (ref 22–29)
COLOR UR: YELLOW
CREAT SERPL-MCNC: 0.82 MG/DL (ref 0.57–1)
DEPRECATED RDW RBC AUTO: 46.5 FL (ref 37–54)
EOSINOPHIL # BLD AUTO: 0.2 10*3/MM3 (ref 0–0.4)
EOSINOPHIL NFR BLD AUTO: 3 % (ref 0.3–6.2)
ERYTHROCYTE [DISTWIDTH] IN BLOOD BY AUTOMATED COUNT: 13.3 % (ref 12.3–15.4)
GFR SERPL CREATININE-BSD FRML MDRD: 68 ML/MIN/1.73
GLUCOSE SERPL-MCNC: 95 MG/DL (ref 65–99)
GLUCOSE UR STRIP-MCNC: NEGATIVE MG/DL
HCT VFR BLD AUTO: 41.2 % (ref 34–46.6)
HGB BLD-MCNC: 13.7 G/DL (ref 12–15.9)
HGB UR QL STRIP.AUTO: NEGATIVE
IMM GRANULOCYTES # BLD AUTO: 0.01 10*3/MM3 (ref 0–0.05)
IMM GRANULOCYTES NFR BLD AUTO: 0.1 % (ref 0–0.5)
KETONES UR QL STRIP: NEGATIVE
LEUKOCYTE ESTERASE UR QL STRIP.AUTO: NEGATIVE
LYMPHOCYTES # BLD AUTO: 2.84 10*3/MM3 (ref 0.7–3.1)
LYMPHOCYTES NFR BLD AUTO: 42 % (ref 19.6–45.3)
MCH RBC QN AUTO: 31.5 PG (ref 26.6–33)
MCHC RBC AUTO-ENTMCNC: 33.3 G/DL (ref 31.5–35.7)
MCV RBC AUTO: 94.7 FL (ref 79–97)
MONOCYTES # BLD AUTO: 0.4 10*3/MM3 (ref 0.1–0.9)
MONOCYTES NFR BLD AUTO: 5.9 % (ref 5–12)
NEUTROPHILS NFR BLD AUTO: 3.29 10*3/MM3 (ref 1.7–7)
NEUTROPHILS NFR BLD AUTO: 48.7 % (ref 42.7–76)
NITRITE UR QL STRIP: NEGATIVE
NRBC BLD AUTO-RTO: 0 /100 WBC (ref 0–0.2)
PH UR STRIP.AUTO: 7.5 [PH] (ref 5–8)
PLATELET # BLD AUTO: 251 10*3/MM3 (ref 140–450)
PMV BLD AUTO: 10 FL (ref 6–12)
POTASSIUM SERPL-SCNC: 3.6 MMOL/L (ref 3.5–5.2)
PROT UR QL STRIP: NEGATIVE
RBC # BLD AUTO: 4.35 10*6/MM3 (ref 3.77–5.28)
SODIUM SERPL-SCNC: 140 MMOL/L (ref 136–145)
SP GR UR STRIP: 1.01 (ref 1–1.03)
UROBILINOGEN UR QL STRIP: NORMAL
WBC # BLD AUTO: 6.76 10*3/MM3 (ref 3.4–10.8)

## 2021-09-22 PROCEDURE — 71045 X-RAY EXAM CHEST 1 VIEW: CPT

## 2021-09-22 PROCEDURE — 93005 ELECTROCARDIOGRAM TRACING: CPT

## 2021-09-22 PROCEDURE — 81003 URINALYSIS AUTO W/O SCOPE: CPT

## 2021-09-22 PROCEDURE — 80048 BASIC METABOLIC PNL TOTAL CA: CPT

## 2021-09-22 PROCEDURE — 36415 COLL VENOUS BLD VENIPUNCTURE: CPT

## 2021-09-22 PROCEDURE — 99213 OFFICE O/P EST LOW 20 MIN: CPT | Performed by: OBSTETRICS & GYNECOLOGY

## 2021-09-22 PROCEDURE — 85025 COMPLETE CBC W/AUTO DIFF WBC: CPT

## 2021-09-22 NOTE — PROGRESS NOTES
Subjective   Chief Complaint   Patient presents with   • Follow-up     discuss D&C     Daxa YOUNGER Amy is a 75 y.o. year old No obstetric history on file..  No LMP recorded. Patient is postmenopausal.  She presents to be seen because of complex hyperplasia atypical.     OTHER COMPLAINTS:  Nothing else    The following portions of the patient's history were reviewed and updated as appropriate:  She  has a past medical history of Acute maxillary sinusitis, Anxiety, Arthritis, Depression, Disc degeneration, lumbar, Dyslipidemia, Easy bruising, Edema, Hypercholesterolemia, Hyperlipidemia, Itching, Melena, Muscle weakness, Nervousness, Nicotine dependence, Osteoarthritis, Renal disorder, Swelling of ankle joint, Ureteral disorder, Urinary frequency, Varicose vein, Varicosity, and Xerosis cutis.  She does not have any pertinent problems on file.  She  has a past surgical history that includes Thyroid surgery and Cosmetic surgery.  Her family history includes Alcohol abuse in her father; Breast cancer in her maternal aunt; Hypertension in her mother; Other in her mother; Stroke in her mother.  She  reports that she has been smoking cigarettes. She has a 30.00 pack-year smoking history. She has never used smokeless tobacco. She reports current alcohol use. She reports that she does not use drugs.  Current Outpatient Medications   Medication Sig Dispense Refill   • busPIRone (BUSPAR) 30 MG tablet Take 0.5 tablets by mouth 2 (Two) Times a Day. 90 tablet 1   • Coenzyme Q10 (CO Q-10) 150 MG capsule Take  by mouth.     • DULoxetine HCl 40 MG capsule delayed-release particles TAKE 1 CAPSULE DAILY 90 capsule 3   • estradiol (ESTRACE) 1 MG tablet Take 1 tablet by mouth Daily. 90 tablet 4   • ezetimibe (ZETIA) 10 MG tablet Take 1 tablet by mouth Daily. 90 tablet 3   • levothyroxine (SYNTHROID, LEVOTHROID) 125 MCG tablet Take 1 tablet by mouth Daily. 90 tablet 1   • medroxyPROGESTERone (PROVERA) 5 MG tablet Take 1 tablet by mouth  Daily. 90 tablet 4   • Multiple Minerals-Vitamins (CALCIUM & VIT D3 BONE HEALTH PO) Take  by mouth.     • Multiple Vitamin (MULTIVITAMIN) capsule Take 1 capsule by mouth daily.     • Omega-3 Fatty Acids (FISH OIL) 1000 MG capsule capsule Take  by mouth daily with breakfast.     • TURMERIC CURCUMIN PO Take  by mouth.       No current facility-administered medications for this visit.     Current Outpatient Medications on File Prior to Visit   Medication Sig   • busPIRone (BUSPAR) 30 MG tablet Take 0.5 tablets by mouth 2 (Two) Times a Day.   • Coenzyme Q10 (CO Q-10) 150 MG capsule Take  by mouth.   • DULoxetine HCl 40 MG capsule delayed-release particles TAKE 1 CAPSULE DAILY   • estradiol (ESTRACE) 1 MG tablet Take 1 tablet by mouth Daily.   • ezetimibe (ZETIA) 10 MG tablet Take 1 tablet by mouth Daily.   • levothyroxine (SYNTHROID, LEVOTHROID) 125 MCG tablet Take 1 tablet by mouth Daily.   • medroxyPROGESTERone (PROVERA) 5 MG tablet Take 1 tablet by mouth Daily.   • Multiple Minerals-Vitamins (CALCIUM & VIT D3 BONE HEALTH PO) Take  by mouth.   • Multiple Vitamin (MULTIVITAMIN) capsule Take 1 capsule by mouth daily.   • Omega-3 Fatty Acids (FISH OIL) 1000 MG capsule capsule Take  by mouth daily with breakfast.   • TURMERIC CURCUMIN PO Take  by mouth.     No current facility-administered medications on file prior to visit.     She is allergic to crestor [rosuvastatin], lipitor [atorvastatin], and livalo [pitavastatin].    Social History    Tobacco Use      Smoking status: Current Every Day Smoker        Packs/day: 0.50        Years: 60.00        Pack years: 30        Types: Cigarettes      Smokeless tobacco: Never Used    Review of Systems  Consitutional POS: nothing reported    NEG: anorexia or night sweats   Gastointestinal POS: nothing reported    NEG: bloating, change in bowel habits, melena or reflux symptoms   Genitourinary POS: nothing reported    NEG: dysuria or hematuria   Integument POS: nothing reported     "NEG: moles that are changing in size, shape, color or rashes   Breast POS: nothing reported    NEG: persistent breast lump, skin dimpling or nipple discharge         Respiratory: negative  Cardiovascular: negative          Objective   /68   Ht 170.2 cm (67\")   Wt 70.8 kg (156 lb)   BMI 24.43 kg/m²     General:  well developed; well nourished  no acute distress   Skin:  No suspicious lesions seen   Thyroid: not examined   Lungs:  breathing is unlabored  clear to auscultation bilaterally   Heart:  regular rate and rhythm, S1, S2 normal, no murmur, click, rub or gallop   Breasts:  Not performed.   Abdomen: soft, non-tender; no masses  no umbilical or inguinal hernias are present  no hepato-splenomegaly   Pelvis: Not performed.     Psychiatric: Alert and oriented ×3, mood and affect appropriate  HEENT: Atraumatic, normocephalic, normal scleral icterus  Extremities: 2+ pulses bilaterally, no edema      Lab Review   Tissue Pathology Exam (09/08/2021)      Imaging   US Non-ob Transvaginal (08/26/2021 15:19)         Assessment   1. Complex hyperplasia atypical-- discontinue all HRT     Plan   1. Hysteroscopy, D&C  2. R/B A    No orders of the defined types were placed in this encounter.         This note was electronically signed.      September 22, 2021      "

## 2021-09-23 LAB
QT INTERVAL: 450 MS
QTC INTERVAL: 422 MS

## 2021-09-27 ENCOUNTER — ANESTHESIA EVENT (OUTPATIENT)
Dept: PERIOP | Facility: HOSPITAL | Age: 76
End: 2021-09-27

## 2021-09-27 NOTE — ANESTHESIA PREPROCEDURE EVALUATION
Anesthesia Evaluation     Patient summary reviewed and Nursing notes reviewed   no history of anesthetic complications:  NPO Solid Status: > 8 hours  NPO Liquid Status: > 8 hours           Airway   Mallampati: II  TM distance: >3 FB  Neck ROM: full  No difficulty expected  Dental - normal exam     Pulmonary - normal exam   (+) a smoker Current, COPD moderate, sleep apnea on CPAP,   Cardiovascular - normal exam  Exercise tolerance: poor (<4 METS)    ECG reviewed    (+) hyperlipidemia,     ROS comment: EKG: Sinus bradycardia with sinus arrhythmia  delayed R wave progression    Neuro/Psych  (+) dizziness/light headedness (vertigo ), numbness, psychiatric history Anxiety and Depression,     GI/Hepatic/Renal/Endo    (+)   renal disease,     Musculoskeletal     Abdominal    Substance History   (+) alcohol use,      OB/GYN          Other   arthritis,      ROS/Med Hx Other: 13.7/41.2  K 3.6                Anesthesia Plan    ASA 3     MAC   (Risks and benefits discussed including risk of aspiration, recall and dental damage. All patient questions answered. Will continue with POC.)  intravenous induction     Anesthetic plan, all risks, benefits, and alternatives have been provided, discussed and informed consent has been obtained with: patient.    Plan discussed with CRNA.

## 2021-09-28 ENCOUNTER — HOSPITAL ENCOUNTER (OUTPATIENT)
Facility: HOSPITAL | Age: 76
Setting detail: HOSPITAL OUTPATIENT SURGERY
Discharge: HOME | End: 2021-09-28
Attending: OBSTETRICS & GYNECOLOGY | Admitting: OBSTETRICS & GYNECOLOGY

## 2021-09-28 ENCOUNTER — ANESTHESIA (OUTPATIENT)
Dept: PERIOP | Facility: HOSPITAL | Age: 76
End: 2021-09-28

## 2021-09-28 VITALS
OXYGEN SATURATION: 97 % | DIASTOLIC BLOOD PRESSURE: 70 MMHG | RESPIRATION RATE: 18 BRPM | HEART RATE: 60 BPM | TEMPERATURE: 97.7 F | SYSTOLIC BLOOD PRESSURE: 130 MMHG

## 2021-09-28 DIAGNOSIS — N85.02 COMPLEX ATYPICAL ENDOMETRIAL HYPERPLASIA: ICD-10-CM

## 2021-09-28 PROCEDURE — 25010000002 ONDANSETRON PER 1 MG: Performed by: NURSE ANESTHETIST, CERTIFIED REGISTERED

## 2021-09-28 PROCEDURE — 25010000002 FENTANYL CITRATE (PF) 50 MCG/ML SOLUTION: Performed by: NURSE ANESTHETIST, CERTIFIED REGISTERED

## 2021-09-28 PROCEDURE — 25010000002 MIDAZOLAM PER 1MG: Performed by: NURSE ANESTHETIST, CERTIFIED REGISTERED

## 2021-09-28 PROCEDURE — 25010000002 DEXAMETHASONE PER 1 MG: Performed by: NURSE ANESTHETIST, CERTIFIED REGISTERED

## 2021-09-28 PROCEDURE — 63710000001 ONDANSETRON PER 8 MG: Performed by: OBSTETRICS & GYNECOLOGY

## 2021-09-28 PROCEDURE — 58558 HYSTEROSCOPY BIOPSY: CPT | Performed by: OBSTETRICS & GYNECOLOGY

## 2021-09-28 PROCEDURE — 25010000003 LIDOCAINE 1 % SOLUTION: Performed by: OBSTETRICS & GYNECOLOGY

## 2021-09-28 PROCEDURE — 88305 TISSUE EXAM BY PATHOLOGIST: CPT | Performed by: OBSTETRICS & GYNECOLOGY

## 2021-09-28 PROCEDURE — 25010000002 KETOROLAC TROMETHAMINE PER 15 MG: Performed by: NURSE ANESTHETIST, CERTIFIED REGISTERED

## 2021-09-28 PROCEDURE — 25010000002 PROPOFOL 10 MG/ML EMULSION: Performed by: NURSE ANESTHETIST, CERTIFIED REGISTERED

## 2021-09-28 RX ORDER — DEXAMETHASONE SODIUM PHOSPHATE 4 MG/ML
INJECTION, SOLUTION INTRA-ARTICULAR; INTRALESIONAL; INTRAMUSCULAR; INTRAVENOUS; SOFT TISSUE AS NEEDED
Status: DISCONTINUED | OUTPATIENT
Start: 2021-09-28 | End: 2021-09-28 | Stop reason: SURG

## 2021-09-28 RX ORDER — LIDOCAINE HYDROCHLORIDE 20 MG/ML
INJECTION, SOLUTION INTRAVENOUS AS NEEDED
Status: DISCONTINUED | OUTPATIENT
Start: 2021-09-28 | End: 2021-09-28 | Stop reason: SURG

## 2021-09-28 RX ORDER — ONDANSETRON 2 MG/ML
INJECTION INTRAMUSCULAR; INTRAVENOUS AS NEEDED
Status: DISCONTINUED | OUTPATIENT
Start: 2021-09-28 | End: 2021-09-28 | Stop reason: SURG

## 2021-09-28 RX ORDER — MAGNESIUM HYDROXIDE 1200 MG/15ML
LIQUID ORAL AS NEEDED
Status: DISCONTINUED | OUTPATIENT
Start: 2021-09-28 | End: 2021-09-28 | Stop reason: HOSPADM

## 2021-09-28 RX ORDER — SODIUM CHLORIDE, SODIUM LACTATE, POTASSIUM CHLORIDE, CALCIUM CHLORIDE 600; 310; 30; 20 MG/100ML; MG/100ML; MG/100ML; MG/100ML
1000 INJECTION, SOLUTION INTRAVENOUS CONTINUOUS
Status: DISCONTINUED | OUTPATIENT
Start: 2021-09-28 | End: 2021-09-28 | Stop reason: HOSPADM

## 2021-09-28 RX ORDER — FENTANYL CITRATE 50 UG/ML
INJECTION, SOLUTION INTRAMUSCULAR; INTRAVENOUS AS NEEDED
Status: DISCONTINUED | OUTPATIENT
Start: 2021-09-28 | End: 2021-09-28 | Stop reason: SURG

## 2021-09-28 RX ORDER — KETOROLAC TROMETHAMINE 30 MG/ML
INJECTION, SOLUTION INTRAMUSCULAR; INTRAVENOUS AS NEEDED
Status: DISCONTINUED | OUTPATIENT
Start: 2021-09-28 | End: 2021-09-28 | Stop reason: SURG

## 2021-09-28 RX ORDER — HYDROCODONE BITARTRATE AND ACETAMINOPHEN 5; 325 MG/1; MG/1
1 TABLET ORAL EVERY 6 HOURS PRN
Qty: 4 TABLET | Refills: 0 | Status: SHIPPED | OUTPATIENT
Start: 2021-09-28 | End: 2022-08-15

## 2021-09-28 RX ORDER — LIDOCAINE HYDROCHLORIDE 10 MG/ML
INJECTION, SOLUTION INFILTRATION; PERINEURAL AS NEEDED
Status: DISCONTINUED | OUTPATIENT
Start: 2021-09-28 | End: 2021-09-28 | Stop reason: HOSPADM

## 2021-09-28 RX ORDER — HYDROCODONE BITARTRATE AND ACETAMINOPHEN 5; 325 MG/1; MG/1
1 TABLET ORAL ONCE AS NEEDED
Status: CANCELLED | OUTPATIENT
Start: 2021-09-28 | End: 2021-10-05

## 2021-09-28 RX ORDER — MIDAZOLAM HYDROCHLORIDE 2 MG/2ML
INJECTION, SOLUTION INTRAMUSCULAR; INTRAVENOUS AS NEEDED
Status: DISCONTINUED | OUTPATIENT
Start: 2021-09-28 | End: 2021-09-28 | Stop reason: SURG

## 2021-09-28 RX ORDER — ONDANSETRON 4 MG/1
4 TABLET, FILM COATED ORAL ONCE AS NEEDED
Status: COMPLETED | OUTPATIENT
Start: 2021-09-28 | End: 2021-09-28

## 2021-09-28 RX ORDER — IBUPROFEN 600 MG/1
600 TABLET ORAL EVERY 6 HOURS PRN
Status: CANCELLED | OUTPATIENT
Start: 2021-09-28

## 2021-09-28 RX ORDER — IBUPROFEN 600 MG/1
600 TABLET ORAL EVERY 6 HOURS PRN
Qty: 20 TABLET | Refills: 0 | Status: SHIPPED | OUTPATIENT
Start: 2021-09-28

## 2021-09-28 RX ORDER — KETAMINE HCL IN NACL, ISO-OSM 100MG/10ML
SYRINGE (ML) INJECTION AS NEEDED
Status: DISCONTINUED | OUTPATIENT
Start: 2021-09-28 | End: 2021-09-28 | Stop reason: SURG

## 2021-09-28 RX ADMIN — DEXAMETHASONE SODIUM PHOSPHATE 4 MG: 4 INJECTION, SOLUTION INTRAMUSCULAR; INTRAVENOUS at 07:28

## 2021-09-28 RX ADMIN — LIDOCAINE HYDROCHLORIDE 60 MG: 20 INJECTION, SOLUTION INTRAVENOUS at 07:26

## 2021-09-28 RX ADMIN — SODIUM CHLORIDE, POTASSIUM CHLORIDE, SODIUM LACTATE AND CALCIUM CHLORIDE 1000 ML: 600; 310; 30; 20 INJECTION, SOLUTION INTRAVENOUS at 06:25

## 2021-09-28 RX ADMIN — ONDANSETRON HYDROCHLORIDE 4 MG: 4 TABLET, FILM COATED ORAL at 08:21

## 2021-09-28 RX ADMIN — ONDANSETRON 4 MG: 2 INJECTION INTRAMUSCULAR; INTRAVENOUS at 07:28

## 2021-09-28 RX ADMIN — Medication 10 MG: at 07:23

## 2021-09-28 RX ADMIN — MIDAZOLAM HYDROCHLORIDE 1 MG: 1 INJECTION, SOLUTION INTRAMUSCULAR; INTRAVENOUS at 07:23

## 2021-09-28 RX ADMIN — KETOROLAC TROMETHAMINE 15 MG: 30 INJECTION, SOLUTION INTRAMUSCULAR at 07:28

## 2021-09-28 RX ADMIN — FENTANYL CITRATE 50 MCG: 50 INJECTION INTRAMUSCULAR; INTRAVENOUS at 07:23

## 2021-09-28 RX ADMIN — PROPOFOL 100 MCG/KG/MIN: 10 INJECTION, EMULSION INTRAVENOUS at 07:26

## 2021-09-28 NOTE — ANESTHESIA POSTPROCEDURE EVALUATION
Patient: Daxa Reyes    Procedure Summary     Date: 09/28/21 Room / Location: [facility] OR 2 /  [facility]    Anesthesia Start: 0718 Anesthesia Stop: 0753    Procedure: DILATATION AND CURETTAGE HYSTEROSCOPY (N/A Uterus) Diagnosis:       Complex atypical endometrial hyperplasia      (Complex atypical endometrial hyperplasia [N85.02])    Surgeons: Randall Humphreys MD Provider: Reina Arreaga CRNA    Anesthesia Type: MAC ASA Status: 3          Anesthesia Type: MAC    Vitals  Vitals Value Taken Time   /71 09/28/21 0754   Temp 97.7 °F (36.5 °C) 09/28/21 0754   Pulse 52 09/28/21 0754   Resp 16 09/28/21 0754   SpO2 96 % 09/28/21 0754           Post Anesthesia Care and Evaluation    Patient location during evaluation: PHASE II  Patient participation: complete - patient participated  Level of consciousness: awake and alert  Pain score: 0  Pain management: satisfactory to patient  Airway patency: patent  Anesthetic complications: No anesthetic complications  PONV Status: none  Cardiovascular status: acceptable and stable  Respiratory status: acceptable  Hydration status: acceptable

## 2021-09-30 LAB
LAB AP CASE REPORT: NORMAL
PATH REPORT.FINAL DX SPEC: NORMAL

## 2021-10-05 ENCOUNTER — OFFICE VISIT (OUTPATIENT)
Dept: OBSTETRICS AND GYNECOLOGY | Facility: CLINIC | Age: 76
End: 2021-10-05

## 2021-10-05 VITALS
WEIGHT: 154 LBS | HEIGHT: 67 IN | DIASTOLIC BLOOD PRESSURE: 74 MMHG | BODY MASS INDEX: 24.17 KG/M2 | SYSTOLIC BLOOD PRESSURE: 118 MMHG

## 2021-10-05 DIAGNOSIS — Z09 POSTOP CHECK: Primary | ICD-10-CM

## 2021-10-05 DIAGNOSIS — N85.02 COMPLEX ATYPICAL ENDOMETRIAL HYPERPLASIA: ICD-10-CM

## 2021-10-05 PROCEDURE — 99024 POSTOP FOLLOW-UP VISIT: CPT | Performed by: OBSTETRICS & GYNECOLOGY

## 2021-10-05 NOTE — PROGRESS NOTES
Subjective   Chief Complaint   Patient presents with   • Post-op     Daxa Reyes is a 75 y.o. year old No obstetric history on file..  No LMP recorded. Patient is postmenopausal.  She presents to be seen because of f/u D&C, path as noted below. Doing well. .     OTHER COMPLAINTS:  Nothing else    The following portions of the patient's history were reviewed and updated as appropriate:  She  has a past medical history of Acute maxillary sinusitis, Anxiety, Arthritis, COPD (chronic obstructive pulmonary disease) (HCC), Depression, Disc degeneration, lumbar, Disease of thyroid gland, Dyslipidemia, Easy bruising, Hypercholesterolemia, Hyperlipidemia, Muscle weakness, Nervousness, Nicotine dependence, Osteoarthritis, Sleep apnea, Urinary frequency, Varicose vein, Varicosity, and Xerosis cutis.  She does not have any pertinent problems on file.  She  has a past surgical history that includes Thyroid surgery; Cosmetic surgery; Colonoscopy; Esophagogastroduodenoscopy; and d & c hysteroscopy (N/A, 9/28/2021).  Her family history includes Alcohol abuse in her father; Breast cancer in her maternal aunt; Hypertension in her mother; Other in her mother; Stroke in her mother.  She  reports that she has been smoking cigarettes. She has a 30.00 pack-year smoking history. She has never used smokeless tobacco. She reports current alcohol use. She reports that she does not use drugs.  Current Outpatient Medications   Medication Sig Dispense Refill   • busPIRone (BUSPAR) 30 MG tablet Take 0.5 tablets by mouth 2 (Two) Times a Day. 90 tablet 1   • Coenzyme Q10 (CO Q-10) 150 MG capsule Take 1 capsule by mouth Daily.     • DULoxetine HCl 40 MG capsule delayed-release particles TAKE 1 CAPSULE DAILY (Patient taking differently: Take 1 capsule by mouth Daily.) 90 capsule 3   • ezetimibe (ZETIA) 10 MG tablet Take 1 tablet by mouth Daily. 90 tablet 3   • HYDROcodone-acetaminophen (NORCO) 5-325 MG per tablet Take 1 tablet by mouth Every  6 (Six) Hours As Needed for Severe Pain . 4 tablet 0   • ibuprofen (ADVIL,MOTRIN) 600 MG tablet Take 1 tablet by mouth Every 6 (Six) Hours As Needed for Mild Pain . 20 tablet 0   • levothyroxine (SYNTHROID, LEVOTHROID) 125 MCG tablet Take 1 tablet by mouth Daily. 90 tablet 1   • Multiple Minerals-Vitamins (CALCIUM & VIT D3 BONE HEALTH PO) Take  by mouth.     • Multiple Vitamin (MULTIVITAMIN) capsule Take 1 capsule by mouth daily.     • Omega-3 Fatty Acids (FISH OIL) 1000 MG capsule capsule Take 1,000 mg by mouth Daily With Breakfast.     • TURMERIC CURCUMIN PO Take  by mouth.       No current facility-administered medications for this visit.     Current Outpatient Medications on File Prior to Visit   Medication Sig   • busPIRone (BUSPAR) 30 MG tablet Take 0.5 tablets by mouth 2 (Two) Times a Day.   • Coenzyme Q10 (CO Q-10) 150 MG capsule Take 1 capsule by mouth Daily.   • DULoxetine HCl 40 MG capsule delayed-release particles TAKE 1 CAPSULE DAILY (Patient taking differently: Take 1 capsule by mouth Daily.)   • ezetimibe (ZETIA) 10 MG tablet Take 1 tablet by mouth Daily.   • HYDROcodone-acetaminophen (NORCO) 5-325 MG per tablet Take 1 tablet by mouth Every 6 (Six) Hours As Needed for Severe Pain .   • ibuprofen (ADVIL,MOTRIN) 600 MG tablet Take 1 tablet by mouth Every 6 (Six) Hours As Needed for Mild Pain .   • levothyroxine (SYNTHROID, LEVOTHROID) 125 MCG tablet Take 1 tablet by mouth Daily.   • Multiple Minerals-Vitamins (CALCIUM & VIT D3 BONE HEALTH PO) Take  by mouth.   • Multiple Vitamin (MULTIVITAMIN) capsule Take 1 capsule by mouth daily.   • Omega-3 Fatty Acids (FISH OIL) 1000 MG capsule capsule Take 1,000 mg by mouth Daily With Breakfast.   • TURMERIC CURCUMIN PO Take  by mouth.     No current facility-administered medications on file prior to visit.     She is allergic to crestor [rosuvastatin], lipitor [atorvastatin], and livalo [pitavastatin].    Social History    Tobacco Use      Smoking status: Current  "Every Day Smoker        Packs/day: 0.50        Years: 60.00        Pack years: 30        Types: Cigarettes      Smokeless tobacco: Never Used    Review of Systems  Consitutional POS: nothing reported    NEG: anorexia or night sweats   Gastointestinal POS: nothing reported    NEG: bloating, change in bowel habits, melena or reflux symptoms   Genitourinary POS: nothing reported    NEG: dysuria or hematuria   Integument POS: nothing reported    NEG: moles that are changing in size, shape, color or rashes   Breast POS: nothing reported    NEG: persistent breast lump, skin dimpling or nipple discharge         Pertinent items are noted in HPI.          Objective   /74   Ht 170.2 cm (67\")   Wt 69.9 kg (154 lb)   BMI 24.12 kg/m²     General:  well developed; well nourished  no acute distress   Skin:  Not performed.   Thyroid: not examined   Lungs:  breathing is unlabored   Heart:  Not performed.   Breasts:  Not performed.   Abdomen: soft, non-tender; no masses  no umbilical or inguinal hernias are present  no hepato-splenomegaly   Pelvis: Not performed.     Psychiatric: Alert and oriented ×3, mood and affect appropriate  HEENT: Atraumatic, normocephalic, normal scleral icterus  Extremities: 2+ pulses bilaterally, no edema      Lab Review   Tissue Pathology Exam (09/28/2021 07:40)      Imaging   No data reviewed        Assessment   1. 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 Patient requests Humberto  2. Amb ref placed    No orders of the defined types were placed in this encounter.         This note was electronically signed.      October 5, 2021      "

## 2021-11-03 ENCOUNTER — TRANSCRIBE ORDERS (OUTPATIENT)
Dept: LAB | Facility: HOSPITAL | Age: 76
End: 2021-11-03

## 2021-11-03 ENCOUNTER — LAB (OUTPATIENT)
Dept: LAB | Facility: HOSPITAL | Age: 76
End: 2021-11-03

## 2021-11-03 DIAGNOSIS — Z01.818 PRE-OPERATIVE CLEARANCE: ICD-10-CM

## 2021-11-03 DIAGNOSIS — Z01.818 PRE-OPERATIVE CLEARANCE: Primary | ICD-10-CM

## 2021-11-03 LAB — SARS-COV-2 RNA PNL SPEC NAA+PROBE: NOT DETECTED

## 2021-11-03 PROCEDURE — C9803 HOPD COVID-19 SPEC COLLECT: HCPCS

## 2021-11-03 PROCEDURE — U0004 COV-19 TEST NON-CDC HGH THRU: HCPCS

## 2021-12-08 DIAGNOSIS — E03.9 HYPOTHYROIDISM (ACQUIRED): ICD-10-CM

## 2021-12-08 RX ORDER — LEVOTHYROXINE SODIUM 0.12 MG/1
125 TABLET ORAL DAILY
Qty: 90 TABLET | Refills: 1 | Status: SHIPPED | OUTPATIENT
Start: 2021-12-08 | End: 2022-03-08

## 2021-12-08 NOTE — TELEPHONE ENCOUNTER
Caller: Daxa Reyes    Relationship: Self    Best call back number: [phone]    Requested Prescriptions:   Requested Prescriptions     Pending Prescriptions Disp Refills   • levothyroxine (SYNTHROID, LEVOTHROID) 125 MCG tablet 90 tablet 1     Sig: Take 1 tablet by mouth Daily.        Pharmacy where request should be sent: [facility] [address] [phone] [facility] [phone]      Additional details provided by patient: PATIENT STATED THAT COMPLETELY IS OUT OF MEDICATION AND PLEASE CALL PATIENT IF SHE NEEDS TO BE SEEN BECAUSE SHE IS PRIOR PATIENT OF TO[facility]    PLEASE ADVISE    Does the patient have less than a 3 day supply:  [x] Yes  [] No    Josh Bains Rep   12/08/21 14:34 EST

## 2021-12-08 NOTE — TELEPHONE ENCOUNTER
Rx Refill Note  Requested Prescriptions     Pending Prescriptions Disp Refills   • levothyroxine (SYNTHROID, LEVOTHROID) 125 MCG tablet 90 tablet 1     Sig: Take 1 tablet by mouth Daily.      Last office visit with prescribing clinician: 08/12/2021 Nigel     Next office visit with prescribing clinician:             Elyssa Odonnell LPN  12/08/21, 15:23 EST

## 2021-12-30 DIAGNOSIS — F41.9 ANXIETY: ICD-10-CM

## 2021-12-30 RX ORDER — BUSPIRONE HYDROCHLORIDE 30 MG/1
15 TABLET ORAL 2 TIMES DAILY
Qty: 90 TABLET | Refills: 1 | Status: SHIPPED | OUTPATIENT
Start: 2021-12-30 | End: 2022-02-15 | Stop reason: SDUPTHER

## 2021-12-30 NOTE — TELEPHONE ENCOUNTER
Caller: Daxa Reyes    Relationship: Self    Best call back number:[phone]    Requested Prescriptions:   Requested Prescriptions     Pending Prescriptions Disp Refills   • busPIRone (BUSPAR) 30 MG tablet 90 tablet 1     Sig: Take 0.5 tablets by mouth 2 (Two) Times a Day.        Pharmacy where request should be sent:    [facility] - [address] [phone] [facility] [phone] [facility]          Additional details provided by patient:     Does the patient have less than a 3 day supply:  [x] Yes  [] No    Josh Calvillo Rep   12/30/21 11:39 EST

## 2022-02-15 ENCOUNTER — OFFICE VISIT (OUTPATIENT)
Dept: INTERNAL MEDICINE | Facility: CLINIC | Age: 77
End: 2022-02-15

## 2022-02-15 VITALS
BODY MASS INDEX: 24.8 KG/M2 | SYSTOLIC BLOOD PRESSURE: 120 MMHG | OXYGEN SATURATION: 97 % | TEMPERATURE: 97.3 F | HEART RATE: 80 BPM | WEIGHT: 158 LBS | DIASTOLIC BLOOD PRESSURE: 78 MMHG | HEIGHT: 67 IN

## 2022-02-15 DIAGNOSIS — F17.200 SMOKER: ICD-10-CM

## 2022-02-15 DIAGNOSIS — E03.9 ACQUIRED HYPOTHYROIDISM: ICD-10-CM

## 2022-02-15 DIAGNOSIS — Z00.00 MEDICARE ANNUAL WELLNESS VISIT, SUBSEQUENT: Primary | ICD-10-CM

## 2022-02-15 DIAGNOSIS — Z87.891 PERSONAL HISTORY OF NICOTINE DEPENDENCE: ICD-10-CM

## 2022-02-15 DIAGNOSIS — F41.9 ANXIETY: ICD-10-CM

## 2022-02-15 LAB
T4 FREE SERPL-MCNC: 0.92 NG/DL (ref 0.93–1.7)
TSH SERPL DL<=0.005 MIU/L-ACNC: 3.9 UIU/ML (ref 0.27–4.2)

## 2022-02-15 PROCEDURE — G0439 PPPS, SUBSEQ VISIT: HCPCS | Performed by: FAMILY MEDICINE

## 2022-02-15 PROCEDURE — 99397 PER PM REEVAL EST PAT 65+ YR: CPT | Performed by: FAMILY MEDICINE

## 2022-02-15 PROCEDURE — 1170F FXNL STATUS ASSESSED: CPT | Performed by: FAMILY MEDICINE

## 2022-02-15 PROCEDURE — 1159F MED LIST DOCD IN RCRD: CPT | Performed by: FAMILY MEDICINE

## 2022-02-15 RX ORDER — CHLORAL HYDRATE 500 MG
1000 CAPSULE ORAL DAILY
COMMUNITY

## 2022-02-15 RX ORDER — BUSPIRONE HYDROCHLORIDE 30 MG/1
30 TABLET ORAL 2 TIMES DAILY
Qty: 180 TABLET | Refills: 3 | Status: SHIPPED | OUTPATIENT
Start: 2022-02-15 | End: 2022-04-04 | Stop reason: SDUPTHER

## 2022-02-15 NOTE — PROGRESS NOTES
The ABCs of the Annual Wellness Visit  Subsequent Medicare Wellness Visit    Chief Complaint   Patient presents with   • Medicare Wellness-subsequent      Subjective    History of Present Illness:  Daxa Reyes is a 76 y.o. female who presents for a Subsequent Medicare Wellness Visit.  Patient reports episode after hysterectomy where she felt very hyperactive.  She was told to have her thyroid checked.    The following portions of the patient's history were reviewed and   updated as appropriate: allergies, current medications, past family history, past medical history, past social history, past surgical history and problem list.    Compared to one year ago, the patient feels her physical   health is worse due recent hysterectomy    Compared to one year ago, the patient feels her mental   health is the same.    Recent Hospitalizations:  She was not admitted to the hospital during the last year.       Current Medical Providers:  Patient Care Team:  Molly Manning PA as PCP - General (Family Medicine)    Outpatient Medications Prior to Visit   Medication Sig Dispense Refill   • co-enzyme Q-10 30 MG capsule Take 150 mg by mouth Daily.     • DULoxetine HCl 40 MG capsule delayed-release particles TAKE 1 CAPSULE DAILY (Patient taking differently: Take 1 capsule by mouth Daily.) 90 capsule 3   • ezetimibe (ZETIA) 10 MG tablet Take 1 tablet by mouth Daily. 90 tablet 3   • HYDROcodone-acetaminophen (NORCO) 5-325 MG per tablet Take 1 tablet by mouth Every 6 (Six) Hours As Needed for Severe Pain . 4 tablet 0   • ibuprofen (ADVIL,MOTRIN) 600 MG tablet Take 1 tablet by mouth Every 6 (Six) Hours As Needed for Mild Pain . 20 tablet 0   • levothyroxine (SYNTHROID, LEVOTHROID) 125 MCG tablet Take 1 tablet by mouth Daily. 90 tablet 1   • Multiple Minerals-Vitamins (CALCIUM & VIT D3 BONE HEALTH PO) Take  by mouth.     • Multiple Vitamin (MULTIVITAMIN) capsule Take 1 capsule by mouth daily.     • Omega-3 Fatty Acids (fish  oil) 1000 MG capsule capsule Take 1,000 mg by mouth Daily.     • TURMERIC CURCUMIN PO Take  by mouth.     • busPIRone (BUSPAR) 30 MG tablet Take 0.5 tablets by mouth 2 (Two) Times a Day. 90 tablet 1   • Coenzyme Q10 (CO Q-10) 150 MG capsule Take 1 capsule by mouth Daily.     • Omega-3 Fatty Acids (FISH OIL) 1000 MG capsule capsule Take 1,000 mg by mouth Daily With Breakfast.       No facility-administered medications prior to visit.       Opioid medication/s are on active medication list.  and I have evaluated her active treatment plan and pain score trends (see table).  There were no vitals filed for this visit.  I have reviewed the chart for potential of high risk medication and harmful drug interactions in the elderly.            Aspirin is not on active medication list.  Aspirin use is not indicated based on review of current medical condition/s. Risk of harm outweighs potential benefits.  .    Patient Active Problem List   Diagnosis   • Centrilobular emphysema (HCC)   • Hypothyroidism   • Vitamin D deficiency   • Dyslipidemia   • ANA LAURA (obstructive sleep apnea)   • Vertigo   • History of arthritis   • History of osteoarthritis   • Renal disorder   • Xerosis cutis   • Anxiety   • Depressed   • Numbness in feet   • Varicose veins of both lower extremities   • Environmental allergies   • Encounter for screening for lung cancer   • Complex atypical endometrial hyperplasia     Advance Care Planning  Advance Directive is not on file.  ACP discussion was held with the patient during this visit. Patient has an advance directive (not in EMR), copy requested.    Review of Systems   Constitutional: Positive for fatigue. Negative for chills and fever.   HENT: Positive for congestion.    Eyes: Negative for visual disturbance.   Respiratory: Positive for cough, shortness of breath and wheezing.    Cardiovascular: Negative for chest pain.   Gastrointestinal: Negative for abdominal pain, blood in stool, constipation, nausea and  "vomiting.   Genitourinary: Negative for difficulty urinating.   Musculoskeletal: Positive for arthralgias.   Skin: Negative for rash.   Allergic/Immunologic: Positive for environmental allergies.   Neurological: Negative for weakness and numbness.   Hematological: Does not bruise/bleed easily.   Psychiatric/Behavioral: Positive for dysphoric mood. The patient is nervous/anxious.         Objective    Vitals:    02/15/22 0956   BP: 120/78   Pulse: 80   Temp: 97.3 °F (36.3 °C)   SpO2: 97%   Weight: 71.7 kg (158 lb)   Height: 170.2 cm (67\")     BMI Readings from Last 1 Encounters:   02/15/22 24.75 kg/m²   BMI is within normal parameters. No follow-up required.    Does the patient have evidence of cognitive impairment? No    Physical Exam  Constitutional:       General: She is not in acute distress.     Appearance: Normal appearance. She is well-developed.   HENT:      Head: Normocephalic and atraumatic.      Right Ear: Tympanic membrane and external ear normal.      Left Ear: Tympanic membrane and external ear normal.   Eyes:      Extraocular Movements: Extraocular movements intact.      Conjunctiva/sclera: Conjunctivae normal.      Pupils: Pupils are equal, round, and reactive to light.   Neck:      Vascular: No carotid bruit.   Cardiovascular:      Rate and Rhythm: Normal rate and regular rhythm.      Heart sounds: No murmur heard.      Pulmonary:      Effort: Pulmonary effort is normal. No respiratory distress.      Breath sounds: Normal breath sounds. No wheezing.   Abdominal:      General: Bowel sounds are normal. There is no distension.      Palpations: Abdomen is soft.      Tenderness: There is no abdominal tenderness.   Musculoskeletal:      Cervical back: Normal range of motion and neck supple.      Right lower leg: No edema.      Left lower leg: No edema.   Lymphadenopathy:      Cervical: No cervical adenopathy.   Skin:     General: Skin is warm and dry.   Neurological:      Mental Status: She is alert and " oriented to person, place, and time.      Cranial Nerves: No cranial nerve deficit.      Deep Tendon Reflexes: Reflexes normal.   Psychiatric:         Mood and Affect: Mood normal.         Behavior: Behavior normal.                 HEALTH RISK ASSESSMENT    Smoking Status:  Social History     Tobacco Use   Smoking Status Current Every Day Smoker   • Packs/day: 0.50   • Years: 60.00   • Pack years: 30.00   • Types: Cigarettes   Smokeless Tobacco Never Used     Alcohol Consumption:  Social History     Substance and Sexual Activity   Alcohol Use Yes    Comment: occasional     Fall Risk Screen:    STEADI Fall Risk Assessment was completed, and patient is at LOW risk for falls.Assessment completed on:2/15/2022    Depression Screening:  PHQ-2/PHQ-9 Depression Screening 2/15/2022   Little interest or pleasure in doing things 0   Feeling down, depressed, or hopeless 0   Total Score 0       Health Habits and Functional and Cognitive Screening:  Functional & Cognitive Status 2/15/2022   Do you have difficulty preparing food and eating? No   Do you have difficulty bathing yourself, getting dressed or grooming yourself? No   Do you have difficulty using the toilet? No   Do you have difficulty moving around from place to place? No   Do you have trouble with steps or getting out of a bed or a chair? No   Current Diet Well Balanced Diet   Dental Exam Up to date   Eye Exam Up to date   Exercise (times per week) 2 times per week   Current Exercises Include Walking   Current Exercise Activities Include -   Do you need help using the phone?  No   Are you deaf or do you have serious difficulty hearing?  No   Do you need help with transportation? No   Do you need help shopping? No   Do you need help preparing meals?  No   Do you need help with housework?  No   Do you need help with laundry? No   Do you need help taking your medications? No   Do you need help managing money? No   Do you ever drive or ride in a car without wearing a seat  belt? No   Have you felt unusual stress, anger or loneliness in the last month? No   Who do you live with? Spouse   If you need help, do you have trouble finding someone available to you? No   Have you been bothered in the last four weeks by sexual problems? No   Do you have difficulty concentrating, remembering or making decisions? No       Age-appropriate Screening Schedule:  Refer to the list below for future screening recommendations based on patient's age, sex and/or medical conditions. Orders for these recommended tests are listed in the plan section. The patient has been provided with a written plan.    Health Maintenance   Topic Date Due   • LIPID PANEL  08/13/2022   • MAMMOGRAM  09/17/2022   • TDAP/TD VACCINES (2 - Td or Tdap) 10/09/2022   • DXA SCAN  02/23/2023   • INFLUENZA VACCINE  Completed   • ZOSTER VACCINE  Completed              Assessment/Plan   CMS Preventative Services Quick Reference  Risk Factors Identified During Encounter  Immunizations Discussed/Encouraged (specific Immunizations; Tdap, Influenza, Pneumococcal 23, Shingrix and COVID19  Tobacco Use/Dependance (use dotphrase .tobaccocessation for documentation)  The above risks/problems have been discussed with the patient.  Follow up actions/plans if indicated are seen below in the Assessment/Plan Section.  Pertinent information has been shared with the patient in the After Visit Summary.    Diagnoses and all orders for this visit:    1. Medicare annual wellness visit, subsequent (Primary)    2. Anxiety  -     busPIRone (BUSPAR) 30 MG tablet; Take 1 tablet by mouth 2 (Two) Times a Day.  Dispense: 180 tablet; Refill: 3    3. Acquired hypothyroidism  -     TSH  -     T4, Free    4. Smoker  -      CT Chest Low Dose Cancer Screening WO    5. Personal history of nicotine dependence   -      CT Chest Low Dose Cancer Screening WO      Patient primarily up-to-date on health maintenance.  She will be due for Tdap later this year.  She is up-to-date on  colonoscopy, which will also be due later this year.  However, she has aged out of colonoscopies given that she is 76 years old.  Mammogram was performed last fall, but patient advised that she is also aged out of this preventative test.  Bone density test was performed last year, which was normal.  She is up-to-date on pneumonia vaccines, flu vaccine, all COVID vaccines, and Shingrix.  She is due for routine lung cancer screening.  Patient advised that thyroid studies were normal on last check.  She is no longer having symptoms that she did after her hysterectomy.  However, thyroid studies have been ordered for peace of mind.    Follow Up:   Return in about 6 months (around 8/15/2022) for thyroid, HPL.     An After Visit Summary and PPPS were made available to the patient.

## 2022-02-22 ENCOUNTER — HOSPITAL ENCOUNTER (OUTPATIENT)
Dept: CT IMAGING | Facility: HOSPITAL | Age: 77
Discharge: HOME | End: 2022-02-22
Admitting: FAMILY MEDICINE

## 2022-02-22 PROCEDURE — 71271 CT THORAX LUNG CANCER SCR C-: CPT

## 2022-02-24 ENCOUNTER — TELEPHONE (OUTPATIENT)
Dept: INTERNAL MEDICINE | Facility: CLINIC | Age: 77
End: 2022-02-24

## 2022-02-24 NOTE — TELEPHONE ENCOUNTER
Caller: Daxa Reyes    Relationship: Self    Best call back number: [phone]    Caller requesting test results: SELF    What test was performed: CT SCAN    When was the test performed: 2/22/22    Where was the test performed: [facility]    Additional notes:

## 2022-03-08 ENCOUNTER — TELEPHONE (OUTPATIENT)
Dept: INTERNAL MEDICINE | Facility: CLINIC | Age: 77
End: 2022-03-08

## 2022-03-08 DIAGNOSIS — E03.9 HYPOTHYROIDISM (ACQUIRED): ICD-10-CM

## 2022-03-08 RX ORDER — LEVOTHYROXINE SODIUM 137 UG/1
137 TABLET ORAL DAILY
Qty: 30 TABLET | Refills: 1 | Status: SHIPPED | OUTPATIENT
Start: 2022-03-08 | End: 2022-03-08 | Stop reason: SDUPTHER

## 2022-03-08 RX ORDER — LEVOTHYROXINE SODIUM 137 UG/1
137 TABLET ORAL DAILY
Qty: 90 TABLET | Refills: 0 | Status: SHIPPED | OUTPATIENT
Start: 2022-03-08 | End: 2022-05-16 | Stop reason: SDUPTHER

## 2022-03-08 NOTE — TELEPHONE ENCOUNTER
Pt seen Dr. Del Toro, as you were out of office, she recommended changing dosage of levothyroxine but nothing was sent in. Please advise.

## 2022-03-08 NOTE — TELEPHONE ENCOUNTER
I have changed levothyroxine to 137 mcg daily.  Please tell patient I have ordered thyroid function test to be done in 6 weeks.  If her thyroid tests are in normal range at that time we will send in a 1 year supply of this dose of medication.  Please make sure she is not taking a multivitamin or biotin supplement for 1 week prior to lab draw.  Thank you

## 2022-03-08 NOTE — TELEPHONE ENCOUNTER
Caller: Daxa Reyes    Relationship: Self    Best call back number: [phone]    What is the best time to reach you: ANYTIME     Who are you requesting to speak with (clinical staff, provider,  specific staff member): NURSE OR DOCTOR VERMEESCH        What was the call regarding: THE PATIENT STATES THAT SHE WAS SEEN ON 02/15/2022 AND THE DOCTOR WAS GOING TO INCREASE HER THYROID MEDICATION THE PATIENT STATES THAT SHE USUALLY GETS THE MEDICATION FROM MAIL ORDER BUT HAS NOT RECEIVED THE INCREASED DOSAGE THE PATIENT WOULD LIKE A CALL BACK TO FIND OUT IF THAT WAS CALLED IN FOR HER     Do you require a callback: YES

## 2022-03-08 NOTE — TELEPHONE ENCOUNTER
Attempted contacting Pt, no answer.    HUB TO  SHARE: Dr. Vermeesch changed levothyroxine to 137 mcg daily, this was sent to [facility] today.  Please tell patient Dr. Vermeesch ordered thyroid function test to be done in 6 weeks.  If her thyroid tests are in normal range at that time we will send in a 1 year supply of this dose of medication.  Please make sure she is not taking a multivitamin or biotin supplement for 1 week prior to lab draw.

## 2022-04-04 DIAGNOSIS — F41.9 ANXIETY: ICD-10-CM

## 2022-04-04 NOTE — TELEPHONE ENCOUNTER
Caller: Daxa Reyes    Relationship: Self    Best call back number: [phone]      Requested Prescriptions:   Requested Prescriptions     Pending Prescriptions Disp Refills   • busPIRone (BUSPAR) 30 MG tablet 180 tablet 3     Sig: Take 1 tablet by mouth 2 (Two) Times a Day.        Pharmacy where request should be sent: [facility] - [address] [phone] [facility] [phone]      Additional details provided by patient:     Does the patient have less than a 3 day supply:  [] Yes  [x] No    Josh Henson Rep   04/04/22 15:33 EDT

## 2022-04-05 RX ORDER — BUSPIRONE HYDROCHLORIDE 30 MG/1
30 TABLET ORAL 2 TIMES DAILY
Qty: 180 TABLET | Refills: 3 | Status: SHIPPED | OUTPATIENT
Start: 2022-04-05

## 2022-04-25 DIAGNOSIS — E78.5 DYSLIPIDEMIA: ICD-10-CM

## 2022-04-25 RX ORDER — EZETIMIBE 10 MG/1
10 TABLET ORAL DAILY
Qty: 90 TABLET | Refills: 3 | Status: SHIPPED | OUTPATIENT
Start: 2022-04-25

## 2022-05-09 LAB
T4 FREE SERPL-MCNC: 1.73 NG/DL (ref 0.93–1.7)
TSH SERPL DL<=0.005 MIU/L-ACNC: 1.48 UIU/ML (ref 0.27–4.2)

## 2022-05-12 DIAGNOSIS — E03.9 HYPOTHYROIDISM (ACQUIRED): ICD-10-CM

## 2022-05-16 ENCOUNTER — TELEPHONE (OUTPATIENT)
Dept: INTERNAL MEDICINE | Facility: CLINIC | Age: 77
End: 2022-05-16

## 2022-05-16 DIAGNOSIS — E03.9 HYPOTHYROIDISM (ACQUIRED): ICD-10-CM

## 2022-05-16 RX ORDER — LEVOTHYROXINE SODIUM 0.12 MG/1
125 TABLET ORAL DAILY
Qty: 90 TABLET | Refills: 1 | Status: SHIPPED | OUTPATIENT
Start: 2022-05-16 | End: 2022-06-16

## 2022-05-16 NOTE — TELEPHONE ENCOUNTER
Per recent result note:    Haritha Del Toro,    5/12/2022  5:37 PM EDT         Please notify patient thyroid is overactive. Please make sure she is not currently taking biotin or any supplements with biotin.  If not, will need to decrease dosage of synthroid.

## 2022-05-16 NOTE — TELEPHONE ENCOUNTER
Caller: Daxa Reyes    Relationship: Self    Best call back number: [phone]    What is the best time to reach you: ANY    Who are you requesting to speak with (clinical staff, provider,  specific staff member): NURSE    What was the call regarding: STATES DOES NOT TAKE BIOTIN IN ANY OF MEDICATIONS OR SUPPLEMENTS    Do you require a callback: YES, PLEASE-WHEN CALL IN MEDICATION. NEEDS TO GIVE INSURANCE [facility] APPR[facility].

## 2022-06-08 ENCOUNTER — TELEPHONE (OUTPATIENT)
Dept: INTERNAL MEDICINE | Facility: CLINIC | Age: 77
End: 2022-06-08

## 2022-06-08 NOTE — TELEPHONE ENCOUNTER
"Spoke with patient, she states she feels she is \"getting another UTI\", states she is \"confused which is my first sign I have a UTI\", advised patient she needs to be evaluated in the office and offered an appointment, patient declined.  Also offered the patient to go to the [facility], she also declined this.  "

## 2022-06-08 NOTE — TELEPHONE ENCOUNTER
Caller: GREG ALBARRAN    Relationship: Child    Best call back number: [phone]    What is the best time to reach you: ANYTIME    Who are you requesting to speak with (clinical staff, provider,  specific staff member): CLINICAL STAFF    Do you know the name of the person who called: DAUGHTER     What was the call regarding: PATIENTS DAUGHTER STATES THAT SHE WOULD LIKE A CALL REOCCURRING UTI SYPMTOMS.     Do you require a callback: YES

## 2022-06-14 ENCOUNTER — OFFICE VISIT (OUTPATIENT)
Dept: INTERNAL MEDICINE | Facility: CLINIC | Age: 77
End: 2022-06-14

## 2022-06-14 VITALS
HEIGHT: 67 IN | BODY MASS INDEX: 24.01 KG/M2 | WEIGHT: 153 LBS | SYSTOLIC BLOOD PRESSURE: 138 MMHG | OXYGEN SATURATION: 97 % | HEART RATE: 88 BPM | DIASTOLIC BLOOD PRESSURE: 82 MMHG | TEMPERATURE: 99.1 F

## 2022-06-14 DIAGNOSIS — R41.0 CONFUSION: ICD-10-CM

## 2022-06-14 DIAGNOSIS — F41.9 ANXIETY: ICD-10-CM

## 2022-06-14 DIAGNOSIS — E03.9 ACQUIRED HYPOTHYROIDISM: ICD-10-CM

## 2022-06-14 DIAGNOSIS — N30.00 ACUTE CYSTITIS WITHOUT HEMATURIA: Primary | ICD-10-CM

## 2022-06-14 LAB
BILIRUB BLD-MCNC: ABNORMAL MG/DL
CLARITY, POC: CLEAR
COLOR UR: ABNORMAL
EXPIRATION DATE: ABNORMAL
GLUCOSE UR STRIP-MCNC: NEGATIVE MG/DL
KETONES UR QL: ABNORMAL
LEUKOCYTE EST, POC: ABNORMAL
Lab: ABNORMAL
NITRITE UR-MCNC: NEGATIVE MG/ML
PH UR: 6 [PH] (ref 5–8)
PROT UR STRIP-MCNC: ABNORMAL MG/DL
RBC # UR STRIP: NEGATIVE /UL
SP GR UR: 1.02 (ref 1–1.03)
UROBILINOGEN UR QL: NORMAL

## 2022-06-14 PROCEDURE — 81003 URINALYSIS AUTO W/O SCOPE: CPT | Performed by: NURSE PRACTITIONER

## 2022-06-14 PROCEDURE — 99214 OFFICE O/P EST MOD 30 MIN: CPT | Performed by: NURSE PRACTITIONER

## 2022-06-14 RX ORDER — CEPHALEXIN 500 MG/1
500 CAPSULE ORAL 2 TIMES DAILY
Qty: 10 CAPSULE | Refills: 0 | Status: SHIPPED | OUTPATIENT
Start: 2022-06-14 | End: 2022-06-19

## 2022-06-14 RX ORDER — LEVOCETIRIZINE DIHYDROCHLORIDE 5 MG/1
TABLET, FILM COATED ORAL
COMMUNITY
Start: 2022-03-20

## 2022-06-14 NOTE — PROGRESS NOTES
Office Visit      Patient Name: Daxa Reyes  : 1945   MRN: [id number]   Care Team: Patient Care Team:  Haritha Del Toro DO as PCP - General (Family Medicine)    Chief Complaint  Anxiety and Difficulty Urinating    Subjective     Subjective      Daxa Reyes presents to [facility] for anxiety and UTI concerns.   Symptoms started about 1 week ago.  is here helping with the history.   She initially started getting confused which is usually her first indication she is getting a UTI. Had leftover augmentin from a previous prescription so started this about 3 days ago but now she is out. She did start to improve with the antibiotic. This morning while driving she became very anxious over thinking she was going to run out of gas and went to the wrong office for her appointment making her anxiety worse.   Denies dysuria, hematuria, constipation, diarrhea, inability to empty bladder, and substance abuse. Does endorse urinary urgency and frequency that is improving.   Anxiety has been worsening for about 2 months. Has had a total hysterectomy due to endometrial cancer in 2021 and was taken off hormonal therapy at that time. She also suffers from hypothyroidism and levothyroxine dose has been titrated several times since her operation. NO recent b12 level. She has not had any brain imaging. Currently takes duloxetine and buspirone for her anxiety and is compliant with her medication.     Review of Systems   Constitutional: Positive for fatigue. Negative for chills and fever.   Respiratory: Negative for shortness of breath.    Cardiovascular: Negative for chest pain.   Gastrointestinal: Negative for abdominal distention, abdominal pain, constipation and nausea.   Genitourinary: Positive for frequency and urgency. Negative for decreased libido, decreased urine volume, difficulty urinating, dysuria, flank pain and hematuria.   Neurological: Positive  "for confusion. Negative for headache.   Psychiatric/Behavioral: Negative for sleep disturbance. The patient is nervous/anxious.        Objective     Objective   Vital Signs:   /82   Pulse 88   Temp 99.1 °F (37.3 °C)   Ht 170.2 cm (67\")   Wt 69.4 kg (153 lb)   SpO2 97%   BMI 23.96 kg/m²     Physical Exam  Vitals and nursing note reviewed.   Constitutional:       General: She is not in acute distress.     Appearance: Normal appearance. She is not ill-appearing.   Cardiovascular:      Rate and Rhythm: Normal rate and regular rhythm.      Heart sounds: Normal heart sounds. No murmur heard.  Pulmonary:      Effort: Pulmonary effort is normal. No respiratory distress.      Breath sounds: Normal breath sounds. No wheezing.   Abdominal:      General: Bowel sounds are normal. There is no distension.      Palpations: Abdomen is soft.      Tenderness: There is no abdominal tenderness. There is no right CVA tenderness or left CVA tenderness.   Skin:     General: Skin is warm and dry.      Findings: No rash.   Neurological:      Mental Status: She is alert.   Psychiatric:         Mood and Affect: Mood is anxious.         Behavior: Behavior is hyperactive.         Thought Content: Thought content normal.          Assessment / Plan      Assessment & Plan   Problem List Items Addressed This Visit        Endocrine and Metabolic    Hypothyroidism    Relevant Orders    TSH Rfx On Abnormal To Free T4    CBC No Differential    Comprehensive metabolic panel    Vitamin B12    Update labs today. Continue levothyroxine at current dose for now and reiterated proper administration of medication.        Mental Health    Anxiety    Relevant Orders    TSH Rfx On Abnormal To Free T4    CBC No Differential    Comprehensive metabolic panel    Vitamin B12    Update labs as above today. Follow-up with OBGYN, discussed non-hormonal alternatives to anxiety as coming off of hormonal therapy may be contributing factor. Can try OTC black " cohosh or red clover. Consider changing duloxetine if symptoms persist and continue buspirone for now. Recommend stress management techniques, healthy diet, exercise as tolerated, sleep hygiene, and medication compliance.       Other Visit Diagnoses     Acute cystitis without hematuria    -  Primary    Relevant Medications    cephalexin (Keflex) 500 MG capsule    Other Relevant Orders    POCT urinalysis dipstick, automated (Completed)    Urine Culture - Urine, Urine, Clean Catch    Brief Urine Lab Results  (Last result in the past 365 days)      Color   Clarity   Blood   Leuk Est   Nitrite   Protein   CREAT   Urine HCG        06/14/22 1113 Lori   Clear   Negative   Trace   Negative   1+               UA likely not accurate due to taking antibiotics not prescribed, advised against this in the future and recommend she finish all antimicrobial therapy. Will treat with cephalexin and send culture. Take with food. Always wipe front to back, avoid baths, and push fluids. If confusion does not subside need to consider brain MRI if labs are normal.     Confusion        Relevant Orders    Urine Culture - Urine, Urine, Clean Catch    TSH Rfx On Abnormal To Free T4    CBC No Differential    Comprehensive metabolic panel    Vitamin B12    See above plan. If symptoms do not subside with treatment of cystitis needs MRI imaging of brain.  is present and agrees to drive her home.            Follow Up   Return if symptoms worsen or fail to improve.  Patient was given instructions and counseling regarding her condition or for health maintenance advice. Please see specific information pulled into the AVS if appropriate.     DON Sinha  [facility]

## 2022-06-15 LAB
ALBUMIN SERPL-MCNC: 4.4 G/DL (ref 3.5–5.2)
ALBUMIN/GLOB SERPL: 1.6 G/DL
ALP SERPL-CCNC: 77 U/L (ref 39–117)
ALT SERPL-CCNC: 11 U/L (ref 1–33)
AST SERPL-CCNC: 22 U/L (ref 1–32)
BILIRUB SERPL-MCNC: 0.9 MG/DL (ref 0–1.2)
BUN SERPL-MCNC: 14 MG/DL (ref 8–23)
BUN/CREAT SERPL: 15.2 (ref 7–25)
CALCIUM SERPL-MCNC: 9.1 MG/DL (ref 8.6–10.5)
CHLORIDE SERPL-SCNC: 102 MMOL/L (ref 98–107)
CO2 SERPL-SCNC: 25.1 MMOL/L (ref 22–29)
CREAT SERPL-MCNC: 0.92 MG/DL (ref 0.57–1)
EGFRCR SERPLBLD CKD-EPI 2021: 64.3 ML/MIN/1.73
ERYTHROCYTE [DISTWIDTH] IN BLOOD BY AUTOMATED COUNT: 12.8 % (ref 12.3–15.4)
GLOBULIN SER CALC-MCNC: 2.7 GM/DL
GLUCOSE SERPL-MCNC: 90 MG/DL (ref 65–99)
HCT VFR BLD AUTO: 41 % (ref 34–46.6)
HGB BLD-MCNC: 13.6 G/DL (ref 12–15.9)
MCH RBC QN AUTO: 30.6 PG (ref 26.6–33)
MCHC RBC AUTO-ENTMCNC: 33.2 G/DL (ref 31.5–35.7)
MCV RBC AUTO: 92.1 FL (ref 79–97)
PLATELET # BLD AUTO: 252 10*3/MM3 (ref 140–450)
POTASSIUM SERPL-SCNC: 4.6 MMOL/L (ref 3.5–5.2)
PROT SERPL-MCNC: 7.1 G/DL (ref 6–8.5)
RBC # BLD AUTO: 4.45 10*6/MM3 (ref 3.77–5.28)
SODIUM SERPL-SCNC: 141 MMOL/L (ref 136–145)
T4 FREE SERPL-MCNC: 1.06 NG/DL (ref 0.93–1.7)
TSH SERPL DL<=0.005 MIU/L-ACNC: 13.1 UIU/ML (ref 0.27–4.2)
VIT B12 SERPL-MCNC: 432 PG/ML (ref 211–946)
WBC # BLD AUTO: 6.57 10*3/MM3 (ref 3.4–10.8)

## 2022-06-16 DIAGNOSIS — E03.9 ACQUIRED HYPOTHYROIDISM: Primary | ICD-10-CM

## 2022-06-16 LAB
BACTERIA UR CULT: NO GROWTH
BACTERIA UR CULT: NORMAL

## 2022-06-16 RX ORDER — LEVOTHYROXINE SODIUM 137 UG/1
137 TABLET ORAL DAILY
Qty: 30 TABLET | Refills: 1 | Status: SHIPPED | OUTPATIENT
Start: 2022-06-16 | End: 2022-12-19

## 2022-08-15 ENCOUNTER — OFFICE VISIT (OUTPATIENT)
Dept: INTERNAL MEDICINE | Facility: CLINIC | Age: 77
End: 2022-08-15

## 2022-08-15 VITALS
SYSTOLIC BLOOD PRESSURE: 110 MMHG | HEIGHT: 67 IN | DIASTOLIC BLOOD PRESSURE: 60 MMHG | RESPIRATION RATE: 16 BRPM | BODY MASS INDEX: 24.11 KG/M2 | OXYGEN SATURATION: 97 % | WEIGHT: 153.6 LBS | HEART RATE: 89 BPM | TEMPERATURE: 96.7 F

## 2022-08-15 DIAGNOSIS — R41.3 MEMORY CHANGES: Primary | ICD-10-CM

## 2022-08-15 DIAGNOSIS — R35.0 URINARY FREQUENCY: ICD-10-CM

## 2022-08-15 DIAGNOSIS — E03.9 ACQUIRED HYPOTHYROIDISM: ICD-10-CM

## 2022-08-15 DIAGNOSIS — E78.5 DYSLIPIDEMIA: ICD-10-CM

## 2022-08-15 LAB
BILIRUB BLD-MCNC: NEGATIVE MG/DL
CLARITY, POC: CLEAR
COLOR UR: YELLOW
EXPIRATION DATE: NORMAL
GLUCOSE UR STRIP-MCNC: NEGATIVE MG/DL
KETONES UR QL: NEGATIVE
LEUKOCYTE EST, POC: NEGATIVE
Lab: NORMAL
NITRITE UR-MCNC: NEGATIVE MG/ML
PH UR: 6.5 [PH] (ref 5–8)
PROT UR STRIP-MCNC: NEGATIVE MG/DL
RBC # UR STRIP: NEGATIVE /UL
SP GR UR: 1.01 (ref 1–1.03)
UROBILINOGEN UR QL: NORMAL

## 2022-08-15 PROCEDURE — 81003 URINALYSIS AUTO W/O SCOPE: CPT | Performed by: FAMILY MEDICINE

## 2022-08-15 PROCEDURE — 99214 OFFICE O/P EST MOD 30 MIN: CPT | Performed by: FAMILY MEDICINE

## 2022-08-15 RX ORDER — AZELASTINE 1 MG/ML
SPRAY, METERED NASAL
COMMUNITY

## 2022-08-18 DIAGNOSIS — E03.9 ACQUIRED HYPOTHYROIDISM: Primary | ICD-10-CM

## 2022-08-18 LAB
CHOLEST SERPL-MCNC: 174 MG/DL (ref 0–200)
HDLC SERPL-MCNC: 50 MG/DL (ref 40–60)
LDLC SERPL CALC-MCNC: 99 MG/DL (ref 0–100)
T4 FREE SERPL-MCNC: 2.07 NG/DL (ref 0.93–1.7)
TRIGL SERPL-MCNC: 141 MG/DL (ref 0–150)
TSH SERPL DL<=0.005 MIU/L-ACNC: 0.01 UIU/ML (ref 0.27–4.2)
VLDLC SERPL CALC-MCNC: 25 MG/DL (ref 5–40)

## 2022-08-18 RX ORDER — LEVOTHYROXINE SODIUM 0.12 MG/1
125 TABLET ORAL EVERY OTHER DAY
Qty: 45 TABLET | Refills: 3 | Status: SHIPPED | OUTPATIENT
Start: 2022-08-18

## 2022-09-05 PROBLEM — R35.0 URINARY FREQUENCY: Status: ACTIVE | Noted: 2022-09-05

## 2022-09-05 PROBLEM — R41.3 MEMORY CHANGES: Status: ACTIVE | Noted: 2022-09-05

## 2022-09-05 PROCEDURE — U0004 COV-19 TEST NON-CDC HGH THRU: HCPCS | Performed by: NURSE PRACTITIONER

## 2022-09-05 NOTE — ASSESSMENT & PLAN NOTE
Felt to be secondary to overall feeling unwell.  Will obtain thyroid studies to ensure thyroid isn't contributing.

## 2022-10-10 ENCOUNTER — TELEPHONE (OUTPATIENT)
Dept: INTERNAL MEDICINE | Facility: CLINIC | Age: 77
End: 2022-10-10

## 2022-10-10 NOTE — TELEPHONE ENCOUNTER
Caller: Daxa Reyes    Relationship: Self    Best call back number:    [phone]        What orders are you requesting (i.e. lab or imaging): LABS    In what timeframe would the patient need to come in: ANY     Where will you receive your lab/imaging services: ANY    Additional notes: PATIENT WANTS TO KNOW IF THE DOCTOR NEEDS TO SEE HER BEFORE HER LABS. PLEASE CALL THE LET HER KNOW.

## 2022-10-18 LAB
T4 FREE SERPL-MCNC: 1.64 NG/DL (ref 0.93–1.7)
TSH SERPL DL<=0.005 MIU/L-ACNC: <0.005 UIU/ML (ref 0.27–4.2)

## 2022-11-08 ENCOUNTER — OFFICE VISIT (OUTPATIENT)
Dept: OBSTETRICS AND GYNECOLOGY | Facility: CLINIC | Age: 77
End: 2022-11-08

## 2022-11-08 VITALS — WEIGHT: 151.2 LBS | DIASTOLIC BLOOD PRESSURE: 62 MMHG | SYSTOLIC BLOOD PRESSURE: 110 MMHG | BODY MASS INDEX: 23.68 KG/M2

## 2022-11-08 DIAGNOSIS — N95.1 MENOPAUSAL SYMPTOMS: Primary | ICD-10-CM

## 2022-11-08 PROCEDURE — 99213 OFFICE O/P EST LOW 20 MIN: CPT | Performed by: OBSTETRICS & GYNECOLOGY

## 2022-11-08 RX ORDER — ESTRADIOL 0.04 MG/D
1 PATCH TRANSDERMAL WEEKLY
Qty: 4 EACH | Refills: 12 | Status: SHIPPED | OUTPATIENT
Start: 2022-11-08 | End: 2022-11-08 | Stop reason: SDUPTHER

## 2022-11-08 RX ORDER — ESTRADIOL 0.04 MG/D
1 PATCH TRANSDERMAL WEEKLY
Qty: 4 EACH | Refills: 12 | Status: SHIPPED | OUTPATIENT
Start: 2022-11-08

## 2022-11-08 NOTE — PROGRESS NOTES
Subjective   Chief Complaint   Patient presents with   • Follow-up     Discuss hormones, she is interested in the patch     Daxa Reyes is a 76 y.o. year old No obstetric history on file..  No LMP recorded. Patient is postmenopausal.  She presents to be seen because of severe menopausal symptoms. Patient had complete hysteretomy for using unopposed E2 for many years--Stage I well differentiated.      OTHER COMPLAINTS:  Nothing else    The following portions of the patient's history were reviewed and updated as appropriate:  She  has a past medical history of Acute maxillary sinusitis, Anxiety, Arthritis, COPD (chronic obstructive pulmonary disease) (HCC), Depression, Disc degeneration, lumbar, Disease of thyroid gland, Dyslipidemia, Easy bruising, Hypercholesterolemia, Hyperlipidemia, Muscle weakness, Nervousness, Nicotine dependence, Osteoarthritis, Sleep apnea, Urinary frequency, Varicose vein, Varicosity, and Xerosis cutis.  She does not have any pertinent problems on file.  She  has a past surgical history that includes Thyroid surgery; Cosmetic surgery; Colonoscopy; Esophagogastroduodenoscopy; d & c hysteroscopy (N/A, 9/28/2021); and Total abdominal hysterectomy.  Her family history includes Alcohol abuse in her father; Breast cancer in her maternal aunt; Hypertension in her mother; Other in her mother; Stroke in her mother.  She  reports that she has been smoking cigarettes. She has a 30.00 pack-year smoking history. She has never used smokeless tobacco. She reports current alcohol use. She reports that she does not use drugs.  Current Outpatient Medications   Medication Sig Dispense Refill   • azelastine (ASTELIN) 0.1 % nasal spray azelastine 137 mcg (0.1 %) nasal spray aerosol   1 spray each nostril BID     • busPIRone (BUSPAR) 30 MG tablet Take 1 tablet by mouth 2 (Two) Times a Day. 180 tablet 3   • co-enzyme Q-10 30 MG capsule Take 150 mg by mouth Daily.     • DULoxetine HCl 40 MG capsule  delayed-release particles TAKE 1 CAPSULE DAILY (Patient taking differently: Take 1 capsule by mouth Daily.) 90 capsule 3   • ezetimibe (ZETIA) 10 MG tablet Take 1 tablet by mouth Daily. 90 tablet 3   • ibuprofen (ADVIL,MOTRIN) 600 MG tablet Take 1 tablet by mouth Every 6 (Six) Hours As Needed for Mild Pain . 20 tablet 0   • levocetirizine (XYZAL) 5 MG tablet      • levothyroxine (Synthroid) 125 MCG tablet Take 1 tablet by mouth Every Other Day. Alternating with 137mcg. 45 tablet 3   • levothyroxine (SYNTHROID, LEVOTHROID) 137 MCG tablet Take 1 tablet by mouth Daily. 30 tablet 1   • Multiple Minerals-Vitamins (CALCIUM & VIT D3 BONE HEALTH PO) Take  by mouth.     • Multiple Vitamin (MULTIVITAMIN) capsule Take 1 capsule by mouth daily.     • Omega-3 Fatty Acids (fish oil) 1000 MG capsule capsule Take 1,000 mg by mouth Daily.     • predniSONE (DELTASONE) 10 MG tablet Start 9/5/2022: 6-day taper:6,5,4,3,2,1 21 tablet 0   • promethazine-dextromethorphan (PROMETHAZINE-DM) 6.25-15 MG/5ML syrup 1 to 2 teaspoons p.o. nightly as needed cough, insomnia. 120 mL 0   • TURMERIC CURCUMIN PO Take  by mouth.       No current facility-administered medications for this visit.     Current Outpatient Medications on File Prior to Visit   Medication Sig   • azelastine (ASTELIN) 0.1 % nasal spray azelastine 137 mcg (0.1 %) nasal spray aerosol   1 spray each nostril BID   • busPIRone (BUSPAR) 30 MG tablet Take 1 tablet by mouth 2 (Two) Times a Day.   • co-enzyme Q-10 30 MG capsule Take 150 mg by mouth Daily.   • DULoxetine HCl 40 MG capsule delayed-release particles TAKE 1 CAPSULE DAILY (Patient taking differently: Take 1 capsule by mouth Daily.)   • ezetimibe (ZETIA) 10 MG tablet Take 1 tablet by mouth Daily.   • ibuprofen (ADVIL,MOTRIN) 600 MG tablet Take 1 tablet by mouth Every 6 (Six) Hours As Needed for Mild Pain .   • levocetirizine (XYZAL) 5 MG tablet    • levothyroxine (Synthroid) 125 MCG tablet Take 1 tablet by mouth Every Other  Day. Alternating with 137mcg.   • levothyroxine (SYNTHROID, LEVOTHROID) 137 MCG tablet Take 1 tablet by mouth Daily.   • Multiple Minerals-Vitamins (CALCIUM & VIT D3 BONE HEALTH PO) Take  by mouth.   • Multiple Vitamin (MULTIVITAMIN) capsule Take 1 capsule by mouth daily.   • Omega-3 Fatty Acids (fish oil) 1000 MG capsule capsule Take 1,000 mg by mouth Daily.   • predniSONE (DELTASONE) 10 MG tablet Start 9/5/2022: 6-day taper:6,5,4,3,2,1   • promethazine-dextromethorphan (PROMETHAZINE-DM) 6.25-15 MG/5ML syrup 1 to 2 teaspoons p.o. nightly as needed cough, insomnia.   • TURMERIC CURCUMIN PO Take  by mouth.     No current facility-administered medications on file prior to visit.     She is allergic to crestor [rosuvastatin], lipitor [atorvastatin], and livalo [pitavastatin].    Social History    Tobacco Use      Smoking status: Every Day        Packs/day: 0.50        Years: 60.00        Pack years: 30        Types: Cigarettes      Smokeless tobacco: Never    Review of Systems  Consitutional POS: nothing reported    NEG: anorexia or night sweats   Gastointestinal POS: nothing reported    NEG: bloating, change in bowel habits, melena or reflux symptoms   Genitourinary POS: nothing reported    NEG: dysuria or hematuria   Integument POS: nothing reported    NEG: moles that are changing in size, shape, color or rashes   Breast POS: nothing reported    NEG: persistent breast lump, skin dimpling or nipple discharge         Respiratory: negative  Cardiovascular: negative          Objective   /62   Wt 68.6 kg (151 lb 3.2 oz)   BMI 23.68 kg/m²     General:  well developed; well nourished  no acute distress   Skin:  No suspicious lesions seen   Thyroid: normal to inspection and palpation   Lungs:  breathing is unlabored   Heart:  Not performed.   Breasts:  Not performed.   Abdomen: soft, non-tender; no masses  no umbilical or inguinal hernias are present  no hepato-splenomegaly   Pelvis: Not performed.     Psychiatric:  Alert and oriented ×3, mood and affect appropriate  HEENT: Atraumatic, normocephalic, normal scleral icterus  Extremities: 2+ pulses bilaterally, no edema      Lab Review   Surgical Pathology Exam (11/08/2021 13:05)      Imaging   SCANNED - MAMMO (09/19/2022)         Assessment   1. Severe menopausal symptoms     Plan   1. Estradiol patch 0.0375mg patch filled  2. R/B A    No orders of the defined types were placed in this encounter.         This note was electronically signed.      November 8, 2022

## 2022-12-19 DIAGNOSIS — E03.9 ACQUIRED HYPOTHYROIDISM: ICD-10-CM

## 2022-12-19 RX ORDER — LEVOTHYROXINE SODIUM 137 UG/1
TABLET ORAL
Qty: 90 TABLET | Refills: 1 | Status: SHIPPED | OUTPATIENT
Start: 2022-12-19

## 2023-02-24 ENCOUNTER — PATIENT OUTREACH (OUTPATIENT)
Dept: ONCOLOGY | Facility: HOSPITAL | Age: 78
End: 2023-02-24
Payer: MEDICARE

## 2023-05-02 ENCOUNTER — TELEPHONE (OUTPATIENT)
Dept: INTERNAL MEDICINE | Facility: CLINIC | Age: 78
End: 2023-05-02
Payer: MEDICARE

## 2023-05-02 RX ORDER — LEVOTHYROXINE SODIUM 0.12 MG/1
125 TABLET ORAL DAILY
Qty: 90 TABLET | Refills: 1 | Status: SHIPPED | OUTPATIENT
Start: 2023-05-02

## 2023-05-02 RX ORDER — LEVOTHYROXINE SODIUM 0.12 MG/1
125 TABLET ORAL DAILY
Qty: 90 TABLET | Refills: 1 | Status: SHIPPED | OUTPATIENT
Start: 2023-05-02 | End: 2023-05-02 | Stop reason: SDUPTHER

## 2023-05-02 NOTE — TELEPHONE ENCOUNTER
"Caller: Daxa Reyes \"BRIDGET\"    Relationship: Self    Best call back number:     [phone]     What is the best time to reach you:    ANY TIME    Who are you requesting to speak with (clinical staff, provider,  specific staff member):     DR KABA OR NURSE    What was the call regarding:     PATIENT REQUESTED A CALL BACK TO DISCUSS OUTCOME FROM BLOOD WORK COMPLETED AT  ON 3/7/23 TO CHECK PATIENT'S THYROID    PATIENT STATED THE RESULTS SHOWED SHE HAD HYPOTHYROIDISM    PATIENT STATED SHE IS CONCERNED WITH THYROID MEDICATIONS CURRENTLY PRESCRIBED BY DR KABA    PATIENT ALSO REQUESTED CONFIRMATION FROM DR KABA IF SHE SHOULD COMPLETE BLOOD WORK TO CHECK THYROID PRIOR TO NEXT APPOINTMENT ON 5/9/23 AT 3:45    Do you require a callback:     YES  "

## 2023-05-02 NOTE — TELEPHONE ENCOUNTER
I was able to review her labs from  and free T4 is elevated again.  TSH is suppressed.  This means that she is getting too much thyroid medication and she needs to decrease the dosage.  She has been alternating 125mcg with 137mg every other day.  I would advise to drop to 125mcg daily.  I have sent in a new prescription.  With her follow up being a few days from now, I do not believe that is long enough to allow her thyroid function to improve with the medication change. If she had called after the labs were drawn a couple of months ago, a change to medications could have been made at that time.  If she has labs ordered by another provider on something that I am managing, I have no way of knowing about these labs unless she relays this information.

## 2023-05-09 ENCOUNTER — OFFICE VISIT (OUTPATIENT)
Dept: INTERNAL MEDICINE | Facility: CLINIC | Age: 78
End: 2023-05-09
Payer: MEDICARE

## 2023-05-09 VITALS
DIASTOLIC BLOOD PRESSURE: 78 MMHG | WEIGHT: 146 LBS | HEIGHT: 67 IN | TEMPERATURE: 98 F | SYSTOLIC BLOOD PRESSURE: 118 MMHG | OXYGEN SATURATION: 97 % | HEART RATE: 74 BPM | BODY MASS INDEX: 22.91 KG/M2

## 2023-05-09 DIAGNOSIS — Z00.00 MEDICARE ANNUAL WELLNESS VISIT, SUBSEQUENT: Primary | ICD-10-CM

## 2023-05-09 DIAGNOSIS — Z87.891 PERSONAL HISTORY OF NICOTINE DEPENDENCE: ICD-10-CM

## 2023-05-09 DIAGNOSIS — E03.9 HYPOTHYROIDISM (ACQUIRED): ICD-10-CM

## 2023-05-09 DIAGNOSIS — F17.210 CIGARETTE NICOTINE DEPENDENCE WITHOUT COMPLICATION: ICD-10-CM

## 2023-05-09 DIAGNOSIS — Z78.0 POSTMENOPAUSAL: ICD-10-CM

## 2023-05-09 PROCEDURE — G0439 PPPS, SUBSEQ VISIT: HCPCS | Performed by: FAMILY MEDICINE

## 2023-05-09 PROCEDURE — 1159F MED LIST DOCD IN RCRD: CPT | Performed by: FAMILY MEDICINE

## 2023-05-09 PROCEDURE — 1160F RVW MEDS BY RX/DR IN RCRD: CPT | Performed by: FAMILY MEDICINE

## 2023-05-09 PROCEDURE — 99397 PER PM REEVAL EST PAT 65+ YR: CPT | Performed by: FAMILY MEDICINE

## 2023-05-09 RX ORDER — VENLAFAXINE HYDROCHLORIDE 75 MG/1
75 CAPSULE, EXTENDED RELEASE ORAL DAILY
Qty: 90 CAPSULE | Refills: 3 | Status: SHIPPED | OUTPATIENT
Start: 2023-05-09

## 2023-05-09 RX ORDER — CEFDINIR 300 MG/1
300 CAPSULE ORAL 2 TIMES DAILY
Qty: 20 CAPSULE | Refills: 0 | Status: SHIPPED | OUTPATIENT
Start: 2023-05-09 | End: 2023-05-15 | Stop reason: SDUPTHER

## 2023-05-09 RX ORDER — LEVOTHYROXINE SODIUM 0.12 MG/1
125 TABLET ORAL DAILY
Qty: 90 TABLET | Refills: 1 | Status: CANCELLED | OUTPATIENT
Start: 2023-05-09

## 2023-05-09 RX ORDER — LEVOTHYROXINE SODIUM 0.12 MG/1
125 TABLET ORAL DAILY
Qty: 90 TABLET | Refills: 1 | Status: SHIPPED | OUTPATIENT
Start: 2023-05-09 | End: 2023-05-10 | Stop reason: SDUPTHER

## 2023-05-09 RX ORDER — VENLAFAXINE HYDROCHLORIDE 75 MG/1
75 CAPSULE, EXTENDED RELEASE ORAL DAILY
COMMUNITY
Start: 2023-03-07 | End: 2023-05-09 | Stop reason: SDUPTHER

## 2023-05-09 NOTE — PATIENT INSTRUCTIONS
Advance Directive  Advance directives are legal documents that allow you to make decisions about your health care and medical treatment in case you become unable to communicate for yourself. Advance directives let your wishes be known to family, friends, and health care providers.  Discussing and writing advance directives should happen over time rather than all at once. Advance directives can be changed and updated at any time. There are different types of advance directives, such as:  Medical power of .  Living will.  Do not resuscitate (DNR) order or do not attempt resuscitation (DNAR) order.  Health care proxy and medical power of   A health care proxy is also called a health care agent. This person is appointed to make medical decisions for you when you are unable to make decisions for yourself. Generally, people ask a trusted friend or family member to act as their proxy and represent their preferences. Make sure you have an agreement with your trusted person to act as your proxy. A proxy may have to make a medical decision on your behalf if your wishes are not known.  A medical power of , also called a durable power of  for health care, is a legal document that names your health care proxy. Depending on the laws in your state, the document may need to be:  Signed.  Notarized.  Dated.  Copied.  Witnessed.  Incorporated into your medical record.  You may also want to appoint a trusted person to manage your money in the event you are unable to do so. This is called a durable power of  for finances. It is a separate legal document from the durable power of  for health care. You may choose your health care proxy or someone different to act as your agent in money matters.  If you do not appoint a proxy, or there is a concern that the proxy is not acting in your best interest, a court may appoint a guardian to act on your behalf.  Living will  A living will is a set of  instructions that state your wishes about medical care when you cannot express them yourself. Health care providers should keep a copy of your living will in your medical record. You may want to give a copy to family members or friends. To alert caregivers in case of an emergency, you can place a card in your wallet to let them know that you have a living will and where they can find it. A living will is used if you become:  Terminally ill.  Disabled.  Unable to communicate or make decisions.  The following decisions should be included in your living will:  To use or not to use life support equipment, such as dialysis machines and breathing machines (ventilators).  Whether you want a DNR or DNAR order. This tells health care providers not to use cardiopulmonary resuscitation (CPR) if breathing or heartbeat stops.  To use or not to use tube feeding.  To be given or not to be given food and fluids.  Whether you want comfort (palliative) care when the goal becomes comfort rather than a cure.  Whether you want to donate your organs and tissues.  A living will does not give instructions for distributing your money and property if you should pass away.  DNR or DNAR  A DNR or DNAR order is a request not to have CPR in the event that your heart stops beating or you stop breathing. If a DNR or DNAR order has not been made and shared, a health care provider will try to help any patient whose heart has stopped or who has stopped breathing. If you plan to have surgery, talk with your health care provider about how your DNR or DNAR order will be followed if problems occur.  What if I do not have an advance directive?  Some states assign family decision makers to act on your behalf if you do not have an advance directive. Each state has its own laws about advance directives. You may want to check with your health care provider, , or state representative about the laws in your state.  Summary  Advance directives are legal  documents that allow you to make decisions about your health care and medical treatment in case you become unable to communicate for yourself.  The process of discussing and writing advance directives should happen over time. You can change and update advance directives at any time.  Advance directives may include a medical power of , a living will, and a DNR or DNAR order.  This information is not intended to replace advice given to you by your health care provider. Make sure you discuss any questions you have with your health care provider.  Document Revised: 2021 Document Reviewed: 2021  Elsevier Patient Education ©  [employer]vier Inc[employer]    Medicare Wellness  Personal Prevention Plan of Service     Date of Office Visit:    Encounter Provider:  Haritha Del Toro DO  Place of Service:  [facility]  Patient Name: Daxa Reyes  :  1945    As part of the Medicare Wellness portion of your visit today, we are providing you with this personalized preventive plan of services (PPPS). This plan is based upon recommendations of the [place] Preventive Services Task Force (USPSTF) and the Advisory Committee on Immunization Practices (ACIP).    This lists the preventive care services that should be considered, and provides dates of when you are due. Items listed as completed are up-to-date and do not require any further intervention.    Health Maintenance   Topic Date Due    TDAP/TD VACCINES (2 - Td or Tdap) 10/09/2022    COLORECTAL CANCER SCREENING  2022    ANNUAL WELLNESS VISIT  02/15/2023    DXA SCAN  2023    COVID-19 Vaccine (4 - Booster for Moderna series) 2023 (Originally 2021)    INFLUENZA VACCINE  2023    LIPID PANEL  2023    MAMMOGRAM  2023    Pneumococcal Vaccine 65+  Completed    ZOSTER VACCINE  Completed    HEPATITIS C SCREENING  Discontinued       No orders of the defined types were placed in this  encounter.      No follow-ups on file.

## 2023-05-09 NOTE — PROGRESS NOTES
The ABCs of the Annual Wellness Visit  Subsequent Medicare Wellness Visit    Britta Reyes is a 77 y.o. female who presents for a Subsequent Medicare Wellness Visit and preventative.    The following portions of the patient's history were reviewed and   updated as appropriate: allergies, current medications, past family history, past medical history, past social history, past surgical history and problem list.    Compared to one year ago, the patient feels her physical   health is worse due to hyperactive thyroid and nasal congestion.    Compared to one year ago, the patient feels her mental   health is worse due to physical.    Recent Hospitalizations:  She was not admitted to the hospital during the last year.       Current Medical Providers:  Patient Care Team:  Haritha Del Toro DO as PCP - General (Family Medicine)    Outpatient Medications Prior to Visit   Medication Sig Dispense Refill   • azelastine (ASTELIN) 0.1 % nasal spray azelastine 137 mcg (0.1 %) nasal spray aerosol   1 spray each nostril BID     • busPIRone (BUSPAR) 30 MG tablet Take 1 tablet by mouth 2 (Two) Times a Day. 180 tablet 3   • co-enzyme Q-10 30 MG capsule Take 5 capsules by mouth Daily.     • estradiol (CLIMARA) 0.0375 MG/24HR Place 1 patch on the skin as directed by provider 1 (One) Time Per Week. 4 each 12   • ezetimibe (ZETIA) 10 MG tablet Take 1 tablet by mouth Daily. 90 tablet 3   • ibuprofen (ADVIL,MOTRIN) 600 MG tablet Take 1 tablet by mouth Every 6 (Six) Hours As Needed for Mild Pain . 20 tablet 0   • levocetirizine (XYZAL) 5 MG tablet      • Multiple Minerals-Vitamins (CALCIUM & VIT D3 BONE HEALTH PO) Take  by mouth.     • Multiple Vitamin (MULTIVITAMIN) capsule Take 1 capsule by mouth Daily.     • Omega-3 Fatty Acids (fish oil) 1000 MG capsule capsule Take 1 capsule by mouth Daily.     • promethazine-dextromethorphan (PROMETHAZINE-DM) 6.25-15 MG/5ML syrup 1 to 2 teaspoons p.o. nightly as needed cough,  "insomnia. 120 mL 0   • TURMERIC CURCUMIN PO Take  by mouth.     • venlafaxine XR (EFFEXOR-XR) 75 MG 24 hr capsule Take 1 capsule by mouth Daily.     • amoxicillin-clavulanate (Augmentin) 875-125 MG per tablet Take 1 tablet by mouth Every 12 (Twelve) Hours. 20 tablet 0   • DULoxetine HCl 40 MG capsule delayed-release particles TAKE 1 CAPSULE DAILY (Patient taking differently: Take 1 capsule by mouth Daily.) 90 capsule 3   • levothyroxine (Synthroid) 125 MCG tablet Take 1 tablet by mouth Daily. Alternating with 137mcg. 90 tablet 1     No facility-administered medications prior to visit.       No opioid medication identified on active medication list. I have reviewed chart for other potential  high risk medication/s and harmful drug interactions in the elderly.          Aspirin is not on active medication list.  Aspirin use is not indicated based on review of current medical condition/s. Risk of harm outweighs potential benefits.  .    Patient Active Problem List   Diagnosis   • Centrilobular emphysema   • Hypothyroidism   • Vitamin D deficiency   • Dyslipidemia   • ANA LAURA (obstructive sleep apnea)   • Vertigo   • History of arthritis   • History of osteoarthritis   • Renal disorder   • Xerosis cutis   • Anxiety   • Depressed   • Numbness in feet   • Varicose veins of both lower extremities   • Environmental allergies   • Medicare annual wellness visit, subsequent   • Complex atypical endometrial hyperplasia   • Urinary frequency   • Memory changes     Advance Care Planning   Advance Care Planning     Advance Directive is not on file.  ACP discussion was held with the patient during this visit. Patient has an advance directive (not in EMR), copy requested.     Objective    Vitals:    05/09/23 1553   BP: 118/78   BP Location: Right arm   Patient Position: Sitting   Cuff Size: Adult   Pulse: 74   Temp: 98 °F (36.7 °C)   SpO2: 97%   Weight: 66.2 kg (146 lb)   Height: 170.2 cm (67\")   PainSc: 0-No pain     Estimated body mass " "index is 22.87 kg/m² as calculated from the following:    Height as of this encounter: 170.2 cm (67\").    Weight as of this encounter: 66.2 kg (146 lb).    BMI is within normal parameters. No other follow-up for BMI required.      Does the patient have evidence of cognitive impairment?   No            HEALTH RISK ASSESSMENT    Smoking Status:  Social History     Tobacco Use   Smoking Status Every Day   • Packs/day: 0.50   • Years: 60.00   • Pack years: 30.00   • Types: Cigarettes   Smokeless Tobacco Never     Alcohol Consumption:  Social History     Substance and Sexual Activity   Alcohol Use Yes    Comment: occasional     Fall Risk Screen:    KEN Fall Risk Assessment was completed, and patient is at LOW risk for falls.Assessment completed on:2023    Depression Screenin/9/2023     3:59 PM   PHQ-2/PHQ-9 Depression Screening   Little Interest or Pleasure in Doing Things 0-->not at all   Feeling Down, Depressed or Hopeless 1-->several days   PHQ-9: Brief Depression Severity Measure Score 1       Health Habits and Functional and Cognitive Screenin/9/2023     4:00 PM   Functional & Cognitive Status   Do you have difficulty preparing food and eating? No   Do you have difficulty bathing yourself, getting dressed or grooming yourself? No   Do you have difficulty using the toilet? No   Do you have difficulty moving around from place to place? No   Do you have trouble with steps or getting out of a bed or a chair? No   Current Diet Well Balanced Diet   Dental Exam Up to date   Eye Exam Up to date   Exercise (times per week) 7 times per week   Current Exercises Include Walking;Home Fitness Gym   Do you need help using the phone?  No   Are you deaf or do you have serious difficulty hearing?  No   Do you need help with transportation? No   Do you need help shopping? No   Do you need help preparing meals?  No   Do you need help with housework?  No   Do you need help with laundry? No   Do you need help " taking your medications? No   Do you need help managing money? No   Do you ever drive or ride in a car without wearing a seat belt? No   Have you felt unusual stress, anger or loneliness in the last month? No   Who do you live with? Spouse   If you need help, do you have trouble finding someone available to you? No   Have you been bothered in the last four weeks by sexual problems? No   Do you have difficulty concentrating, remembering or making decisions? No       Age-appropriate Screening Schedule:  Refer to the list below for future screening recommendations based on patient's age, sex and/or medical conditions. Orders for these recommended tests are listed in the plan section. The patient has been provided with a written plan.    Health Maintenance   Topic Date Due   • COVID-19 Vaccine (4 - Booster for Moderna series) 12/16/2021   • TDAP/TD VACCINES (2 - Td or Tdap) 10/09/2022   • COLORECTAL CANCER SCREENING  11/26/2022   • ANNUAL WELLNESS VISIT  02/15/2023   • DXA SCAN  02/23/2023   • INFLUENZA VACCINE  08/01/2023   • LIPID PANEL  08/17/2023   • MAMMOGRAM  09/19/2023   • Pneumococcal Vaccine 65+  Completed   • ZOSTER VACCINE  Completed   • HEPATITIS C SCREENING  Discontinued                  CMS Preventative Services Quick Reference  Risk Factors Identified During Encounter:    Immunizations Discussed/Encouraged: Tdap and COVID19  Tobacco Use/Dependance Risk  The above risks/problems have been discussed with the patient.  Pertinent information has been shared with the patient in the After Visit Summary.    The following portions of the patient's history were reviewed and updated as appropriate: allergies, current medications, past family history, past medical history, past social history, past surgical history and problem list.     Allergies   Allergen Reactions   • Crestor [Rosuvastatin] Myalgia   • Lipitor [Atorvastatin] Myalgia   • Livalo [Pitavastatin] Dizziness         Current Outpatient Medications:   •   azelastine (ASTELIN) 0.1 % nasal spray, azelastine 137 mcg (0.1 %) nasal spray aerosol  1 spray each nostril BID, Disp: , Rfl:   •  busPIRone (BUSPAR) 30 MG tablet, Take 1 tablet by mouth 2 (Two) Times a Day., Disp: 180 tablet, Rfl: 3  •  co-enzyme Q-10 30 MG capsule, Take 5 capsules by mouth Daily., Disp: , Rfl:   •  estradiol (CLIMARA) 0.0375 MG/24HR, Place 1 patch on the skin as directed by provider 1 (One) Time Per Week., Disp: 4 each, Rfl: 12  •  ezetimibe (ZETIA) 10 MG tablet, Take 1 tablet by mouth Daily., Disp: 90 tablet, Rfl: 3  •  ibuprofen (ADVIL,MOTRIN) 600 MG tablet, Take 1 tablet by mouth Every 6 (Six) Hours As Needed for Mild Pain ., Disp: 20 tablet, Rfl: 0  •  levocetirizine (XYZAL) 5 MG tablet, , Disp: , Rfl:   •  Multiple Minerals-Vitamins (CALCIUM & VIT D3 BONE HEALTH PO), Take  by mouth., Disp: , Rfl:   •  Multiple Vitamin (MULTIVITAMIN) capsule, Take 1 capsule by mouth Daily., Disp: , Rfl:   •  Omega-3 Fatty Acids (fish oil) 1000 MG capsule capsule, Take 1 capsule by mouth Daily., Disp: , Rfl:   •  promethazine-dextromethorphan (PROMETHAZINE-DM) 6.25-15 MG/5ML syrup, 1 to 2 teaspoons p.o. nightly as needed cough, insomnia., Disp: 120 mL, Rfl: 0  •  TURMERIC CURCUMIN PO, Take  by mouth., Disp: , Rfl:   •  venlafaxine XR (EFFEXOR-XR) 75 MG 24 hr capsule, Take 1 capsule by mouth Daily., Disp: 90 capsule, Rfl: 3  •  cefdinir (OMNICEF) 300 MG capsule, Take 1 capsule by mouth 2 (Two) Times a Day., Disp: 20 capsule, Rfl: 0  •  levothyroxine (Synthroid) 125 MCG tablet, Take 1 tablet by mouth Daily., Disp: 90 tablet, Rfl: 1    ROS    Review of Systems   Constitutional: Negative for chills and fever.   HENT: Positive for congestion.    Respiratory: Negative for cough and shortness of breath.    Cardiovascular: Negative for chest pain.   Gastrointestinal: Negative for constipation, diarrhea, nausea and vomiting.       Vitals:    05/09/23 1553   BP: 118/78   BP Location: Right arm   Patient Position:  "Sitting   Cuff Size: Adult   Pulse: 74   Temp: 98 °F (36.7 °C)   SpO2: 97%   Weight: 66.2 kg (146 lb)   Height: 170.2 cm (67\")   PainSc: 0-No pain     Body mass index is 22.87 kg/m².    Physical Exam     Physical Exam  Constitutional:       General: She is not in acute distress.     Appearance: Normal appearance. She is well-developed.   HENT:      Head: Normocephalic and atraumatic.      Right Ear: External ear normal.      Left Ear: External ear normal.   Eyes:      Extraocular Movements: Extraocular movements intact.      Conjunctiva/sclera: Conjunctivae normal.   Neck:      Vascular: No carotid bruit.   Cardiovascular:      Rate and Rhythm: Normal rate and regular rhythm.      Heart sounds: No murmur heard.  Pulmonary:      Effort: Pulmonary effort is normal. No respiratory distress.      Breath sounds: Normal breath sounds. No wheezing.   Abdominal:      General: Bowel sounds are normal. There is no distension.      Palpations: Abdomen is soft.      Tenderness: There is no abdominal tenderness.   Musculoskeletal:      Cervical back: Neck supple.      Right lower leg: No edema.      Left lower leg: No edema.   Lymphadenopathy:      Cervical: No cervical adenopathy.   Skin:     General: Skin is warm and dry.   Neurological:      Mental Status: She is alert and oriented to person, place, and time.      Cranial Nerves: No cranial nerve deficit.      Deep Tendon Reflexes: Reflexes normal.   Psychiatric:         Mood and Affect: Mood normal.         Behavior: Behavior normal.         Assessment/Plan    Diagnoses and all orders for this visit:    1. Medicare annual wellness visit, subsequent (Primary)    2. Hypothyroidism (acquired)    3. Personal history of nicotine dependence  -      CT Chest Low Dose Cancer Screening WO    4. Postmenopausal  -     DEXA Bone Density Axial    5. Cigarette nicotine dependence without complication  -      CT Chest Low Dose Cancer Screening WO    Other orders  -     Discontinue: " levothyroxine (Synthroid) 125 MCG tablet; Take 1 tablet by mouth Daily. Alternating with 137mcg.  Dispense: 90 tablet; Refill: 1  -     venlafaxine XR (EFFEXOR-XR) 75 MG 24 hr capsule; Take 1 capsule by mouth Daily.  Dispense: 90 capsule; Refill: 3  -     cefdinir (OMNICEF) 300 MG capsule; Take 1 capsule by mouth 2 (Two) Times a Day.  Dispense: 20 capsule; Refill: 0        New Medications Ordered This Visit   Medications   • venlafaxine XR (EFFEXOR-XR) 75 MG 24 hr capsule     Sig: Take 1 capsule by mouth Daily.     Dispense:  90 capsule     Refill:  3   • cefdinir (OMNICEF) 300 MG capsule     Sig: Take 1 capsule by mouth 2 (Two) Times a Day.     Dispense:  20 capsule     Refill:  0     Discussed with patient routine health maintenance including:  Vaccines, dental/eye health, healthy diet and exercise, DEXA, lung cancer screening, mammogram.  Mental health addressed today as well.  She is not due for labs are this time.  Thyroid medication recently decreased due to overactivity.  Encouraged to receive tdap at local pharmacy if it has been more than 10 years since last Tdap.      No orders of the defined types were placed in this encounter.      Return in about 6 months (around 11/9/2023).    Haritha Del Toro,   An After Visit Summary and PPPS were made available to the patient.

## 2023-05-09 NOTE — TELEPHONE ENCOUNTER
Caller: [facility] - [place], [place] - [address] [place] [address] [phone] [facility] [phone]     Relationship: Pharmacy    Best call back number: [phone]    Requested Prescriptions:   Requested Prescriptions     Pending Prescriptions Disp Refills   • levothyroxine (Synthroid) 125 MCG tablet 90 tablet 1     Sig: Take 1 tablet by mouth Daily. Alternating with 137mcg.        Pharmacy where request should be sent:      Last office visit with prescribing clinician: 8/15/2022   Last telemedicine visit with prescribing clinician: 5/2/2023   Next office visit with prescribing clinician: 5/9/2023     Additional details provided by patient: [facility] IS CALLING FOR DOSING INSTRUCTION,, DIRECTIONS DO NOT MATCH QUANTITY IF INTENDED FOR A 90 DAY SUPPLY.  THIS IS A FINAL ATTEMPT TO CLARIFY, IF NO RESPONSE THIS SCRIPT WILL CANCEL.    Does the patient have less than a 3 day supply:  [x] Yes  [] No    Would you like a call back once the refill request has been completed: [] Yes [] No    If the office needs to give you a call back, can they leave a voicemail: [] Yes [] No    Josh Redmond Rep   05/09/23 12:26 EDT

## 2023-05-10 RX ORDER — LEVOTHYROXINE SODIUM 0.12 MG/1
125 TABLET ORAL DAILY
Qty: 90 TABLET | Refills: 1 | Status: SHIPPED | OUTPATIENT
Start: 2023-05-10 | End: 2023-05-11

## 2023-05-10 NOTE — TELEPHONE ENCOUNTER
"    Caller: Daxa Reyes \"BRIDGET\"    Relationship: Self    Best call back number: [phone]    Requested Prescriptions:   Requested Prescriptions     Pending Prescriptions Disp Refills   • levothyroxine (Synthroid) 125 MCG tablet 90 tablet 1     Sig: Take 1 tablet by mouth Daily. Alternating with 137mcg.        Pharmacy where request should be sent: [facility] [address] [phone] [facility] [phone]      Last office visit with prescribing clinician: 5/9/2023   Last telemedicine visit with prescribing clinician: 5/9/2023   Next office visit with prescribing clinician: 11/13/2023     Additional details provided by patient: PATIENT ADVISED TO CALL [facility] [phone]  PHARMACY ADVISED THAT THEY NEED DIRECTIONS FOR THE PRESCRIPTION   PATIENT WOULD LIKE A PHONE CALL WHEN THIS HAS BEEN DONE   Does the patient have less than a 3 day supply:  [x] Yes  [] No    Would you like a call back once the refill request has been completed: [x] Yes [] No        Josh Dejesus Rep   05/10/23 16:27 EDT           "

## 2023-05-11 RX ORDER — LEVOTHYROXINE SODIUM 0.12 MG/1
125 TABLET ORAL DAILY
Qty: 90 TABLET | Refills: 1 | Status: SHIPPED | OUTPATIENT
Start: 2023-05-11

## 2023-05-14 ENCOUNTER — TELEPHONE (OUTPATIENT)
Dept: INTERNAL MEDICINE | Facility: CLINIC | Age: 78
End: 2023-05-14
Payer: MEDICARE

## 2023-05-14 PROBLEM — Z00.00 MEDICARE ANNUAL WELLNESS VISIT, SUBSEQUENT: Status: ACTIVE | Noted: 2017-11-09

## 2023-05-15 ENCOUNTER — TELEPHONE (OUTPATIENT)
Dept: INTERNAL MEDICINE | Facility: CLINIC | Age: 78
End: 2023-05-15
Payer: MEDICARE

## 2023-05-15 RX ORDER — CEFDINIR 300 MG/1
300 CAPSULE ORAL 2 TIMES DAILY
Qty: 20 CAPSULE | Refills: 0 | Status: SHIPPED | OUTPATIENT
Start: 2023-05-15

## 2023-05-15 NOTE — TELEPHONE ENCOUNTER
Please notify patient I was able to sign the order for CT of the chest for lung cancer screening.  However, there was a notification that the test may not be covered due to age restrictions.  She has had this test done in the past after age 75.  She may want to contact the insurance company prior to completing the test to clarify coverage.

## 2023-05-15 NOTE — TELEPHONE ENCOUNTER
"Caller: Daxa Reyes \"BRIDGET\"    Relationship: Self    Best call back number: [phone]    What medications are you currently taking:   Current Outpatient Medications on File Prior to Visit   Medication Sig Dispense Refill   • azelastine (ASTELIN) 0.1 % nasal spray azelastine 137 mcg (0.1 %) nasal spray aerosol   1 spray each nostril BID     • busPIRone (BUSPAR) 30 MG tablet Take 1 tablet by mouth 2 (Two) Times a Day. 180 tablet 3   • cefdinir (OMNICEF) 300 MG capsule Take 1 capsule by mouth 2 (Two) Times a Day. 20 capsule 0   • co-enzyme Q-10 30 MG capsule Take 5 capsules by mouth Daily.     • estradiol (CLIMARA) 0.0375 MG/24HR Place 1 patch on the skin as directed by provider 1 (One) Time Per Week. 4 each 12   • ezetimibe (ZETIA) 10 MG tablet Take 1 tablet by mouth Daily. 90 tablet 3   • ibuprofen (ADVIL,MOTRIN) 600 MG tablet Take 1 tablet by mouth Every 6 (Six) Hours As Needed for Mild Pain . 20 tablet 0   • levocetirizine (XYZAL) 5 MG tablet      • levothyroxine (Synthroid) 125 MCG tablet Take 1 tablet by mouth Daily. 90 tablet 1   • Multiple Minerals-Vitamins (CALCIUM & VIT D3 BONE HEALTH PO) Take  by mouth.     • Multiple Vitamin (MULTIVITAMIN) capsule Take 1 capsule by mouth Daily.     • Omega-3 Fatty Acids (fish oil) 1000 MG capsule capsule Take 1 capsule by mouth Daily.     • promethazine-dextromethorphan (PROMETHAZINE-DM) 6.25-15 MG/5ML syrup 1 to 2 teaspoons p.o. nightly as needed cough, insomnia. 120 mL 0   • TURMERIC CURCUMIN PO Take  by mouth.     • venlafaxine XR (EFFEXOR-XR) 75 MG 24 hr capsule Take 1 capsule by mouth Daily. 90 capsule 3     No current facility-administered medications on file prior to visit.      Which medication are you concerned about:     ANTIBIOTICS     What are your concerns:     PATIENT RECIEVED EFFEXOR BUT DID NOT RECEIVE CEFDINIR.  PATIENT BELIEVES [facility] DOES NOT FILL ANTIBIOTICS.  PLEASE SEND TO [facility]             "

## 2023-05-22 ENCOUNTER — APPOINTMENT (OUTPATIENT)
Dept: BONE DENSITY | Facility: HOSPITAL | Age: 78
End: 2023-05-22
Payer: MEDICARE

## 2023-05-22 PROCEDURE — 77080 DXA BONE DENSITY AXIAL: CPT

## 2023-06-07 ENCOUNTER — TELEPHONE (OUTPATIENT)
Dept: INTERNAL MEDICINE | Facility: CLINIC | Age: 78
End: 2023-06-07

## 2023-06-07 RX ORDER — AMOXICILLIN AND CLAVULANATE POTASSIUM 875; 125 MG/1; MG/1
1 TABLET, FILM COATED ORAL 2 TIMES DAILY
Qty: 20 TABLET | Refills: 0 | OUTPATIENT
Start: 2023-06-07 | End: 2023-08-28

## 2023-06-07 NOTE — TELEPHONE ENCOUNTER
"Caller: Daxa Reyes \"BRIDGET\"    Relationship: Self    Best call back number:     [phone]     What medication are you requesting:     PATIENT REQUESTED ANOTHER ROUND OF ANTIBIOTIC PRESCRIBED BY DR KABA SINCE SYMPTOMS HAVE WORSENED    PATIENT DID NOT RECALL THE NAME OF THE ANTIBIOTIC    If a prescription is needed, what is your preferred pharmacy and phone number:      [facility] - [place], [place]    TELEPHONE CONTACT:    [phone]          "

## 2023-06-07 NOTE — TELEPHONE ENCOUNTER
Patient notified of rx sent in. Advised to schedule with an ENT,patient states that the ENT didn't do anything for her either.

## 2023-08-31 DIAGNOSIS — N39.0 ACUTE URINARY TRACT INFECTION: Primary | ICD-10-CM

## 2023-08-31 RX ORDER — CIPROFLOXACIN 500 MG/1
500 TABLET, FILM COATED ORAL 2 TIMES DAILY
Qty: 14 TABLET | Refills: 0 | Status: SHIPPED | OUTPATIENT
Start: 2023-08-31 | End: 2023-09-07

## 2023-09-05 ENCOUNTER — TELEPHONE (OUTPATIENT)
Dept: INTERNAL MEDICINE | Facility: CLINIC | Age: 78
End: 2023-09-05

## 2023-09-05 DIAGNOSIS — E03.9 ACQUIRED HYPOTHYROIDISM: Primary | ICD-10-CM

## 2023-09-05 NOTE — TELEPHONE ENCOUNTER
Please make sure patient is taking 100mcg that was sent in as 137mcg of levothyroxine is currently in her chart.

## 2023-09-05 NOTE — TELEPHONE ENCOUNTER
PATIENT WANTS TO SEND THIS REFILL REQUEST THROUGH [facility], PLEASE DO NOT SEND TO [facility]ARM[facility] PER HER REQUEST. THANK YOU!

## 2023-09-13 ENCOUNTER — TELEPHONE (OUTPATIENT)
Dept: INTERNAL MEDICINE | Facility: CLINIC | Age: 78
End: 2023-09-13
Payer: MEDICARE

## 2023-09-13 DIAGNOSIS — E78.5 DYSLIPIDEMIA: ICD-10-CM

## 2023-09-13 RX ORDER — EZETIMIBE 10 MG/1
TABLET ORAL
Qty: 90 TABLET | Refills: 3 | OUTPATIENT
Start: 2023-09-13

## 2023-09-13 RX ORDER — EZETIMIBE 10 MG/1
10 TABLET ORAL DAILY
Qty: 90 TABLET | Refills: 1 | Status: SHIPPED | OUTPATIENT
Start: 2023-09-13

## 2023-09-13 NOTE — TELEPHONE ENCOUNTER
"Caller: Daxa Reyes \"BRIDGET\"    Relationship: Self    Best call back number:  [phone]    Requested Prescriptions:   Requested Prescriptions      No prescriptions requested or ordered in this encounter      ezetimibe (ZETIA) 10 MG tablet     Pharmacy where request should be sent:      [facility] [address] [phone] [facility] [phone]       Last office visit with prescribing clinician: 5/9/2023   Last telemedicine visit with prescribing clinician: Visit date not found   Next office visit with prescribing clinician: 11/13/2023       Does the patient have less than a 3 day supply:  [x] Yes  [] No    Would you like a call back once the refill request has been completed: [] Yes [x] No    If the office needs to give you a call back, can they leave a voicemail: [] Yes [x] No    Akilah Lara, PCT   09/13/23 12:38 EDT     "

## 2023-09-15 ENCOUNTER — TELEPHONE (OUTPATIENT)
Dept: ENDOCRINOLOGY | Facility: CLINIC | Age: 78
End: 2023-09-15

## 2023-09-15 NOTE — TELEPHONE ENCOUNTER
"    Caller: Daxa Reyes \"BRIDGET\"     Relationship: SELF     Best call back number: [phone]    What is your medical concern? PT HAS A MASSAGE PROCEDURE FOR [place] ON MONDAY, HER APPT IS WED. PT WAS ADV SHE WOULD NOT FEEL WELL FOR A FEW DAYS AFTER MASSAGE AND IS CONCERNED THAT SHE WILL NEED TO [place]. PLEASE CALL AND ADV PT     "

## 2023-10-05 ENCOUNTER — OFFICE VISIT (OUTPATIENT)
Dept: ENDOCRINOLOGY | Facility: CLINIC | Age: 78
End: 2023-10-05
Payer: MEDICARE

## 2023-10-05 VITALS
HEART RATE: 64 BPM | HEIGHT: 67 IN | BODY MASS INDEX: 22.91 KG/M2 | SYSTOLIC BLOOD PRESSURE: 136 MMHG | WEIGHT: 146 LBS | DIASTOLIC BLOOD PRESSURE: 72 MMHG

## 2023-10-05 DIAGNOSIS — F41.9 ANXIETY: ICD-10-CM

## 2023-10-05 DIAGNOSIS — E03.9 ACQUIRED HYPOTHYROIDISM: Primary | ICD-10-CM

## 2023-10-05 LAB
T4 FREE SERPL-MCNC: 1.38 NG/DL (ref 0.93–1.7)
TSH SERPL DL<=0.05 MIU/L-ACNC: 0.01 UIU/ML (ref 0.27–4.2)

## 2023-10-05 PROCEDURE — 84439 ASSAY OF FREE THYROXINE: CPT | Performed by: INTERNAL MEDICINE

## 2023-10-05 PROCEDURE — 84443 ASSAY THYROID STIM HORMONE: CPT | Performed by: INTERNAL MEDICINE

## 2023-10-05 PROCEDURE — 99204 OFFICE O/P NEW MOD 45 MIN: CPT | Performed by: INTERNAL MEDICINE

## 2023-10-05 PROCEDURE — 36415 COLL VENOUS BLD VENIPUNCTURE: CPT | Performed by: INTERNAL MEDICINE

## 2023-10-05 RX ORDER — LEVOTHYROXINE SODIUM 0.1 MG/1
100 TABLET ORAL DAILY
Start: 2023-10-05 | End: 2023-10-07

## 2023-10-05 NOTE — PROGRESS NOTES
Subjective:   Hypothyroidism (Patient is here to establish care for hypothyroidism )        Daxa Reyes is a 77 y.o. female who is being seen for consultation today at the request of Haritha Del Toro DO    Hypothyroidism  - postoperative. Patient had subtotal thyroidectomy years ago and took thyroid medication since then.   She had been on a steady dose of levothyroxine.     Hysterectomy in Nov 2021- she had multiple symptoms after the hysterectomy. Stopped estradiol patch at the same time. She restarted the patch now and doesn't feel better.    Patient complains of fatigue, anxiousness, changes in the appetite, insomnia.  She has recurrent sinus infections and congestions, repeat steroid injections.   She reported anxiety and fear of going to the public place. She also noted some obsessive ideas. The symptoms started about  a year ago and she feels that it is interfering with her daily activities.   She started Effexor with partial response.    She takes levothyroxine on an empty stomach in the morning. Drinks pepsi few minutes after the medication. The breakfast is later.      Current medications:  Current Outpatient Medications   Medication Sig Dispense Refill    azelastine (ASTELIN) 0.1 % nasal spray azelastine 137 mcg (0.1 %) nasal spray aerosol   1 spray each nostril BID      budesonide (PULMICORT) 0.5 MG/2ML nebulizer solution EMPTY CONTENTS OF 1 VIAL INTO NASAL IRRIGATION SYSTEM, ADD DISTILLED WATER, SALT PACK, MIX & IRRIGATE. PERFORM 1-2 TIMES DAILY      co-enzyme Q-10 30 MG capsule Take 5 capsules by mouth Daily.      estradiol (CLIMARA) 0.0375 MG/24HR Place 1 patch on the skin as directed by provider 1 (One) Time Per Week. 4 each 12    ezetimibe (ZETIA) 10 MG tablet Take 1 tablet by mouth Daily. 90 tablet 1    famotidine (PEPCID) 40 MG tablet 1 po daily prn itch 30 tablet 0    fluticasone (FLONASE) 50 MCG/ACT nasal spray Spray 1 spray every day by intranasal route.      Hypertonic Nasal Wash  (Sinus Rinse Refill) pack See Admin Instructions.      ibuprofen (ADVIL,MOTRIN) 600 MG tablet Take 1 tablet by mouth Every 6 (Six) Hours As Needed for Mild Pain . 20 tablet 0    Omega-3 Fatty Acids (fish oil) 1000 MG capsule capsule Take 1 capsule by mouth Daily.      TURMERIC CURCUMIN PO Take  by mouth.      venlafaxine XR (EFFEXOR-XR) 75 MG 24 hr capsule Take 1 capsule by mouth Daily. 90 capsule 3    levothyroxine (Synthroid) 100 MCG tablet Take 1 tablet by mouth Daily.      loratadine (CLARITIN) 10 MG tablet Take 1 tablet by mouth Daily. 30 tablet 0    Multiple Minerals-Vitamins (CALCIUM & VIT D3 BONE HEALTH PO) Take  by mouth.      Multiple Vitamin (MULTIVITAMIN) capsule Take 1 capsule by mouth Daily.      triamcinolone (KENALOG) 0.1 % cream Apply to affected area tid prn itch 45 g 0     No current facility-administered medications for this visit.       Review of Systems  Review of Systems   Constitutional:  Positive for fatigue.   HENT:  Positive for postnasal drip and sinus pressure.    Cardiovascular:  Positive for palpitations.   Endocrine: Negative for heat intolerance.   Psychiatric/Behavioral:  Positive for agitation. The patient is nervous/anxious.      Objective:     Vitals:    10/05/23 1310   BP: 136/72   Pulse: 64   Body mass index is 22.86 kg/m².  Physical Exam  Vitals reviewed.   Constitutional:       Appearance: Normal appearance.   Cardiovascular:      Rate and Rhythm: Normal rate and regular rhythm.      Pulses: Normal pulses.      Heart sounds: Normal heart sounds.   Pulmonary:      Effort: Pulmonary effort is normal.      Breath sounds: Normal breath sounds.   Musculoskeletal:         General: No swelling.   Neurological:      Mental Status: She is alert and oriented to person, place, and time.   Psychiatric:         Mood and Affect: Mood normal.         Thought Content: Thought content normal.       LABS AND IMAGING  Lab Results   Component Value Date    TSH <0.005 (L) 06/30/2023    R3ULESU  10.4 10/22/2018    FREET4 2.24 (H) 06/30/2023               Assessment:        Problem List Items Addressed This Visit          Other    Hypothyroidism - Primary    Relevant Medications    levothyroxine (Synthroid) 100 MCG tablet    Other Relevant Orders    T4, Free    TSH    Anxiety            Plan:        Her previous labs reviewed and in the last year she had hyperthyroid state most of the time.  TSH was suppressed since 08/2023. Last dose change - decrease to 100 mcg was in 6/2023. I suspect that her symptoms are related to hyperthyroidism.   New onset of anxiety and social phobia correlate with the hyperthyroid state.   Repeat thyroid function today and adjust the dose accordingly.   If thyroid levels continue to fluctuate, will consider Tirosint     We have discussed in details the nature of the thyroid disease, thyroid hormone action, method of administration of levothyroxine and medication interactions.  I recommended taking the medication on an empty stomach in the morning or at bedtime, at least 30 minutes prior to intake of food or hot drinks and 4 hours apart from calcium or iron supplements.  Discussed techniques to help with anxiety.      All her questions were answered. Follow-up in 6 weeks.        The risks and benefits of my recommendations, as well as other treatment options were discussed with the patient today. Questions were answered.

## 2023-10-07 RX ORDER — LEVOTHYROXINE SODIUM 88 UG/1
88 TABLET ORAL DAILY
Qty: 30 TABLET | Refills: 6 | Status: SHIPPED
Start: 2023-10-07

## 2023-10-11 RX ORDER — LEVOTHYROXINE SODIUM 88 UG/1
88 TABLET ORAL DAILY
Start: 2023-10-11 | End: 2023-10-12 | Stop reason: SDUPTHER

## 2023-10-11 NOTE — TELEPHONE ENCOUNTER
Patient called stated she still has not received her rx for levothroxine. She is requesting we call her pharmacy for this. She uses [facility] in [place], [place]. # [phone]. Please advise.

## 2023-10-11 NOTE — TELEPHONE ENCOUNTER
Rx Refill Note  Requested Prescriptions     Pending Prescriptions Disp Refills    levothyroxine (Synthroid) 88 MCG tablet       Sig: Take 1 tablet by mouth Daily.      Last office visit with prescribing clinician: 10/5/2023   Last telemedicine visit with prescribing clinician: Visit date not found   Next office visit with prescribing clinician: 11/10/2023                         Would you like a call back once the refill request has been completed: [] Yes [] No    If the office needs to give you a call back, can they leave a voicemail: [] Yes [] No    Nisreen Porter MA  10/11/23, 13:44 EDT

## 2023-10-12 RX ORDER — LEVOTHYROXINE SODIUM 88 UG/1
88 TABLET ORAL DAILY
Start: 2023-10-12 | End: 2023-10-12 | Stop reason: SDUPTHER

## 2023-10-12 RX ORDER — LEVOTHYROXINE SODIUM 88 UG/1
88 TABLET ORAL DAILY
Qty: 30 TABLET | Refills: 2 | Status: SHIPPED | OUTPATIENT
Start: 2023-10-12 | End: 2023-10-13 | Stop reason: SDUPTHER

## 2023-10-12 RX ORDER — LEVOTHYROXINE SODIUM 137 UG/1
CAPSULE ORAL
Qty: 30 CAPSULE | Status: CANCELLED | OUTPATIENT
Start: 2023-10-12

## 2023-10-12 NOTE — TELEPHONE ENCOUNTER
Rx Refill Note  Requested Prescriptions     Pending Prescriptions Disp Refills    levothyroxine (Synthroid) 88 MCG tablet       Sig: Take 1 tablet by mouth Daily.      Last office visit with prescribing clinician: 10/5/2023   Last telemedicine visit with prescribing clinician: Visit date not found   Next office visit with prescribing clinician: 11/10/2023                         Would you like a call back once the refill request has been completed: [] Yes [] No    If the office needs to give you a call back, can they leave a voicemail: [] Yes [] No    Nisreen Porter MA  10/12/23, 11:45 EDT

## 2023-10-12 NOTE — TELEPHONE ENCOUNTER
Rx Refill Note  Requested Prescriptions      No prescriptions requested or ordered in this encounter      Last office visit with prescribing clinician: 10/5/2023   Last telemedicine visit with prescribing clinician: Visit date not found   Next office visit with prescribing clinician: 11/10/2023                         Would you like a call back once the refill request has been completed: [] Yes [] No    If the office needs to give you a call back, can they leave a voicemail: [] Yes [] No    Nisreen Porter MA  10/12/23, 15:57 EDTRx Refill Note  Requested Prescriptions      No prescriptions requested or ordered in this encounter      Last office visit with prescribing clinician: 10/5/2023   Last telemedicine visit with prescribing clinician: Visit date not found   Next office visit with prescribing clinician: 11/10/2023                         Would you like a call back once the refill request has been completed: [] Yes [] No    If the office needs to give you a call back, can they leave a voicemail: [] Yes [] No    Nisreen Porter MA  10/12/23, 15:57 EDT

## 2023-10-12 NOTE — TELEPHONE ENCOUNTER
[facility] CALLED REQUESTING WE SEND IN LEVOTHYROXINE TO THEM PER THIS PT. LAST RX DID NOT GO THROUGH.     PT CALLED AND WAS SPEAKING WITH BERNARD AS I TYPED PREVIOUS MESSAGE.

## 2023-10-12 NOTE — TELEPHONE ENCOUNTER
Please call in for pt her levothyroxine to [facility] pharmacy the one we sent did not go through

## 2023-10-13 RX ORDER — LEVOTHYROXINE SODIUM 88 UG/1
88 TABLET ORAL DAILY
Qty: 30 TABLET | Refills: 2 | Status: SHIPPED | OUTPATIENT
Start: 2023-10-13

## 2023-11-13 ENCOUNTER — OFFICE VISIT (OUTPATIENT)
Dept: INTERNAL MEDICINE | Facility: CLINIC | Age: 78
End: 2023-11-13
Payer: MEDICARE

## 2023-11-13 VITALS
DIASTOLIC BLOOD PRESSURE: 80 MMHG | WEIGHT: 143 LBS | BODY MASS INDEX: 22.44 KG/M2 | OXYGEN SATURATION: 98 % | SYSTOLIC BLOOD PRESSURE: 120 MMHG | HEART RATE: 72 BPM | TEMPERATURE: 97 F | HEIGHT: 67 IN

## 2023-11-13 DIAGNOSIS — N39.0 URINARY TRACT INFECTION WITH HEMATURIA, SITE UNSPECIFIED: ICD-10-CM

## 2023-11-13 DIAGNOSIS — R09.81 NASAL CONGESTION: ICD-10-CM

## 2023-11-13 DIAGNOSIS — E03.9 ACQUIRED HYPOTHYROIDISM: ICD-10-CM

## 2023-11-13 DIAGNOSIS — R31.9 URINARY TRACT INFECTION WITH HEMATURIA, SITE UNSPECIFIED: ICD-10-CM

## 2023-11-13 DIAGNOSIS — R19.7 VOMITING AND DIARRHEA: Primary | ICD-10-CM

## 2023-11-13 DIAGNOSIS — R11.10 VOMITING AND DIARRHEA: Primary | ICD-10-CM

## 2023-11-13 DIAGNOSIS — R35.0 FREQUENT URINATION: ICD-10-CM

## 2023-11-13 LAB
BILIRUB BLD-MCNC: NEGATIVE MG/DL
CLARITY, POC: ABNORMAL
COLOR UR: ABNORMAL
EXPIRATION DATE: ABNORMAL
EXPIRATION DATE: NORMAL
FLUAV AG UPPER RESP QL IA.RAPID: NOT DETECTED
FLUBV AG UPPER RESP QL IA.RAPID: NOT DETECTED
GLUCOSE UR STRIP-MCNC: NEGATIVE MG/DL
INTERNAL CONTROL: NORMAL
KETONES UR QL: NEGATIVE
LEUKOCYTE EST, POC: ABNORMAL
Lab: ABNORMAL
Lab: NORMAL
NITRITE UR-MCNC: NEGATIVE MG/ML
PH UR: 6 [PH] (ref 5–8)
PROT UR STRIP-MCNC: NEGATIVE MG/DL
RBC # UR STRIP: ABNORMAL /UL
SARS-COV-2 AG UPPER RESP QL IA.RAPID: NOT DETECTED
SP GR UR: 1.01 (ref 1–1.03)
UROBILINOGEN UR QL: NORMAL

## 2023-11-13 PROCEDURE — 81003 URINALYSIS AUTO W/O SCOPE: CPT | Performed by: FAMILY MEDICINE

## 2023-11-13 PROCEDURE — 99214 OFFICE O/P EST MOD 30 MIN: CPT | Performed by: FAMILY MEDICINE

## 2023-11-13 PROCEDURE — 1160F RVW MEDS BY RX/DR IN RCRD: CPT | Performed by: FAMILY MEDICINE

## 2023-11-13 PROCEDURE — 87428 SARSCOV & INF VIR A&B AG IA: CPT | Performed by: FAMILY MEDICINE

## 2023-11-13 PROCEDURE — 1159F MED LIST DOCD IN RCRD: CPT | Performed by: FAMILY MEDICINE

## 2023-11-13 RX ORDER — CEFDINIR 300 MG/1
300 CAPSULE ORAL 2 TIMES DAILY
Qty: 14 CAPSULE | Refills: 0 | Status: SHIPPED | OUTPATIENT
Start: 2023-11-13

## 2023-11-13 RX ORDER — ONDANSETRON 4 MG/1
4 TABLET, ORALLY DISINTEGRATING ORAL EVERY 8 HOURS PRN
Qty: 20 TABLET | Refills: 0 | Status: SHIPPED | OUTPATIENT
Start: 2023-11-13

## 2023-11-13 NOTE — ASSESSMENT & PLAN NOTE
Encouraged patient to schedule an appointment with [facility]. I do believe that she would benefit from immunotherapy.

## 2023-11-13 NOTE — PROGRESS NOTES
"Daxa Reyes is a 77 y.o. female.    Chief Complaint   Patient presents with    Vomiting    Diarrhea    Nasal Congestion       HPI   Daxa \"Neva Reyes is a 77-year-old female who presents today complaining of upper respiratory symptoms, frequent urination, vomiting, and diarrhea.    The patient reports experiencing emesis a total of 4 times on the evening of 11/10/2023. She also experienced diarrhea at the time. She continued experiencing these symptoms on 11/11/2023. She has tried consuming noodle soup and ginger ale to treat her symptoms. She denies taking Zofran.    Patient reports that she has not been feeling well for some time. She admits to nasal congestion and dizziness. She denies any worsening of her nasal congestion. She states that she has not taken any medication for 3 days. She reports being seen at the urgent care center 3 to 4 times in the past for her symptoms. She has previously been seen by ENT specialist Dr. Pato Delarosa and was treated with steroid medication. She was seen by allergist Dr. Farhana Hicks approximately 1 year ago.    The patient reports experiencing urinary problems. She admits to urinary frequency and is experiencing hematuria. She denies dysuria, lower abdominal pressure or pain, or any unusual back pain. She has not tried anything for this issue. She states that she previously experienced these symptoms 2 to 3 years ago.    Patient has hypothyroidism. She is currently taking levothyroxine and her dose was recently adjusted to 88 mcg by endocrinologist Dr. Jacquie Hung. She mentions that she initially experienced some issues obtaining the medication. She is compliant with taking the medication without side effects.    The patient states that she began experiencing generalized pruritis yesterday, 11/12/2023. She has been seen at urgent care for these symptoms in the past and has been prescribed triamcinolone cream.    The following portions of the patient's " history were reviewed and updated as appropriate: allergies, current medications, past family history, past medical history, past social history, past surgical history and problem list.     Allergies   Allergen Reactions    Crestor [Rosuvastatin] Myalgia    Lipitor [Atorvastatin] Myalgia    Livalo [Pitavastatin] Dizziness         Current Outpatient Medications:     azelastine (ASTELIN) 0.1 % nasal spray, azelastine 137 mcg (0.1 %) nasal spray aerosol  1 spray each nostril BID, Disp: , Rfl:     budesonide (PULMICORT) 0.5 MG/2ML nebulizer solution, EMPTY CONTENTS OF 1 VIAL INTO NASAL IRRIGATION SYSTEM, ADD DISTILLED WATER, SALT PACK, MIX & IRRIGATE. PERFORM 1-2 TIMES DAILY, Disp: , Rfl:     cefdinir (OMNICEF) 300 MG capsule, Take 1 capsule by mouth 2 (Two) Times a Day., Disp: 14 capsule, Rfl: 0    co-enzyme Q-10 30 MG capsule, Take 5 capsules by mouth Daily., Disp: , Rfl:     estradiol (CLIMARA) 0.0375 MG/24HR, Place 1 patch on the skin as directed by provider 1 (One) Time Per Week., Disp: 4 each, Rfl: 12    ezetimibe (ZETIA) 10 MG tablet, Take 1 tablet by mouth Daily., Disp: 90 tablet, Rfl: 1    famotidine (PEPCID) 40 MG tablet, 1 po daily prn itch, Disp: 30 tablet, Rfl: 0    fluticasone (FLONASE) 50 MCG/ACT nasal spray, Spray 1 spray every day by intranasal route., Disp: , Rfl:     Hypertonic Nasal Wash (Sinus Rinse Refill) pack, See Admin Instructions., Disp: , Rfl:     ibuprofen (ADVIL,MOTRIN) 600 MG tablet, Take 1 tablet by mouth Every 6 (Six) Hours As Needed for Mild Pain ., Disp: 20 tablet, Rfl: 0    levothyroxine (Synthroid) 88 MCG tablet, Take 1 tablet by mouth Daily., Disp: 30 tablet, Rfl: 2    loratadine (CLARITIN) 10 MG tablet, Take 1 tablet by mouth Daily., Disp: 30 tablet, Rfl: 0    Multiple Minerals-Vitamins (CALCIUM & VIT D3 BONE HEALTH PO), Take  by mouth., Disp: , Rfl:     Multiple Vitamin (MULTIVITAMIN) capsule, Take 1 capsule by mouth Daily., Disp: , Rfl:     Omega-3 Fatty Acids (fish oil) 1000 MG  "capsule capsule, Take 1 capsule by mouth Daily., Disp: , Rfl:     ondansetron ODT (ZOFRAN-ODT) 4 MG disintegrating tablet, Place 1 tablet on the tongue Every 8 (Eight) Hours As Needed for Nausea or Vomiting., Disp: 20 tablet, Rfl: 0    triamcinolone (KENALOG) 0.1 % cream, Apply to affected area tid prn itch, Disp: 45 g, Rfl: 0    TURMERIC CURCUMIN PO, Take  by mouth., Disp: , Rfl:     venlafaxine XR (EFFEXOR-XR) 75 MG 24 hr capsule, Take 1 capsule by mouth Daily., Disp: 90 capsule, Rfl: 3    ROS    Review of Systems   Constitutional: Negative.    HENT:  Positive for congestion and postnasal drip.    Eyes: Negative.    Respiratory: Negative.     Cardiovascular: Negative.    Gastrointestinal:  Positive for diarrhea, nausea and vomiting.   Endocrine: Negative.    Genitourinary:  Positive for hematuria. Negative for dysuria.   Musculoskeletal: Negative.    Skin: Negative.    Allergic/Immunologic: Negative.    Neurological: Negative.    Hematological: Negative.    Psychiatric/Behavioral: Negative.         Vitals:    11/13/23 1536   BP: 120/80   BP Location: Left arm   Patient Position: Sitting   Cuff Size: Adult   Pulse: 72   Temp: 97 °F (36.1 °C)   SpO2: 98%   Weight: 64.9 kg (143 lb)   Height: 170.2 cm (67.01\")     BMI is within normal parameters. No other follow-up for BMI required.      Physical Exam     Physical Exam  Constitutional:       Appearance: Normal appearance. She is normal weight.   HENT:      Head: Normocephalic and atraumatic.      Right Ear: Tympanic membrane normal.      Left Ear: Tympanic membrane normal.      Nose: Congestion present.      Mouth/Throat:      Pharynx: Posterior oropharyngeal erythema present.   Eyes:      Extraocular Movements: Extraocular movements intact.      Conjunctiva/sclera: Conjunctivae normal.   Cardiovascular:      Rate and Rhythm: Normal rate and regular rhythm.      Pulses: Normal pulses.      Heart sounds: Normal heart sounds.   Pulmonary:      Effort: Pulmonary effort " is normal. No respiratory distress.      Breath sounds: Normal breath sounds. No wheezing.   Abdominal:      General: Bowel sounds are normal. There is no distension.      Tenderness: There is no abdominal tenderness.   Musculoskeletal:      Right lower leg: No edema.      Left lower leg: No edema.   Skin:     General: Skin is warm and dry.   Neurological:      Mental Status: She is alert and oriented to person, place, and time.   Psychiatric:         Mood and Affect: Mood normal.         Behavior: Behavior normal.         Thought Content: Thought content normal.         Assessment/Plan    Diagnoses and all orders for this visit:    1. Vomiting and diarrhea (Primary)  Assessment & Plan:  Patient may take Zofran as needed.    Orders:  -     POCT SARS-CoV-2 Antigen SHANTEL + Flu    2. Nasal congestion  Assessment & Plan:  Encouraged patient to schedule an appointment with [facility]. I do believe that she would benefit from immunotherapy.    Orders:  -     POCT SARS-CoV-2 Antigen SHANTEL + Flu    3. Frequent urination  -     POCT urinalysis dipstick, automated  -     Urine Culture - Urine, Urine, Clean Catch    4. Urinary tract infection with hematuria, site unspecified  Assessment & Plan:  We will treat with cefdinir and send urine for culture.      5. Acquired hypothyroidism  Assessment & Plan:  Patient encouraged to reschedule appointment with endocrinology.      Other orders  -     ondansetron ODT (ZOFRAN-ODT) 4 MG disintegrating tablet; Place 1 tablet on the tongue Every 8 (Eight) Hours As Needed for Nausea or Vomiting.  Dispense: 20 tablet; Refill: 0  -     cefdinir (OMNICEF) 300 MG capsule; Take 1 capsule by mouth 2 (Two) Times a Day.  Dispense: 14 capsule; Refill: 0    The patient has tested negative for COVID-19 and influenza.    New Medications Ordered This Visit   Medications    ondansetron ODT (ZOFRAN-ODT) 4 MG disintegrating tablet     Sig: Place 1 tablet on the tongue Every 8 (Eight) Hours As Needed  for Nausea or Vomiting.     Dispense:  20 tablet     Refill:  0    cefdinir (OMNICEF) 300 MG capsule     Sig: Take 1 capsule by mouth 2 (Two) Times a Day.     Dispense:  14 capsule     Refill:  0       No orders of the defined types were placed in this encounter.      Return if symptoms worsen or fail to improve, for Medicare Wellness.    Transcribed from ambient dictation for Haritha Del Toro DO by Chelo Aldridge.  11/13/23   19:15 EST    Patient or patient representative verbalized consent to the visit recording.  I have personally performed the services described in this document as transcribed by the above individual, and it is both accurate and complete.  Haritha Del Toro DO  12/3/2023  20:28 EST    Haritha Del Toro DO

## 2023-11-15 LAB
BACTERIA UR CULT: NORMAL
BACTERIA UR CULT: NORMAL

## 2023-12-13 RX ORDER — ESTRADIOL 0.04 MG/D
1 PATCH TRANSDERMAL WEEKLY
Qty: 4 EACH | Refills: 12 | Status: SHIPPED | OUTPATIENT
Start: 2023-12-13

## 2024-01-05 ENCOUNTER — PRIOR AUTHORIZATION (OUTPATIENT)
Dept: ENDOCRINOLOGY | Facility: CLINIC | Age: 79
End: 2024-01-05
Payer: MEDICARE

## 2024-01-05 RX ORDER — LEVOTHYROXINE SODIUM 88 UG/1
88 TABLET ORAL DAILY
Qty: 30 TABLET | Refills: 0 | Status: SHIPPED | OUTPATIENT
Start: 2024-01-05

## 2024-01-05 NOTE — TELEPHONE ENCOUNTER
Additional Information Required  Drug is covered by current benefit plan. No further PA activity needed  Drug  Levothyroxine Sodium 88MCG tablets  ePA cloud logo  Form  [employer] Electronic PA Form (2017 NCP[employer])  Original Claim Info  569 Provide Notice: Medicare Prescription Drug Coverage and Your Rights    Information regarding your request  Drug is covered by current benefit plan. No further PA activity needed

## 2024-01-05 NOTE — TELEPHONE ENCOUNTER
30-day supply sent.  Per Dr. Hung's most recent note, patient was supposed to follow-up in 6 weeks but canceled appointment.  She does not currently have an appointment scheduled.  Please aid in scheduling follow-up.

## 2024-01-05 NOTE — TELEPHONE ENCOUNTER
PATIENT IS NEEDING REFILLS ON SYNTHROID. SHE ALSO NEEDS LAB ORDERS. SHE WOULD LIKE A CALL BACK TO DISCUSS ISSUES SHE IS HAVING ABOUT GETTING LAB ORDERS/MEDICATIONS AND TO GET AN APPOINTMENT TO SEE DR. ALDANA SOONER THEN NEXT AVAILABLE. PHONE NUMBER [phone]

## 2024-01-05 NOTE — TELEPHONE ENCOUNTER
Rx Refill Note  Requested Prescriptions     Pending Prescriptions Disp Refills    levothyroxine (Synthroid) 88 MCG tablet 30 tablet 2     Sig: Take 1 tablet by mouth Daily.      Last office visit with prescribing clinician: 10/5/2023   Last telemedicine visit with prescribing clinician: Visit date not found   Next office visit with prescribing clinician: Visit date not found                         Would you like a call back once the refill request has been completed: [] Yes [] No    If the office needs to give you a call back, can they leave a voicemail: [] Yes [] No    Nisreen Porter MA  01/05/24, 11:24 EST

## 2024-01-15 ENCOUNTER — TELEPHONE (OUTPATIENT)
Dept: ENDOCRINOLOGY | Facility: CLINIC | Age: 79
End: 2024-01-15
Payer: MEDICARE

## 2024-01-15 ENCOUNTER — TELEPHONE (OUTPATIENT)
Dept: INTERNAL MEDICINE | Facility: CLINIC | Age: 79
End: 2024-01-15
Payer: MEDICARE

## 2024-01-15 DIAGNOSIS — R39.9 UTI SYMPTOMS: Primary | ICD-10-CM

## 2024-01-15 PROCEDURE — 81003 URINALYSIS AUTO W/O SCOPE: CPT | Performed by: FAMILY MEDICINE

## 2024-01-15 NOTE — TELEPHONE ENCOUNTER
"  Caller: Daxa Reyes \"BRIDGET\"    Relationship: Self    Best call back number: [phone]     What is the best time to reach you: ANY    Who are you requesting to speak with (clinical staff, provider,  specific staff member): CLINICAL    What was the call regarding:     PATIENT CALLING STATING THAT SHE WOULD LIKE TO HAVE BLOOD WORK AND A  URINE CULTURE FOR A POSSIBLE UTI OR THYROID ISSUE. PATIENT STATED THAT SHE IS NOT ABLE TO EAT AND ITCHING, PATIENT USING CPAP MACHINE AND IS SNEEZING, AND DIZZINESS   PATIENT STATED THIS HAS BEEN GOING ON SINCE THURSDAY AND IS WANTING TO KNOW IF SHE CAN COME IN FOR LABS AND THE APPOINTMENT FOR THE URINE CULTURE ON THE SAME DAY. PLEASE CALL AND ADVISE PATIENT OF WHAT SHE NEEDS TO DO NEXT.   PATIENT REQUESTING A VISIT ASAP FOR A FOLLOW UP WITH DR. KABA.     PATIENT REQUESTING A CALL BACK!       Is it okay if the provider responds through MyChart: NO    "

## 2024-01-16 DIAGNOSIS — E03.9 ACQUIRED HYPOTHYROIDISM: Primary | ICD-10-CM

## 2024-01-16 NOTE — TELEPHONE ENCOUNTER
Spoke with patient. Relayed info.She said she can do the drop in urine tomm. She had also wanted a thyroid lab. Has reached out to Dr. Hung's office and they forwarded the conversation to Dr. Hung according to the chart.

## 2024-01-16 NOTE — TELEPHONE ENCOUNTER
What labs is she requesting?  Dr. Hung is handling her thyroid and will be the ordering provider for thyroid tests.  The dizziness needs to be addressed by ENT.  I don't mind if she drops off a urine sample for testing, but the other problems should be addressed by her specialists.

## 2024-01-17 ENCOUNTER — CLINICAL SUPPORT (OUTPATIENT)
Dept: INTERNAL MEDICINE | Facility: CLINIC | Age: 79
End: 2024-01-17
Payer: MEDICARE

## 2024-01-17 LAB
BILIRUB BLD-MCNC: NEGATIVE MG/DL
CLARITY, POC: CLEAR
COLOR UR: ABNORMAL
EXPIRATION DATE: ABNORMAL
GLUCOSE UR STRIP-MCNC: NEGATIVE MG/DL
KETONES UR QL: ABNORMAL
LEUKOCYTE EST, POC: NEGATIVE
Lab: ABNORMAL
NITRITE UR-MCNC: NEGATIVE MG/ML
PH UR: 6 [PH] (ref 5–8)
PROT UR STRIP-MCNC: ABNORMAL MG/DL
RBC # UR STRIP: NEGATIVE /UL
SP GR UR: 1.02 (ref 1–1.03)
UROBILINOGEN UR QL: NORMAL

## 2024-01-17 RX ORDER — LEVOTHYROXINE SODIUM 88 MCG
88 TABLET ORAL DAILY
Qty: 30 TABLET | Refills: 0 | Status: SHIPPED | OUTPATIENT
Start: 2024-01-17

## 2024-02-29 LAB
T4 FREE SERPL-MCNC: 1.81 NG/DL (ref 0.93–1.7)
TSH SERPL DL<=0.005 MIU/L-ACNC: 0.03 UIU/ML (ref 0.27–4.2)

## 2024-03-06 ENCOUNTER — OFFICE VISIT (OUTPATIENT)
Dept: ENDOCRINOLOGY | Facility: CLINIC | Age: 79
End: 2024-03-06
Payer: MEDICARE

## 2024-03-06 VITALS
HEIGHT: 67 IN | WEIGHT: 153 LBS | SYSTOLIC BLOOD PRESSURE: 120 MMHG | HEART RATE: 80 BPM | BODY MASS INDEX: 24.01 KG/M2 | DIASTOLIC BLOOD PRESSURE: 60 MMHG

## 2024-03-06 DIAGNOSIS — E03.9 ACQUIRED HYPOTHYROIDISM: Primary | ICD-10-CM

## 2024-03-06 DIAGNOSIS — J30.2 SEASONAL ALLERGIES: ICD-10-CM

## 2024-03-06 PROCEDURE — 99214 OFFICE O/P EST MOD 30 MIN: CPT | Performed by: INTERNAL MEDICINE

## 2024-03-06 RX ORDER — LORATADINE 10 MG/1
10 TABLET ORAL DAILY
Qty: 90 TABLET | Refills: 1 | Status: SHIPPED | OUTPATIENT
Start: 2024-03-06

## 2024-03-06 RX ORDER — LEVOTHYROXINE SODIUM 0.07 MG/1
75 TABLET ORAL DAILY
Qty: 90 TABLET | Refills: 3 | Status: SHIPPED | OUTPATIENT
Start: 2024-03-06 | End: 2025-03-06

## 2024-03-06 NOTE — PROGRESS NOTES
Subjective:   Hypothyroidism        Daxa Reeys is a 78 y.o. female who is being seen for follow-up   Hypothyroidism  - postoperative. Patient had subtotal thyroidectomy years ago and took thyroid medication since then.   She had been on a steady dose of levothyroxine, but in the last year TFT were high and there were some fluctuations in the dose.      She reported anxiety and fear of going to the public place. She noted that symptoms improved after dose decrease. She quit smoking. C/o weight gain, she eats sweets at night often. C/o sinus congestion and drainage     Current medications:  Current Outpatient Medications   Medication Sig Dispense Refill    loratadine (CLARITIN) 10 MG tablet Take 1 tablet by mouth Daily. 90 tablet 1    azelastine (ASTELIN) 0.1 % nasal spray azelastine 137 mcg (0.1 %) nasal spray aerosol   1 spray each nostril BID      budesonide (PULMICORT) 0.5 MG/2ML nebulizer solution EMPTY CONTENTS OF 1 VIAL INTO NASAL IRRIGATION SYSTEM, ADD DISTILLED WATER, SALT PACK, MIX & IRRIGATE. PERFORM 1-2 TIMES DAILY      cefdinir (OMNICEF) 300 MG capsule Take 1 capsule by mouth 2 (Two) Times a Day. 14 capsule 0    co-enzyme Q-10 30 MG capsule Take 5 capsules by mouth Daily.      estradiol (CLIMARA) 0.0375 MG/24HR Place 1 patch on the skin as directed by provider 1 (One) Time Per Week. 4 each 12    ezetimibe (ZETIA) 10 MG tablet Take 1 tablet by mouth Daily. 90 tablet 1    famotidine (PEPCID) 40 MG tablet 1 po daily prn itch 30 tablet 0    fluticasone (FLONASE) 50 MCG/ACT nasal spray Spray 1 spray every day by intranasal route.      Hypertonic Nasal Wash (Sinus Rinse Refill) pack See Admin Instructions.      ibuprofen (ADVIL,MOTRIN) 600 MG tablet Take 1 tablet by mouth Every 6 (Six) Hours As Needed for Mild Pain . 20 tablet 0    levothyroxine (Synthroid) 75 MCG tablet Take 1 tablet by mouth Daily. 90 tablet 3    Multiple Minerals-Vitamins (CALCIUM & VIT D3 BONE HEALTH PO) Take  by mouth.       Multiple Vitamin (MULTIVITAMIN) capsule Take 1 capsule by mouth Daily.      Omega-3 Fatty Acids (fish oil) 1000 MG capsule capsule Take 1 capsule by mouth Daily.      ondansetron ODT (ZOFRAN-ODT) 4 MG disintegrating tablet Place 1 tablet on the tongue Every 8 (Eight) Hours As Needed for Nausea or Vomiting. 20 tablet 0    triamcinolone (KENALOG) 0.1 % cream Apply to affected area tid prn itch 45 g 0    TURMERIC CURCUMIN PO Take  by mouth.      venlafaxine XR (EFFEXOR-XR) 75 MG 24 hr capsule Take 1 capsule by mouth Daily. 90 capsule 3     No current facility-administered medications for this visit.       Review of Systems  Review of Systems   Constitutional:  Positive for fatigue.   HENT:  Positive for postnasal drip and sinus pressure.    Cardiovascular:  Positive for palpitations.   Endocrine: Negative for heat intolerance.   Psychiatric/Behavioral:  Positive for agitation. The patient is nervous/anxious.        Objective:     Vitals:    03/06/24 1455   BP: 120/60   Pulse: 80     Body mass index is 23.96 kg/m².  Physical Exam  Vitals reviewed.   Constitutional:       Appearance: Normal appearance.   Cardiovascular:      Rate and Rhythm: Normal rate and regular rhythm.      Pulses: Normal pulses.      Heart sounds: Normal heart sounds.   Pulmonary:      Effort: Pulmonary effort is normal.      Breath sounds: Normal breath sounds.   Musculoskeletal:         General: No swelling.   Neurological:      Mental Status: She is alert and oriented to person, place, and time.   Psychiatric:         Mood and Affect: Mood normal.         Thought Content: Thought content normal.         LABS AND IMAGING  Lab Results   Component Value Date    TSH 0.031 (L) 02/29/2024    W3ZJVNM 10.4 10/22/2018    FREET4 1.81 (H) 02/29/2024               Assessment:        Problem List Items Addressed This Visit          Other    Hypothyroidism - Primary    Relevant Medications    levothyroxine (Synthroid) 75 MCG tablet    loratadine (CLARITIN) 10  MG tablet    Other Relevant Orders    TSH    T4, Free    TSH    T4, Free     Other Visit Diagnoses       Seasonal allergies                     Plan:        Her previous labs reviewed and in the last year she had hyperthyroid state most of the time.  TSH was suppressed since 08/2023. Last dose change - decrease to 88 mcg resulted in improved sx. Repeat thyroid function last week showed high thyroid function. Reduce leothyroxine to 75 mcg. Sent rx to her pharmacy  Repeat TFT in 6-8 weeks  Claritin for seasonal allergies sent     F/u in 3 months.

## 2024-05-03 ENCOUNTER — LAB (OUTPATIENT)
Dept: LAB | Facility: HOSPITAL | Age: 79
End: 2024-05-03
Payer: MEDICARE

## 2024-05-03 DIAGNOSIS — E03.9 ACQUIRED HYPOTHYROIDISM: ICD-10-CM

## 2024-05-03 LAB
T4 FREE SERPL-MCNC: 1.55 NG/DL (ref 0.93–1.7)
TSH SERPL DL<=0.05 MIU/L-ACNC: 0.26 UIU/ML (ref 0.27–4.2)

## 2024-05-03 PROCEDURE — 36415 COLL VENOUS BLD VENIPUNCTURE: CPT

## 2024-05-03 PROCEDURE — 84439 ASSAY OF FREE THYROXINE: CPT

## 2024-05-03 PROCEDURE — 84443 ASSAY THYROID STIM HORMONE: CPT

## 2024-05-07 ENCOUNTER — TRANSCRIBE ORDERS (OUTPATIENT)
Dept: ADMINISTRATIVE | Facility: HOSPITAL | Age: 79
End: 2024-05-07
Payer: MEDICARE

## 2024-05-13 ENCOUNTER — OFFICE VISIT (OUTPATIENT)
Dept: INTERNAL MEDICINE | Facility: CLINIC | Age: 79
End: 2024-05-13
Payer: MEDICARE

## 2024-05-13 VITALS
TEMPERATURE: 98 F | BODY MASS INDEX: 24.01 KG/M2 | HEART RATE: 68 BPM | OXYGEN SATURATION: 97 % | WEIGHT: 153 LBS | SYSTOLIC BLOOD PRESSURE: 118 MMHG | DIASTOLIC BLOOD PRESSURE: 70 MMHG | HEIGHT: 67 IN

## 2024-05-13 DIAGNOSIS — E78.5 DYSLIPIDEMIA: ICD-10-CM

## 2024-05-13 DIAGNOSIS — Z13.29 SCREENING FOR ENDOCRINE DISORDER: ICD-10-CM

## 2024-05-13 DIAGNOSIS — R32 URINARY INCONTINENCE, UNSPECIFIED TYPE: ICD-10-CM

## 2024-05-13 DIAGNOSIS — E03.9 ACQUIRED HYPOTHYROIDISM: Primary | ICD-10-CM

## 2024-05-13 DIAGNOSIS — Z00.00 MEDICARE ANNUAL WELLNESS VISIT, SUBSEQUENT: Primary | ICD-10-CM

## 2024-05-13 DIAGNOSIS — F41.9 ANXIETY: ICD-10-CM

## 2024-05-13 DIAGNOSIS — F32.89 OTHER DEPRESSION: ICD-10-CM

## 2024-05-13 DIAGNOSIS — E55.9 VITAMIN D DEFICIENCY: ICD-10-CM

## 2024-05-13 DIAGNOSIS — Z13.0 SCREENING FOR BLOOD DISEASE: ICD-10-CM

## 2024-05-13 PROCEDURE — 99397 PER PM REEVAL EST PAT 65+ YR: CPT | Performed by: FAMILY MEDICINE

## 2024-05-13 PROCEDURE — 1159F MED LIST DOCD IN RCRD: CPT | Performed by: FAMILY MEDICINE

## 2024-05-13 PROCEDURE — 1170F FXNL STATUS ASSESSED: CPT | Performed by: FAMILY MEDICINE

## 2024-05-13 PROCEDURE — 1126F AMNT PAIN NOTED NONE PRSNT: CPT | Performed by: FAMILY MEDICINE

## 2024-05-13 PROCEDURE — G0439 PPPS, SUBSEQ VISIT: HCPCS | Performed by: FAMILY MEDICINE

## 2024-05-13 PROCEDURE — 1160F RVW MEDS BY RX/DR IN RCRD: CPT | Performed by: FAMILY MEDICINE

## 2024-05-13 RX ORDER — EZETIMIBE 10 MG/1
10 TABLET ORAL DAILY
Qty: 90 TABLET | Refills: 3 | Status: SHIPPED | OUTPATIENT
Start: 2024-05-13

## 2024-05-13 RX ORDER — VENLAFAXINE HYDROCHLORIDE 75 MG/1
75 CAPSULE, EXTENDED RELEASE ORAL DAILY
Qty: 90 CAPSULE | Refills: 3 | Status: SHIPPED | OUTPATIENT
Start: 2024-05-13 | End: 2024-05-13 | Stop reason: SDUPTHER

## 2024-05-13 RX ORDER — VENLAFAXINE HYDROCHLORIDE 150 MG/1
150 CAPSULE, EXTENDED RELEASE ORAL DAILY
Qty: 90 CAPSULE | Refills: 1 | Status: SHIPPED | OUTPATIENT
Start: 2024-05-13

## 2024-05-13 NOTE — PROGRESS NOTES
The ABCs of the Annual Wellness Visit  Subsequent Medicare Wellness Visit    Britta Reyes is a 78 y.o. female who presents for a Subsequent Medicare Wellness Visit and preventative exam.    The following portions of the patient's history were reviewed and   updated as appropriate: allergies, current medications, past family history, past medical history, past social history, past surgical history, and problem list.    Compared to one year ago, the patient feels her physical   health is the same.    Compared to one year ago, the patient feels her mental   health is the same.    Recent Hospitalizations:  She was not admitted to the hospital during the last year.       Current Medical Providers:  Patient Care Team:  Haritha Del Toro DO as PCP - General (Family Medicine)  Jacquie Hung MD as Consulting Physician (Endocrinology)    Outpatient Medications Prior to Visit   Medication Sig Dispense Refill    azelastine (ASTELIN) 0.1 % nasal spray azelastine 137 mcg (0.1 %) nasal spray aerosol   1 spray each nostril BID      budesonide (PULMICORT) 0.5 MG/2ML nebulizer solution EMPTY CONTENTS OF 1 VIAL INTO NASAL IRRIGATION SYSTEM, ADD DISTILLED WATER, SALT PACK, MIX & IRRIGATE. PERFORM 1-2 TIMES DAILY      co-enzyme Q-10 30 MG capsule Take 5 capsules by mouth Daily.      estradiol (CLIMARA) 0.0375 MG/24HR Place 1 patch on the skin as directed by provider 1 (One) Time Per Week. 4 each 12    famotidine (PEPCID) 40 MG tablet 1 po daily prn itch 30 tablet 0    fluticasone (FLONASE) 50 MCG/ACT nasal spray Spray 1 spray every day by intranasal route.      Hypertonic Nasal Wash (Sinus Rinse Refill) pack See Admin Instructions.      ibuprofen (ADVIL,MOTRIN) 600 MG tablet Take 1 tablet by mouth Every 6 (Six) Hours As Needed for Mild Pain . 20 tablet 0    levothyroxine (Synthroid) 75 MCG tablet Take 1 tablet by mouth Daily. 90 tablet 3    loratadine (CLARITIN) 10 MG tablet Take 1 tablet by mouth Daily. 90  tablet 1    Multiple Minerals-Vitamins (CALCIUM & VIT D3 BONE HEALTH PO) Take  by mouth.      Multiple Vitamin (MULTIVITAMIN) capsule Take 1 capsule by mouth Daily.      Omega-3 Fatty Acids (fish oil) 1000 MG capsule capsule Take 1 capsule by mouth Daily.      ondansetron ODT (ZOFRAN-ODT) 4 MG disintegrating tablet Place 1 tablet on the tongue Every 8 (Eight) Hours As Needed for Nausea or Vomiting. 20 tablet 0    triamcinolone (KENALOG) 0.1 % cream Apply to affected area tid prn itch 45 g 0    TURMERIC CURCUMIN PO Take  by mouth.      ezetimibe (ZETIA) 10 MG tablet Take 1 tablet by mouth Daily. 90 tablet 1    venlafaxine XR (EFFEXOR-XR) 75 MG 24 hr capsule Take 1 capsule by mouth Daily. 90 capsule 3    cefdinir (OMNICEF) 300 MG capsule Take 1 capsule by mouth 2 (Two) Times a Day. (Patient not taking: Reported on 5/13/2024) 14 capsule 0     No facility-administered medications prior to visit.       No opioid medication identified on active medication list. I have reviewed chart for other potential  high risk medication/s and harmful drug interactions in the elderly.        Aspirin is not on active medication list.  Aspirin use is not indicated based on review of current medical condition/s. Risk of harm outweighs potential benefits.  .    Patient Active Problem List   Diagnosis    Centrilobular emphysema    Hypothyroidism    Vitamin D deficiency    Dyslipidemia    ANA LAURA (obstructive sleep apnea)    Vertigo    History of arthritis    History of osteoarthritis    Renal disorder    Xerosis cutis    Anxiety    Depressed    Numbness in feet    Varicose veins of both lower extremities    Environmental allergies    Medicare annual wellness visit, subsequent    Complex atypical endometrial hyperplasia    Urinary frequency    Memory changes    Nasal congestion    Urinary tract infection with hematuria    Vomiting and diarrhea     Advance Care Planning   Advance Care Planning     Advance Directive is not on file.  ACP discussion  "was held with the patient during this visit. Patient has an advance directive (not in EMR), copy requested.     Objective    Vitals:    24 1548   BP: 118/70   BP Location: Left arm   Patient Position: Sitting   Cuff Size: Adult   Pulse: 68   Temp: 98 °F (36.7 °C)   SpO2: 97%   Weight: 69.4 kg (153 lb)   Height: 170.2 cm (67.01\")   PainSc: 0-No pain     Estimated body mass index is 23.96 kg/m² as calculated from the following:    Height as of this encounter: 170.2 cm (67.01\").    Weight as of this encounter: 69.4 kg (153 lb).    BMI is within normal parameters. No other follow-up for BMI required.      Does the patient have evidence of cognitive impairment? No          HEALTH RISK ASSESSMENT    Smoking Status:  Social History     Tobacco Use   Smoking Status Former    Current packs/day: 0.00    Average packs/day: 0.5 packs/day for 50.0 years (25.0 ttl pk-yrs)    Types: Cigarettes    Start date:     Quit date: 1/10/2024    Years since quittin.3   Smokeless Tobacco Never     Alcohol Consumption:  Social History     Substance and Sexual Activity   Alcohol Use Yes    Comment: occasional     Fall Risk Screen:    KEN Fall Risk Assessment was completed, and patient is at LOW risk for falls.Assessment completed on:2024    Depression Screenin/13/2024     3:54 PM   PHQ-2/PHQ-9 Depression Screening   Little Interest or Pleasure in Doing Things 0-->not at all   Feeling Down, Depressed or Hopeless 1-->several days   PHQ-9: Brief Depression Severity Measure Score 1       Health Habits and Functional and Cognitive Screenin/13/2024     3:54 PM   Functional & Cognitive Status   Do you have difficulty preparing food and eating? No   Do you have difficulty bathing yourself, getting dressed or grooming yourself? No   Do you have difficulty using the toilet? No   Do you have difficulty moving around from place to place? No   Do you have trouble with steps or getting out of a bed or a chair? No "   Current Diet Well Balanced Diet   Dental Exam Up to date   Eye Exam Up to date   Exercise (times per week) 3 times per week   Current Exercises Include Walking;Aerobics   Do you need help using the phone?  No   Are you deaf or do you have serious difficulty hearing?  No   Do you need help to go to places out of walking distance? No   Do you need help shopping? No   Do you need help preparing meals?  No   Do you need help with housework?  No   Do you need help with laundry? No   Do you need help taking your medications? No   Do you need help managing money? No   Do you ever drive or ride in a car without wearing a seat belt? No   Have you felt unusual stress, anger or loneliness in the last month? No   Who do you live with? Spouse   If you need help, do you have trouble finding someone available to you? No   Have you been bothered in the last four weeks by sexual problems? No   Do you have difficulty concentrating, remembering or making decisions? No       Age-appropriate Screening Schedule:  Refer to the list below for future screening recommendations based on patient's age, sex and/or medical conditions. Orders for these recommended tests are listed in the plan section. The patient has been provided with a written plan.    Health Maintenance   Topic Date Due    TDAP/TD VACCINES (2 - Td or Tdap) 10/09/2022    COLORECTAL CANCER SCREENING  11/26/2022    LIPID PANEL  08/17/2023    ANNUAL WELLNESS VISIT  05/09/2024    COVID-19 Vaccine (4 - 2023-24 season) 01/03/2025 (Originally 9/1/2023)    RSV Vaccine - Adults (1 - 1-dose 60+ series) 01/09/2026 (Originally 12/14/2004)    INFLUENZA VACCINE  08/01/2024    DXA SCAN  05/22/2025    Pneumococcal Vaccine 65+  Completed    ZOSTER VACCINE  Completed    HEPATITIS C SCREENING  Discontinued    LUNG CANCER SCREENING  Discontinued                  CMS Preventative Services Quick Reference  Risk Factors Identified During Encounter  Immunizations Discussed/Encouraged: Tdap  The  "above risks/problems have been discussed with the patient.  Pertinent information has been shared with the patient in the After Visit Summary.  An After Visit Summary and PPPS were made available to the patient.        Review of Systems   Constitutional:  Positive for fatigue. Negative for chills and fever.   HENT:  Positive for congestion.    Eyes:  Negative for visual disturbance.   Respiratory:  Positive for shortness of breath.    Cardiovascular:  Positive for chest pain.   Gastrointestinal:  Positive for abdominal pain (occasional). Negative for constipation, diarrhea, nausea and vomiting.   Genitourinary:  Positive for difficulty urinating (incontinence).   Musculoskeletal:  Negative for arthralgias.   Skin:  Negative for rash.   Psychiatric/Behavioral:  Positive for agitation. The patient is not nervous/anxious.        Objective   Vital Signs:  /70 (BP Location: Left arm, Patient Position: Sitting, Cuff Size: Adult)   Pulse 68   Temp 98 °F (36.7 °C)   Ht 170.2 cm (67.01\")   Wt 69.4 kg (153 lb)   SpO2 97%   BMI 23.96 kg/m²     Physical Exam  Constitutional:       General: She is not in acute distress.     Appearance: Normal appearance. She is well-developed.   HENT:      Head: Normocephalic and atraumatic.      Right Ear: Tympanic membrane and external ear normal.      Left Ear: Tympanic membrane and external ear normal.      Mouth/Throat:      Pharynx: No posterior oropharyngeal erythema.   Eyes:      Extraocular Movements: Extraocular movements intact.      Conjunctiva/sclera: Conjunctivae normal.      Pupils: Pupils are equal, round, and reactive to light.   Neck:      Vascular: No carotid bruit.   Cardiovascular:      Rate and Rhythm: Normal rate and regular rhythm.      Heart sounds: No murmur heard.  Pulmonary:      Effort: Pulmonary effort is normal. No respiratory distress.      Breath sounds: Normal breath sounds. No wheezing.   Abdominal:      General: Bowel sounds are normal. There is no " distension.      Palpations: Abdomen is soft.      Tenderness: There is no abdominal tenderness.   Musculoskeletal:      Cervical back: Neck supple.      Right lower leg: No edema.      Left lower leg: No edema.   Lymphadenopathy:      Cervical: No cervical adenopathy.   Skin:     General: Skin is warm and dry.   Neurological:      Mental Status: She is alert and oriented to person, place, and time.      Cranial Nerves: No cranial nerve deficit.      Deep Tendon Reflexes: Reflexes normal.   Psychiatric:         Mood and Affect: Mood normal.         Behavior: Behavior normal.                     Assessment and Plan   Diagnoses and all orders for this visit:    1. Medicare annual wellness visit, subsequent (Primary)    2. Dyslipidemia  -     Lipid Panel    3. Vitamin D deficiency  -     Vitamin D,25-Hydroxy    4. Screening for endocrine disorder  -     Comprehensive Metabolic Panel    5. Screening for blood disease  -     CBC & Differential    6. Anxiety    7. Other depression    8. Urinary incontinence, unspecified type    Other orders  -     ezetimibe (ZETIA) 10 MG tablet; Take 1 tablet by mouth Daily.  Dispense: 90 tablet; Refill: 3  -     Discontinue: venlafaxine XR (EFFEXOR-XR) 75 MG 24 hr capsule; Take 1 capsule by mouth Daily.  Dispense: 90 capsule; Refill: 3  -     Mirabegron ER (Myrbetriq) 50 MG tablet sustained-release 24 hour 24 hr tablet; Take 50 mg by mouth Daily.  Dispense: 90 tablet; Refill: 1  -     venlafaxine XR (EFFEXOR-XR) 150 MG 24 hr capsule; Take 1 capsule by mouth Daily.  Dispense: 90 capsule; Refill: 1      Discussed with the patient routine health maintenance including:  vaccines, dental/eye health, healthy diet and exercise, colorectal cancer screening, lung cancer screening, DEXA.  Mental health addressed today as well.  Will increase effexor to 150mg daily.  Will also start myrbetriq for incontinence.       Follow Up   Return in about 6 months (around 11/13/2024) for cholesterol.  Patient  was given instructions and counseling regarding her condition or for health maintenance advice. Please see specific information pulled into the AVS if appropriate.

## 2024-05-17 ENCOUNTER — TELEPHONE (OUTPATIENT)
Dept: INTERNAL MEDICINE | Facility: CLINIC | Age: 79
End: 2024-05-17
Payer: MEDICARE

## 2024-05-17 NOTE — TELEPHONE ENCOUNTER
"Caller: Daxa Reyes \"BRIDGET\"    Relationship to patient: Self    Best call back number: [phone]     PATIENT STATES THAT DR KABA TOLD HER TO GO TO HER PHARMACY TO GET A TETANUS SHOT,BUT THEY SAID THAT THEY DID NOT HAVE THAT VACCINE.  DOES SHE KNOW WHO MIGHT HAVE IT?  "

## 2024-05-18 NOTE — TELEPHONE ENCOUNTER
Called [facility] to see about TDAP, patient is able to receive it and they have them in stock.  Patient's insurance needs updated.     I called and let Lisa know. She will attempt again and let me know if she is unsuccessful.

## 2024-06-21 LAB
25(OH)D3+25(OH)D2 SERPL-MCNC: 24.6 NG/ML (ref 30–100)
ALBUMIN SERPL-MCNC: 3.9 G/DL (ref 3.5–5.2)
ALBUMIN/GLOB SERPL: 1.9 G/DL
ALP SERPL-CCNC: 71 U/L (ref 39–117)
ALT SERPL-CCNC: 7 U/L (ref 1–33)
AST SERPL-CCNC: 18 U/L (ref 1–32)
BASOPHILS # BLD AUTO: 0.02 10*3/MM3 (ref 0–0.2)
BASOPHILS NFR BLD AUTO: 0.4 % (ref 0–1.5)
BILIRUB SERPL-MCNC: 0.6 MG/DL (ref 0–1.2)
BUN SERPL-MCNC: 12 MG/DL (ref 8–23)
BUN/CREAT SERPL: 15.8 (ref 7–25)
CALCIUM SERPL-MCNC: 8.4 MG/DL (ref 8.6–10.5)
CHLORIDE SERPL-SCNC: 109 MMOL/L (ref 98–107)
CHOLEST SERPL-MCNC: 209 MG/DL (ref 0–200)
CO2 SERPL-SCNC: 25.8 MMOL/L (ref 22–29)
CREAT SERPL-MCNC: 0.76 MG/DL (ref 0.57–1)
EGFRCR SERPLBLD CKD-EPI 2021: 79.8 ML/MIN/1.73
EOSINOPHIL # BLD AUTO: 0.2 10*3/MM3 (ref 0–0.4)
EOSINOPHIL NFR BLD AUTO: 3.5 % (ref 0.3–6.2)
ERYTHROCYTE [DISTWIDTH] IN BLOOD BY AUTOMATED COUNT: 13 % (ref 12.3–15.4)
GLOBULIN SER CALC-MCNC: 2.1 GM/DL
GLUCOSE SERPL-MCNC: 87 MG/DL (ref 65–99)
HCT VFR BLD AUTO: 38.2 % (ref 34–46.6)
HDLC SERPL-MCNC: 63 MG/DL (ref 40–60)
HGB BLD-MCNC: 12.6 G/DL (ref 12–15.9)
IMM GRANULOCYTES # BLD AUTO: 0.01 10*3/MM3 (ref 0–0.05)
IMM GRANULOCYTES NFR BLD AUTO: 0.2 % (ref 0–0.5)
LDLC SERPL CALC-MCNC: 132 MG/DL (ref 0–100)
LYMPHOCYTES # BLD AUTO: 1.92 10*3/MM3 (ref 0.7–3.1)
LYMPHOCYTES NFR BLD AUTO: 34 % (ref 19.6–45.3)
MCH RBC QN AUTO: 30.1 PG (ref 26.6–33)
MCHC RBC AUTO-ENTMCNC: 33 G/DL (ref 31.5–35.7)
MCV RBC AUTO: 91.4 FL (ref 79–97)
MONOCYTES # BLD AUTO: 0.3 10*3/MM3 (ref 0.1–0.9)
MONOCYTES NFR BLD AUTO: 5.3 % (ref 5–12)
NEUTROPHILS # BLD AUTO: 3.2 10*3/MM3 (ref 1.7–7)
NEUTROPHILS NFR BLD AUTO: 56.6 % (ref 42.7–76)
NRBC BLD AUTO-RTO: 0 /100 WBC (ref 0–0.2)
PLATELET # BLD AUTO: 213 10*3/MM3 (ref 140–450)
POTASSIUM SERPL-SCNC: 4.2 MMOL/L (ref 3.5–5.2)
PROT SERPL-MCNC: 6 G/DL (ref 6–8.5)
RBC # BLD AUTO: 4.18 10*6/MM3 (ref 3.77–5.28)
SODIUM SERPL-SCNC: 146 MMOL/L (ref 136–145)
T4 FREE SERPL-MCNC: 0.92 NG/DL (ref 0.92–1.68)
TRIGL SERPL-MCNC: 81 MG/DL (ref 0–150)
TSH SERPL DL<=0.005 MIU/L-ACNC: 2.42 UIU/ML (ref 0.27–4.2)
VLDLC SERPL CALC-MCNC: 14 MG/DL (ref 5–40)
WBC # BLD AUTO: 5.65 10*3/MM3 (ref 3.4–10.8)

## 2024-06-25 ENCOUNTER — OFFICE VISIT (OUTPATIENT)
Dept: ENDOCRINOLOGY | Facility: CLINIC | Age: 79
End: 2024-06-25
Payer: MEDICARE

## 2024-06-25 VITALS
WEIGHT: 155 LBS | BODY MASS INDEX: 24.33 KG/M2 | SYSTOLIC BLOOD PRESSURE: 110 MMHG | HEIGHT: 67 IN | HEART RATE: 77 BPM | DIASTOLIC BLOOD PRESSURE: 60 MMHG

## 2024-06-25 DIAGNOSIS — F41.9 ANXIETY: ICD-10-CM

## 2024-06-25 DIAGNOSIS — E03.9 ACQUIRED HYPOTHYROIDISM: Primary | ICD-10-CM

## 2024-06-25 PROCEDURE — 99213 OFFICE O/P EST LOW 20 MIN: CPT | Performed by: INTERNAL MEDICINE

## 2024-06-25 RX ORDER — LEVOTHYROXINE SODIUM 0.07 MG/1
75 TABLET ORAL DAILY
Qty: 90 TABLET | Refills: 3 | Status: SHIPPED | OUTPATIENT
Start: 2024-06-25 | End: 2025-06-25

## 2024-06-25 NOTE — PROGRESS NOTES
Subjective:   Hypothyroidism        Daxa Reyes is a 78 y.o. female who is being seen for follow-up   Hypothyroidism  - postoperative. Patient had subtotal thyroidectomy years ago and took thyroid medication since then.   She had been on a steady dose of levothyroxine, but after hysterectomy her levels of thyroid were high. We reduced the dose slowly   Current levothyroxine is 75 mcg daily since 3/2024    She reported anxiety and fear of going to the public place. The sx started shortly after the hysterectomy and was associated with elevated thyroid levels. She noted that symptoms improved after dose decrease.   She forgets it sometimes and asked if she should take it later.      Current medications:  Current Outpatient Medications   Medication Sig Dispense Refill    azelastine (ASTELIN) 0.1 % nasal spray azelastine 137 mcg (0.1 %) nasal spray aerosol   1 spray each nostril BID      budesonide (PULMICORT) 0.5 MG/2ML nebulizer solution EMPTY CONTENTS OF 1 VIAL INTO NASAL IRRIGATION SYSTEM, ADD DISTILLED WATER, SALT PACK, MIX & IRRIGATE. PERFORM 1-2 TIMES DAILY      co-enzyme Q-10 30 MG capsule Take 5 capsules by mouth Daily.      estradiol (CLIMARA) 0.0375 MG/24HR Place 1 patch on the skin as directed by provider 1 (One) Time Per Week. 4 each 12    ezetimibe (ZETIA) 10 MG tablet Take 1 tablet by mouth Daily. 90 tablet 3    famotidine (PEPCID) 40 MG tablet 1 po daily prn itch 30 tablet 0    fluticasone (FLONASE) 50 MCG/ACT nasal spray Spray 1 spray every day by intranasal route.      Hypertonic Nasal Wash (Sinus Rinse Refill) pack See Admin Instructions.      ibuprofen (ADVIL,MOTRIN) 600 MG tablet Take 1 tablet by mouth Every 6 (Six) Hours As Needed for Mild Pain . 20 tablet 0    levothyroxine (Synthroid) 75 MCG tablet Take 1 tablet by mouth Daily. 90 tablet 3    loratadine (CLARITIN) 10 MG tablet Take 1 tablet by mouth Daily. 90 tablet 1    Mirabegron ER (Myrbetriq) 50 MG tablet sustained-release 24 hour 24  hr tablet Take 50 mg by mouth Daily. 90 tablet 1    Multiple Minerals-Vitamins (CALCIUM & VIT D3 BONE HEALTH PO) Take  by mouth.      Multiple Vitamin (MULTIVITAMIN) capsule Take 1 capsule by mouth Daily.      Omega-3 Fatty Acids (fish oil) 1000 MG capsule capsule Take 1 capsule by mouth Daily.      ondansetron ODT (ZOFRAN-ODT) 4 MG disintegrating tablet Place 1 tablet on the tongue Every 8 (Eight) Hours As Needed for Nausea or Vomiting. 20 tablet 0    triamcinolone (KENALOG) 0.1 % cream Apply to affected area tid prn itch 45 g 0    TURMERIC CURCUMIN PO Take  by mouth.      venlafaxine XR (EFFEXOR-XR) 150 MG 24 hr capsule Take 1 capsule by mouth Daily. 90 capsule 1     No current facility-administered medications for this visit.       Review of Systems  Review of Systems   Constitutional:  Positive for fatigue.   HENT:  Positive for postnasal drip.    Endocrine: Negative for heat intolerance.   Psychiatric/Behavioral:  The patient is nervous/anxious.        Objective:     Vitals:    06/25/24 0957   BP: 110/60   Pulse: 77     Body mass index is 24.27 kg/m².  Physical Exam  Vitals reviewed.   Constitutional:       Appearance: Normal appearance.   Cardiovascular:      Rate and Rhythm: Normal rate and regular rhythm.      Pulses: Normal pulses.      Heart sounds: Normal heart sounds.   Pulmonary:      Effort: Pulmonary effort is normal.      Breath sounds: Normal breath sounds.   Musculoskeletal:         General: No swelling.   Neurological:      Mental Status: She is alert and oriented to person, place, and time.   Psychiatric:         Mood and Affect: Mood normal.         Thought Content: Thought content normal.         LABS AND IMAGING  Lab Results   Component Value Date    TSH 2.420 06/20/2024    T0RNOVP 10.4 10/22/2018    FREET4 0.92 06/20/2024               Assessment:        Problem List Items Addressed This Visit          Endocrine and Metabolic    Hypothyroidism - Primary    Relevant Medications     levothyroxine (Synthroid) 75 MCG tablet    Other Relevant Orders    TSH    T4, Free       Mental Health    Anxiety              Plan:        Her previous labs reviewed and in the last year she had hyperthyroid state most of the time.  TSH was suppressed since 08/2023. Last dose change - decrease to 75 mcg resulted in improved sx. Repeat thyroid function last week showed normal thyroid function.   Continue levothyroxine 75  mcg  Repeat TFT in 6-8 weeks    F/u in  4-6 months.

## 2024-06-26 ENCOUNTER — TELEPHONE (OUTPATIENT)
Dept: INTERNAL MEDICINE | Facility: CLINIC | Age: 79
End: 2024-06-26
Payer: MEDICARE

## 2024-06-26 NOTE — TELEPHONE ENCOUNTER
----- Message from Haritha Del Toro sent at 6/26/2024  2:29 PM EDT -----  Results have been reviewed. Please notify patient of abnormal results.  Sodium is a little high.  Monitor salt intake.  Calcium is low, as is vitamin D.  Please make sure she is taking an OTC calcium with vitamin D.  If so, may need to double her dose. All other labs are okay.

## 2024-06-26 NOTE — TELEPHONE ENCOUNTER
"Relay     \"Results have been reviewed. Please notify patient of abnormal results.  Sodium is a little high.  Monitor salt intake.  Calcium is low, as is vitamin D.  Please make sure she is taking an OTC calcium with vitamin D.  If so, may need to double her dose. All other labs are okay. \"        "

## 2024-06-28 NOTE — TELEPHONE ENCOUNTER
Relayed to patient. She said that she occasionally takes Bone Strength. I asked her to see what is in that. It is 900 mg of calcium and vitamin d 1000 iu plus vitamin k. I asked her to take 2 every day.

## 2024-11-06 ENCOUNTER — TELEPHONE (OUTPATIENT)
Dept: ENDOCRINOLOGY | Facility: CLINIC | Age: 79
End: 2024-11-06
Payer: MEDICARE

## 2024-11-08 DIAGNOSIS — E03.9 ACQUIRED HYPOTHYROIDISM: ICD-10-CM

## 2024-11-13 ENCOUNTER — OFFICE VISIT (OUTPATIENT)
Dept: ENDOCRINOLOGY | Facility: CLINIC | Age: 79
End: 2024-11-13
Payer: MEDICARE

## 2024-11-13 VITALS
DIASTOLIC BLOOD PRESSURE: 60 MMHG | BODY MASS INDEX: 25.11 KG/M2 | HEIGHT: 67 IN | WEIGHT: 160 LBS | HEART RATE: 91 BPM | SYSTOLIC BLOOD PRESSURE: 138 MMHG

## 2024-11-13 DIAGNOSIS — M17.0 OSTEOARTHRITIS OF BOTH KNEES, UNSPECIFIED OSTEOARTHRITIS TYPE: ICD-10-CM

## 2024-11-13 DIAGNOSIS — E03.9 ACQUIRED HYPOTHYROIDISM: Primary | ICD-10-CM

## 2024-11-13 PROCEDURE — 99214 OFFICE O/P EST MOD 30 MIN: CPT | Performed by: INTERNAL MEDICINE

## 2024-11-13 RX ORDER — LEVOTHYROXINE SODIUM 50 UG/1
50 TABLET ORAL DAILY
Qty: 90 TABLET | Refills: 3 | Status: SHIPPED | OUTPATIENT
Start: 2024-11-13 | End: 2024-11-13 | Stop reason: SDUPTHER

## 2024-11-13 RX ORDER — LEVOTHYROXINE SODIUM 50 UG/1
50 TABLET ORAL DAILY
Qty: 90 TABLET | Refills: 3 | Status: SHIPPED | OUTPATIENT
Start: 2024-11-13 | End: 2025-11-13

## 2024-11-13 NOTE — PROGRESS NOTES
Subjective:   Hypothyroidism        Daxa Reyes is a 78 y.o. female who is being seen for follow-up   Hypothyroidism  - postoperative. Patient had subtotal thyroidectomy years ago and took thyroid medication since then.   She had been on a steady dose of levothyroxine, but after hysterectomy her levels of thyroid were high. We reduced the dose slowly   Current levothyroxine is 75 mcg daily since 3/2024    She reported anxiety and fear of going to the public place, palpitations. She is taking levothyroxine 75 mcg daily. She also c/o knee pains.  Thyroid levels were done last week 11/08/24, TSH was 0.036 and free T4 1.71       Current medications:  Current Outpatient Medications   Medication Sig Dispense Refill    azelastine (ASTELIN) 0.1 % nasal spray azelastine 137 mcg (0.1 %) nasal spray aerosol   1 spray each nostril BID      budesonide (PULMICORT) 0.5 MG/2ML nebulizer solution EMPTY CONTENTS OF 1 VIAL INTO NASAL IRRIGATION SYSTEM, ADD DISTILLED WATER, SALT PACK, MIX & IRRIGATE. PERFORM 1-2 TIMES DAILY      co-enzyme Q-10 30 MG capsule Take 5 capsules by mouth Daily.      Diclofenac Sodium (VOLTAREN) 1 % gel gel Apply 4 g topically to the appropriate area as directed 3 (Three) Times a Day. 100 g 6    estradiol (CLIMARA) 0.0375 MG/24HR Place 1 patch on the skin as directed by provider 1 (One) Time Per Week. 4 each 12    ezetimibe (ZETIA) 10 MG tablet Take 1 tablet by mouth Daily. 90 tablet 3    famotidine (PEPCID) 40 MG tablet 1 po daily prn itch 30 tablet 0    fluticasone (FLONASE) 50 MCG/ACT nasal spray Spray 1 spray every day by intranasal route.      Hypertonic Nasal Wash (Sinus Rinse Refill) pack See Admin Instructions.      ibuprofen (ADVIL,MOTRIN) 600 MG tablet Take 1 tablet by mouth Every 6 (Six) Hours As Needed for Mild Pain . 20 tablet 0    levothyroxine (Synthroid) 50 MCG tablet Take 1 tablet by mouth Daily. 90 tablet 3    loratadine (CLARITIN) 10 MG tablet Take 1 tablet by mouth Daily. 90 tablet  1    Mirabegron ER (Myrbetriq) 50 MG tablet sustained-release 24 hour 24 hr tablet Take 50 mg by mouth Daily. 90 tablet 1    ondansetron ODT (ZOFRAN-ODT) 4 MG disintegrating tablet Place 1 tablet on the tongue Every 8 (Eight) Hours As Needed for Nausea or Vomiting. 20 tablet 0    triamcinolone (KENALOG) 0.1 % cream Apply to affected area tid prn itch 45 g 0     No current facility-administered medications for this visit.       Review of Systems  Review of Systems   Constitutional:  Positive for fatigue.   HENT:  Positive for postnasal drip.    Endocrine: Negative for heat intolerance.   Psychiatric/Behavioral:  The patient is nervous/anxious.        Objective:     Vitals:    11/13/24 1357   BP: 138/60   Pulse: 91   Body mass index is 25.05 kg/m².    Physical Exam  Vitals reviewed.   Constitutional:       Appearance: Normal appearance.   Cardiovascular:      Rate and Rhythm: Normal rate and regular rhythm.      Pulses: Normal pulses.      Heart sounds: Normal heart sounds.   Pulmonary:      Effort: Pulmonary effort is normal.      Breath sounds: Normal breath sounds.   Musculoskeletal:         General: No swelling.   Neurological:      Mental Status: She is alert and oriented to person, place, and time.   Psychiatric:         Mood and Affect: Mood normal.         Thought Content: Thought content normal.         LABS AND IMAGING  Lab Results   Component Value Date    TSH 2.420 06/20/2024    X2DGSLD 10.4 10/22/2018    FREET4 0.92 06/20/2024               Assessment:        Problem List Items Addressed This Visit          Endocrine and Metabolic    Hypothyroidism - Primary    Relevant Medications    levothyroxine (Synthroid) 50 MCG tablet    Other Relevant Orders    TSH    T4, Free     Other Visit Diagnoses       Osteoarthritis of both knees, unspecified osteoarthritis type                       Plan:      Her previous labs reviewed and in the last year she had hyperthyroid state most of the time.  TSH was suppressed  since 08/2023. Last dose change - decrease to 75 mcg resulted in improved sx. Repeat thyroid function last week showed high TFT with TSH 0.03 and free T4 - 1.71.    Continue levothyroxine and reduce the dose to 50 mcg .  Repeat TFT in 6-8 weeks    Arthritis - voltaren gel given, supplements (Osteobiflex) recs provided.   F/u in  4-6 months.

## 2024-11-15 ENCOUNTER — OFFICE VISIT (OUTPATIENT)
Dept: INTERNAL MEDICINE | Facility: CLINIC | Age: 79
End: 2024-11-15
Payer: MEDICARE

## 2024-11-15 VITALS
OXYGEN SATURATION: 98 % | HEART RATE: 68 BPM | TEMPERATURE: 97 F | HEIGHT: 67 IN | DIASTOLIC BLOOD PRESSURE: 82 MMHG | WEIGHT: 160 LBS | SYSTOLIC BLOOD PRESSURE: 122 MMHG | BODY MASS INDEX: 25.11 KG/M2

## 2024-11-15 DIAGNOSIS — M25.562 BILATERAL CHRONIC KNEE PAIN: ICD-10-CM

## 2024-11-15 DIAGNOSIS — M25.561 BILATERAL CHRONIC KNEE PAIN: ICD-10-CM

## 2024-11-15 DIAGNOSIS — G89.29 BILATERAL CHRONIC KNEE PAIN: ICD-10-CM

## 2024-11-15 DIAGNOSIS — R35.0 URINARY FREQUENCY: ICD-10-CM

## 2024-11-15 DIAGNOSIS — E78.5 DYSLIPIDEMIA: Primary | Chronic | ICD-10-CM

## 2024-11-15 DIAGNOSIS — F41.9 ANXIETY: ICD-10-CM

## 2024-11-15 PROBLEM — N39.0 URINARY TRACT INFECTION WITH HEMATURIA: Status: RESOLVED | Noted: 2023-11-13 | Resolved: 2024-11-15

## 2024-11-15 PROBLEM — R31.9 URINARY TRACT INFECTION WITH HEMATURIA: Status: RESOLVED | Noted: 2023-11-13 | Resolved: 2024-11-15

## 2024-11-15 PROCEDURE — 1126F AMNT PAIN NOTED NONE PRSNT: CPT | Performed by: FAMILY MEDICINE

## 2024-11-15 PROCEDURE — G2211 COMPLEX E/M VISIT ADD ON: HCPCS | Performed by: FAMILY MEDICINE

## 2024-11-15 PROCEDURE — 1160F RVW MEDS BY RX/DR IN RCRD: CPT | Performed by: FAMILY MEDICINE

## 2024-11-15 PROCEDURE — 99214 OFFICE O/P EST MOD 30 MIN: CPT | Performed by: FAMILY MEDICINE

## 2024-11-15 PROCEDURE — 1159F MED LIST DOCD IN RCRD: CPT | Performed by: FAMILY MEDICINE

## 2024-11-15 RX ORDER — BUSPIRONE HYDROCHLORIDE 10 MG/1
10 TABLET ORAL 3 TIMES DAILY PRN
Qty: 180 TABLET | Refills: 3 | Status: SHIPPED | OUTPATIENT
Start: 2024-11-15

## 2024-11-15 RX ORDER — VENLAFAXINE HYDROCHLORIDE 150 MG/1
150 CAPSULE, EXTENDED RELEASE ORAL DAILY
COMMUNITY
End: 2024-11-15 | Stop reason: SDUPTHER

## 2024-11-15 RX ORDER — VENLAFAXINE HYDROCHLORIDE 150 MG/1
150 CAPSULE, EXTENDED RELEASE ORAL DAILY
Qty: 90 CAPSULE | Refills: 3 | Status: SHIPPED | OUTPATIENT
Start: 2024-11-15

## 2024-11-15 NOTE — PROGRESS NOTES
Daxa Reyes is a 78 y.o. female.    Chief Complaint   Patient presents with    Hyperlipidemia       HPI   History of Present Illness  The patient presents today to follow up on hyperlipidemia and urinary incontinence.    She continues to take her cholesterol medication, Zetia.  She follows a low-fat diet, but does admit to increased sugar consumption.  She does exercise regularly.    She has not yet started taking Myrbetriq for her bladder issues.  Continues to have trouble with urinary frequency.    She reports that her thyroid levels have been fluctuating, which has been causing her significant anxiety. She has not been taking BuSpar for a while and is considering resuming it. She is currently on Effexor 150 mg, which was increased from 75 mg. She has found BuSpar to be effective in managing her symptoms and improving her functionality.     She is experiencing joint pain, particularly in her knees, and is unsure if she is ready for a transplant. She is curious if inflammation could be causing her joint pain.  She is already taking turmeric.  She was advised by her endocrinologist to try a topical cream.    She reports mild shortness of breath, which her  has also noticed. She has a persistent runny nose and does not cough much. She has not followed up with an ENT specialist. She has been given steroids at urgent care and ENT, which she does not prefer. She had a CT scan of her sinuses.   She believes she may need a hearing aid. She reports no nausea, vomiting, or diarrhea. She has no issues with her ears, except for some hearing loss in one ear. She has undergone allergy testing in the past.    The following portions of the patient's history were reviewed and updated as appropriate: allergies, current medications, past family history, past medical history, past social history, past surgical history and problem list.     Allergies   Allergen Reactions    Crestor [Rosuvastatin] Myalgia    Lipitor  [Atorvastatin] Myalgia    Livalo [Pitavastatin] Dizziness         Current Outpatient Medications:     azelastine (ASTELIN) 0.1 % nasal spray, azelastine 137 mcg (0.1 %) nasal spray aerosol  1 spray each nostril BID, Disp: , Rfl:     budesonide (PULMICORT) 0.5 MG/2ML nebulizer solution, EMPTY CONTENTS OF 1 VIAL INTO NASAL IRRIGATION SYSTEM, ADD DISTILLED WATER, SALT PACK, MIX & IRRIGATE. PERFORM 1-2 TIMES DAILY, Disp: , Rfl:     co-enzyme Q-10 30 MG capsule, Take 5 capsules by mouth Daily., Disp: , Rfl:     Diclofenac Sodium (VOLTAREN) 1 % gel gel, Apply 4 g topically to the appropriate area as directed 3 (Three) Times a Day., Disp: 100 g, Rfl: 6    estradiol (CLIMARA) 0.0375 MG/24HR, Place 1 patch on the skin as directed by provider 1 (One) Time Per Week., Disp: 4 each, Rfl: 12    ezetimibe (ZETIA) 10 MG tablet, Take 1 tablet by mouth Daily., Disp: 90 tablet, Rfl: 3    famotidine (PEPCID) 40 MG tablet, 1 po daily prn itch, Disp: 30 tablet, Rfl: 0    fluticasone (FLONASE) 50 MCG/ACT nasal spray, Spray 1 spray every day by intranasal route., Disp: , Rfl:     Hypertonic Nasal Wash (Sinus Rinse Refill) pack, See Admin Instructions., Disp: , Rfl:     ibuprofen (ADVIL,MOTRIN) 600 MG tablet, Take 1 tablet by mouth Every 6 (Six) Hours As Needed for Mild Pain ., Disp: 20 tablet, Rfl: 0    levothyroxine (Synthroid) 50 MCG tablet, Take 1 tablet by mouth Daily., Disp: 90 tablet, Rfl: 3    loratadine (CLARITIN) 10 MG tablet, Take 1 tablet by mouth Daily., Disp: 90 tablet, Rfl: 1    Mirabegron ER (Myrbetriq) 50 MG tablet sustained-release 24 hour 24 hr tablet, Take 50 mg by mouth Daily., Disp: 90 tablet, Rfl: 1    ondansetron ODT (ZOFRAN-ODT) 4 MG disintegrating tablet, Place 1 tablet on the tongue Every 8 (Eight) Hours As Needed for Nausea or Vomiting., Disp: 20 tablet, Rfl: 0    triamcinolone (KENALOG) 0.1 % cream, Apply to affected area tid prn itch, Disp: 45 g, Rfl: 0    venlafaxine XR (EFFEXOR-XR) 150 MG 24 hr capsule, Take  "1 capsule by mouth Daily., Disp: 90 capsule, Rfl: 3    busPIRone (BUSPAR) 10 MG tablet, Take 1 tablet by mouth 3 (Three) Times a Day As Needed (anxiety)., Disp: 180 tablet, Rfl: 3    ROS    Review of Systems   Constitutional:  Negative for chills and fever.   HENT:  Positive for congestion, hearing loss and rhinorrhea.    Respiratory:  Negative for cough.    Cardiovascular:  Negative for chest pain.   Gastrointestinal:  Negative for diarrhea, nausea and vomiting.       Vitals:    11/15/24 1537   BP: 122/82   Pulse: 68   Temp: 97 °F (36.1 °C)   SpO2: 98%   Weight: 72.6 kg (160 lb)   Height: 170.2 cm (67.01\")   PainSc: 0-No pain         Physical Exam     Physical Exam  Constitutional:       General: She is not in acute distress.     Appearance: Normal appearance. She is well-developed.   HENT:      Head: Normocephalic and atraumatic.      Right Ear: Tympanic membrane and external ear normal.      Left Ear: Tympanic membrane and external ear normal.      Mouth/Throat:      Pharynx: Posterior oropharyngeal erythema (Postnasal drip) present.   Eyes:      Extraocular Movements: Extraocular movements intact.      Conjunctiva/sclera: Conjunctivae normal.   Cardiovascular:      Rate and Rhythm: Normal rate and regular rhythm.      Heart sounds: No murmur heard.  Pulmonary:      Effort: Pulmonary effort is normal. No respiratory distress.      Breath sounds: Normal breath sounds. No wheezing.   Abdominal:      General: Bowel sounds are normal. There is no distension.      Palpations: Abdomen is soft.      Tenderness: There is no abdominal tenderness.   Musculoskeletal:      Right lower leg: No edema.      Left lower leg: No edema.   Skin:     General: Skin is warm and dry.   Neurological:      Mental Status: She is alert and oriented to person, place, and time.      Cranial Nerves: No cranial nerve deficit.   Psychiatric:         Mood and Affect: Mood normal.         Behavior: Behavior normal.             Diagnoses and all " orders for this visit:    1. Dyslipidemia (Primary)    2. Urinary frequency    3. Bilateral chronic knee pain    4. Anxiety    Other orders  -     busPIRone (BUSPAR) 10 MG tablet; Take 1 tablet by mouth 3 (Three) Times a Day As Needed (anxiety).  Dispense: 180 tablet; Refill: 3  -     venlafaxine XR (EFFEXOR-XR) 150 MG 24 hr capsule; Take 1 capsule by mouth Daily.  Dispense: 90 capsule; Refill: 3        Assessment & Plan  1. Hyperlipidemia.  Her condition remains stable as per the latest lab results. Continuation of Zetia is advised.    2. Urinary frequency.  She continues to experience issues with urinary frequency and is not currently taking Myrbetriq. Encouragement was given to try Myrbetriq.    3. Chronic bilateral knee pain.  She was advised to apply Voltaren gel up to four times daily and consider taking Osteo Bi-Flex and turmeric.    4. Anxiety.  Her anxiety remains uncontrolled. Continuation of Effexor 150 mg is recommended. BuSpar 10 mg will be reintroduced, to be taken up to three times a day as needed.     Follow-up  Patient is scheduled for a follow-up visit in 6 months.    New Medications Ordered This Visit   Medications    busPIRone (BUSPAR) 10 MG tablet     Sig: Take 1 tablet by mouth 3 (Three) Times a Day As Needed (anxiety).     Dispense:  180 tablet     Refill:  3    venlafaxine XR (EFFEXOR-XR) 150 MG 24 hr capsule     Sig: Take 1 capsule by mouth Daily.     Dispense:  90 capsule     Refill:  3       No orders of the defined types were placed in this encounter.      Return in about 6 months (around 5/15/2025) for Medicare Wellness.    Haritha Del Toro DO

## 2024-11-25 ENCOUNTER — TELEPHONE (OUTPATIENT)
Dept: OBSTETRICS AND GYNECOLOGY | Facility: CLINIC | Age: 79
End: 2024-11-25
Payer: MEDICARE

## 2024-11-25 NOTE — TELEPHONE ENCOUNTER
----- Message from Luz LAMBERT sent at 11/25/2024  2:16 PM EST -----  Patient called and needs refill of Estradiol. Appointment made for December.    [facility]- Dr Humphreys

## 2024-11-26 RX ORDER — ESTRADIOL 0.04 MG/D
1 PATCH TRANSDERMAL WEEKLY
Qty: 12 EACH | Refills: 0 | Status: SHIPPED | OUTPATIENT
Start: 2024-11-26

## 2024-12-12 ENCOUNTER — OFFICE VISIT (OUTPATIENT)
Dept: OBSTETRICS AND GYNECOLOGY | Facility: CLINIC | Age: 79
End: 2024-12-12
Payer: MEDICARE

## 2024-12-12 VITALS
BODY MASS INDEX: 25.36 KG/M2 | SYSTOLIC BLOOD PRESSURE: 104 MMHG | HEIGHT: 67 IN | DIASTOLIC BLOOD PRESSURE: 60 MMHG | WEIGHT: 161.6 LBS

## 2024-12-12 DIAGNOSIS — Z12.11 SCREENING FOR COLON CANCER: ICD-10-CM

## 2024-12-12 DIAGNOSIS — Z01.419 ENCOUNTER FOR GYNECOLOGICAL EXAMINATION WITHOUT ABNORMAL FINDING: Primary | ICD-10-CM

## 2024-12-12 RX ORDER — ESTRADIOL 0.04 MG/D
1 PATCH TRANSDERMAL WEEKLY
Qty: 12 EACH | Refills: 4 | Status: SHIPPED | OUTPATIENT
Start: 2024-12-12

## 2024-12-12 NOTE — PROGRESS NOTES
Subjective   Chief Complaint   Patient presents with    Annual Exam     Annual with no pap smear     Daxa Reyes is a 78 y.o. year old No obstetric history on file..  No LMP recorded. Patient is postmenopausal.  She presents to be seen because of annual exam, HRT f/u. .     OTHER COMPLAINTS:  Nothing else    The following portions of the patient's history were reviewed and updated as appropriate:She  has a past medical history of Acute maxillary sinusitis, Anxiety, Arthritis, COPD (chronic obstructive pulmonary disease), Depression, Disc degeneration, lumbar, Disease of thyroid gland, Dyslipidemia, Easy bruising, Hypercholesterolemia, Hyperlipidemia, Hypothyroidism, Muscle weakness, Nervousness, Nicotine dependence, Osteoarthritis, Sleep apnea, Urinary frequency, Varicose vein, Varicosity, and Xerosis cutis.  She does not have any pertinent problems on file.  She  has a past surgical history that includes Thyroid surgery; Cosmetic surgery; Colonoscopy; Esophagogastroduodenoscopy; d & c hysteroscopy (N/A, 09/28/2021); and Total abdominal hysterectomy (11/2021).  Her family history includes Alcohol abuse in her father; Breast cancer in her maternal aunt; Hypertension in her mother; Other in her mother; Stroke in her mother.  She  reports that she quit smoking about 11 months ago. Her smoking use included cigarettes. She started smoking about 50 years ago. She has a 25 pack-year smoking history. She has never used smokeless tobacco. She reports current alcohol use. She reports that she does not use drugs.  Current Outpatient Medications   Medication Sig Dispense Refill    azelastine (ASTELIN) 0.1 % nasal spray azelastine 137 mcg (0.1 %) nasal spray aerosol   1 spray each nostril BID      budesonide (PULMICORT) 0.5 MG/2ML nebulizer solution EMPTY CONTENTS OF 1 VIAL INTO NASAL IRRIGATION SYSTEM, ADD DISTILLED WATER, SALT PACK, MIX & IRRIGATE. PERFORM 1-2 TIMES DAILY      busPIRone (BUSPAR) 10 MG tablet Take 1  tablet by mouth 3 (Three) Times a Day As Needed (anxiety). 180 tablet 3    co-enzyme Q-10 30 MG capsule Take 5 capsules by mouth Daily.      Diclofenac Sodium (VOLTAREN) 1 % gel gel Apply 4 g topically to the appropriate area as directed 3 (Three) Times a Day. 100 g 6    estradiol (CLIMARA) 0.0375 MG/24HR Place 1 patch on the skin as directed by provider 1 (One) Time Per Week. 12 each 0    ezetimibe (ZETIA) 10 MG tablet Take 1 tablet by mouth Daily. 90 tablet 3    fluticasone (FLONASE) 50 MCG/ACT nasal spray Spray 1 spray every day by intranasal route.      Hypertonic Nasal Wash (Sinus Rinse Refill) pack See Admin Instructions.      ibuprofen (ADVIL,MOTRIN) 600 MG tablet Take 1 tablet by mouth Every 6 (Six) Hours As Needed for Mild Pain . 20 tablet 0    levothyroxine (Synthroid) 50 MCG tablet Take 1 tablet by mouth Daily. 90 tablet 3    Mirabegron ER (Myrbetriq) 50 MG tablet sustained-release 24 hour 24 hr tablet Take 50 mg by mouth Daily. 90 tablet 1    triamcinolone (KENALOG) 0.1 % cream Apply to affected area tid prn itch 45 g 0    venlafaxine XR (EFFEXOR-XR) 150 MG 24 hr capsule Take 1 capsule by mouth Daily. 90 capsule 3     No current facility-administered medications for this visit.     Current Outpatient Medications on File Prior to Visit   Medication Sig    azelastine (ASTELIN) 0.1 % nasal spray azelastine 137 mcg (0.1 %) nasal spray aerosol   1 spray each nostril BID    budesonide (PULMICORT) 0.5 MG/2ML nebulizer solution EMPTY CONTENTS OF 1 VIAL INTO NASAL IRRIGATION SYSTEM, ADD DISTILLED WATER, SALT PACK, MIX & IRRIGATE. PERFORM 1-2 TIMES DAILY    busPIRone (BUSPAR) 10 MG tablet Take 1 tablet by mouth 3 (Three) Times a Day As Needed (anxiety).    co-enzyme Q-10 30 MG capsule Take 5 capsules by mouth Daily.    Diclofenac Sodium (VOLTAREN) 1 % gel gel Apply 4 g topically to the appropriate area as directed 3 (Three) Times a Day.    estradiol (CLIMARA) 0.0375 MG/24HR Place 1 patch on the skin as directed by  provider 1 (One) Time Per Week.    ezetimibe (ZETIA) 10 MG tablet Take 1 tablet by mouth Daily.    fluticasone (FLONASE) 50 MCG/ACT nasal spray Spray 1 spray every day by intranasal route.    Hypertonic Nasal Wash (Sinus Rinse Refill) pack See Admin Instructions.    ibuprofen (ADVIL,MOTRIN) 600 MG tablet Take 1 tablet by mouth Every 6 (Six) Hours As Needed for Mild Pain .    levothyroxine (Synthroid) 50 MCG tablet Take 1 tablet by mouth Daily.    Mirabegron ER (Myrbetriq) 50 MG tablet sustained-release 24 hour 24 hr tablet Take 50 mg by mouth Daily.    triamcinolone (KENALOG) 0.1 % cream Apply to affected area tid prn itch    venlafaxine XR (EFFEXOR-XR) 150 MG 24 hr capsule Take 1 capsule by mouth Daily.    [DISCONTINUED] famotidine (PEPCID) 40 MG tablet 1 po daily prn itch    [DISCONTINUED] loratadine (CLARITIN) 10 MG tablet Take 1 tablet by mouth Daily.    [DISCONTINUED] ondansetron ODT (ZOFRAN-ODT) 4 MG disintegrating tablet Place 1 tablet on the tongue Every 8 (Eight) Hours As Needed for Nausea or Vomiting.    [DISCONTINUED] levothyroxine sodium (TIROSINT) 137 MCG capsule Take 1 capsule every day by oral route.     No current facility-administered medications on file prior to visit.     She is allergic to crestor [rosuvastatin], lipitor [atorvastatin], and livalo [pitavastatin].    Social History    Tobacco Use      Smoking status: Former        Packs/day: 0.00        Years: 0.5 packs/day for 50.0 years (25.0 ttl pk-yrs)        Types: Cigarettes        Start date:         Quit date: 1/10/2024        Years since quittin.9      Smokeless tobacco: Never    Review of Systems  Consitutional POS: nothing reported    NEG: anorexia or night sweats   Gastointestinal POS: nothing reported    NEG: bloating, change in bowel habits, melena, or reflux symptoms   Genitourinary POS: nothing reported    NEG: dysuria or hematuria   Integument POS: nothing reported    NEG: moles that are changing in size, shape, color or  "rashes   Breast POS: nothing reported    NEG: persistent breast lump, skin dimpling, or nipple discharge         Respiratory: negative  Cardiovascular: negative  GYN:  negative          Objective   /60   Ht 170.2 cm (67\")   Wt 73.3 kg (161 lb 9.6 oz)   BMI 25.31 kg/m²     General:  well developed; well nourished  no acute distress  mentation appropriate   Skin:  No suspicious lesions seen   Thyroid: normal to inspection and palpation   Lungs:  breathing is unlabored  clear to auscultation bilaterally   Heart:  regular rate and rhythm, S1, S2 normal, no murmur, click, rub or gallop   Breasts:  Examined in supine position  Symmetric without masses or skin dimpling  Nipples normal without inversion, lesions or discharge  There are no palpable axillary nodes   Abdomen: soft, non-tender; no masses  no umbilical or inguinal hernias are present  no hepato-splenomegaly   Pelvis: Clinical staff was present for exam     Psychiatric: Alert and oriented ×3, mood and affect appropriate  HEENT: Atraumatic, normocephalic, normal scleral icterus  Extremities: 2+ pulses bilaterally, no edema      Lab Review   TSH    Imaging   MAMMO SCREENING DIGITAL TOMOSYNTHESIS BILATERAL W CAD (10/03/2024 15:33)   DEXA Bone Density Axial (05/22/2023 10:24)      Assessment   HRT  H/O Complex hyperplasia s/p TLH/BSO/LNS     Plan   May continue patch R/B A  MMG UTd and WNL  Cologarud  DEXA in the spring    No orders of the defined types were placed in this encounter.         This note was electronically signed.      December 12, 2024      "

## 2024-12-20 DIAGNOSIS — Z78.0 ASYMPTOMATIC MENOPAUSAL STATE: ICD-10-CM

## 2024-12-20 DIAGNOSIS — Z13.820 SCREENING FOR OSTEOPOROSIS: Primary | ICD-10-CM

## 2025-02-04 DIAGNOSIS — E03.9 ACQUIRED HYPOTHYROIDISM: ICD-10-CM

## 2025-02-14 ENCOUNTER — TELEPHONE (OUTPATIENT)
Dept: ENDOCRINOLOGY | Facility: CLINIC | Age: 80
End: 2025-02-14
Payer: MEDICARE

## 2025-02-14 NOTE — TELEPHONE ENCOUNTER
"Caller: Daxa Reyes \"BRIDGET\"    Relationship to patient: Self    Best call back number: [phone]     Patient is needing: PATIENT RETURNED MISSED CALL. AGENT ATTEMPTED TO TRANSFER AND WAS ADVISED BOTH [facility] AND [facility] WERE ON LUNCH. PLEASE CALL TO ADVISE ON LABS.           "

## 2025-02-26 ENCOUNTER — APPOINTMENT (OUTPATIENT)
Dept: BONE DENSITY | Facility: HOSPITAL | Age: 80
End: 2025-02-26
Payer: MEDICARE

## 2025-02-26 DIAGNOSIS — Z78.0 ASYMPTOMATIC MENOPAUSAL STATE: ICD-10-CM

## 2025-02-26 DIAGNOSIS — Z13.820 SCREENING FOR OSTEOPOROSIS: ICD-10-CM

## 2025-02-26 PROCEDURE — 77080 DXA BONE DENSITY AXIAL: CPT

## 2025-02-27 NOTE — PROGRESS NOTES
Please let patient know DEXA scan is stable.  Mild osteopenia noted in the hips however this is unchanged from 2 years ago.  Continue routine surveillance every 2 years.  Thanks

## 2025-03-28 ENCOUNTER — TELEPHONE (OUTPATIENT)
Dept: ENDOCRINOLOGY | Facility: CLINIC | Age: 80
End: 2025-03-28

## 2025-03-28 NOTE — TELEPHONE ENCOUNTER
"  Caller: Daxa Reyes \"BRIDGET\"    Relationship: Self    Best call back number:   Telephone Information:   Mobile [phone]     What is the best time to reach you: ANYTIME    Who are you requesting to speak with (clinical staff, provider,  specific staff member): CLINICAL STAFF / PROVIDER     What was the call regarding: PT CALLED WANTING TO CHANGE APPT TO MAY 5TH DUE TO PT A;READY HAVING AND APPT IN [place] THAT DAY, PROVIDER PUSHED OUT TO JULY. PLEASE ADVISE.     "

## 2025-05-15 ENCOUNTER — OFFICE VISIT (OUTPATIENT)
Dept: INTERNAL MEDICINE | Facility: CLINIC | Age: 80
End: 2025-05-15
Payer: MEDICARE

## 2025-05-15 VITALS
TEMPERATURE: 98 F | HEIGHT: 67 IN | HEART RATE: 76 BPM | SYSTOLIC BLOOD PRESSURE: 118 MMHG | BODY MASS INDEX: 25.74 KG/M2 | WEIGHT: 164 LBS | OXYGEN SATURATION: 98 % | DIASTOLIC BLOOD PRESSURE: 76 MMHG

## 2025-05-15 DIAGNOSIS — F41.9 ANXIETY: ICD-10-CM

## 2025-05-15 DIAGNOSIS — Z13.0 SCREENING FOR BLOOD DISEASE: ICD-10-CM

## 2025-05-15 DIAGNOSIS — E78.5 DYSLIPIDEMIA: Chronic | ICD-10-CM

## 2025-05-15 DIAGNOSIS — Z13.29 SCREENING FOR ENDOCRINE DISORDER: ICD-10-CM

## 2025-05-15 DIAGNOSIS — E03.9 ACQUIRED HYPOTHYROIDISM: ICD-10-CM

## 2025-05-15 DIAGNOSIS — E55.9 VITAMIN D DEFICIENCY: ICD-10-CM

## 2025-05-15 DIAGNOSIS — Z00.00 MEDICARE ANNUAL WELLNESS VISIT, SUBSEQUENT: Primary | ICD-10-CM

## 2025-05-15 RX ORDER — EZETIMIBE 10 MG/1
10 TABLET ORAL DAILY
Qty: 90 TABLET | Refills: 3 | Status: SHIPPED | OUTPATIENT
Start: 2025-05-15

## 2025-05-15 RX ORDER — MIRABEGRON 50 MG/1
50 TABLET, FILM COATED, EXTENDED RELEASE ORAL DAILY
Qty: 90 TABLET | Refills: 1 | Status: SHIPPED | OUTPATIENT
Start: 2025-05-15

## 2025-05-15 NOTE — PROGRESS NOTES
Subjective   The ABCs of the Annual Wellness Visit  Medicare Wellness Visit      Daxa Reyes is a 79 y.o. patient who presents for a Medicare Wellness Visit.    The following portions of the patient's history were reviewed and   updated as appropriate: allergies, current medications, past family history, past medical history, past social history, past surgical history, and problem list.    Compared to one year ago, the patient's physical   health is worse.  Compared to one year ago, the patient's mental   health is worse.    Recent Hospitalizations:  She was not admitted to the hospital during the last year.     Current Medical Providers:  Patient Care Team:  Haritha Del Toro DO as PCP - General (Family Medicine)  Jacquie Hung MD as Consulting Physician (Endocrinology)    Outpatient Medications Prior to Visit   Medication Sig Dispense Refill    azelastine (ASTELIN) 0.1 % nasal spray azelastine 137 mcg (0.1 %) nasal spray aerosol   1 spray each nostril BID      budesonide (PULMICORT) 0.5 MG/2ML nebulizer solution EMPTY CONTENTS OF 1 VIAL INTO NASAL IRRIGATION SYSTEM, ADD DISTILLED WATER, SALT PACK, MIX & IRRIGATE. PERFORM 1-2 TIMES DAILY      busPIRone (BUSPAR) 10 MG tablet Take 1 tablet by mouth 3 (Three) Times a Day As Needed (anxiety). 180 tablet 3    co-enzyme Q-10 30 MG capsule Take 5 capsules by mouth Daily.      Diclofenac Sodium (VOLTAREN) 1 % gel gel Apply 4 g topically to the appropriate area as directed 3 (Three) Times a Day. 100 g 6    estradiol (CLIMARA) 0.0375 MG/24HR Place 1 patch on the skin as directed by provider 1 (One) Time Per Week. 12 each 4    fluticasone (FLONASE) 50 MCG/ACT nasal spray Spray 1 spray every day by intranasal route.      Hypertonic Nasal Wash (Sinus Rinse Refill) pack See Admin Instructions.      ibuprofen (ADVIL,MOTRIN) 600 MG tablet Take 1 tablet by mouth Every 6 (Six) Hours As Needed for Mild Pain . 20 tablet 0    levothyroxine (Synthroid) 50 MCG tablet Take  "1 tablet by mouth Daily. 90 tablet 3    triamcinolone (KENALOG) 0.1 % cream Apply to affected area tid prn itch 45 g 0    venlafaxine XR (EFFEXOR-XR) 150 MG 24 hr capsule Take 1 capsule by mouth Daily. 90 capsule 3    ezetimibe (ZETIA) 10 MG tablet Take 1 tablet by mouth Daily. 90 tablet 3    Mirabegron ER (Myrbetriq) 50 MG tablet sustained-release 24 hour 24 hr tablet Take 50 mg by mouth Daily. 90 tablet 1     No facility-administered medications prior to visit.     No opioid medication identified on active medication list. I have reviewed chart for other potential  high risk medication/s and harmful drug interactions in the elderly.      Aspirin is not on active medication list.  Aspirin use is not indicated based on review of current medical condition/s. Risk of harm outweighs potential benefits.  .    Patient Active Problem List   Diagnosis    Centrilobular emphysema    Hypothyroidism    Vitamin D deficiency    Dyslipidemia    ANA LAURA (obstructive sleep apnea)    Vertigo    History of arthritis    History of osteoarthritis    Renal disorder    Xerosis cutis    Anxiety    Depressed    Numbness in feet    Varicose veins of both lower extremities    Environmental allergies    Medicare annual wellness visit, subsequent    Complex atypical endometrial hyperplasia    Urinary frequency    Memory changes    Nasal congestion    Vomiting and diarrhea     Advance Care Planning Advance Directive is not on file.  ACP discussion was held with the patient during this visit. Patient does not have an advance directive, information provided.            Objective   Vitals:    05/15/25 1605   BP: 118/76   Pulse: 76   Temp: 98 °F (36.7 °C)   SpO2: 98%   Weight: 74.4 kg (164 lb)   Height: 170.2 cm (67\")   PainSc: 2        Estimated body mass index is 25.69 kg/m² as calculated from the following:    Height as of this encounter: 170.2 cm (67\").    Weight as of this encounter: 74.4 kg (164 lb).                Does the patient have evidence " of cognitive impairment? Yes, increased confusion with uncontrolled anxiety                                                                                                Health  Risk Assessment    Smoking Status:  Social History     Tobacco Use   Smoking Status Some Days    Current packs/day: 0.00    Average packs/day: 0.5 packs/day for 50.0 years (25.0 ttl pk-yrs)    Types: Cigarettes    Start date:     Last attempt to quit: 1/10/2024    Years since quittin.3   Smokeless Tobacco Never     Alcohol Consumption:  Social History     Substance and Sexual Activity   Alcohol Use Yes    Comment: occasional       Fall Risk Screen  STEADI Fall Risk Assessment was completed, and patient is at HIGH risk for falls. Assessment completed on:5/15/2025    Depression Screening   Little interest or pleasure in doing things? Not at all   Feeling down, depressed, or hopeless? Not at all   PHQ-2 Total Score 0      Health Habits and Functional and Cognitive Screenin/15/2025     4:13 PM   Functional & Cognitive Status   Do you have difficulty preparing food and eating? No   Do you have difficulty bathing yourself, getting dressed or grooming yourself? No   Do you have difficulty using the toilet? No   Do you have difficulty moving around from place to place? No   Do you have trouble with steps or getting out of a bed or a chair? No   Current Diet Well Balanced Diet   Dental Exam Up to date   Eye Exam Up to date   Exercise (times per week) 3 times per week   Current Exercises Include Walking;Home Fitness Gym   Do you need help using the phone?  No   Are you deaf or do you have serious difficulty hearing?  No   Do you need help to go to places out of walking distance? No   Do you need help shopping? No   Do you need help preparing meals?  No   Do you need help with housework?  No   Do you need help with laundry? No   Do you need help taking your medications? No   Do you need help managing money? No   Do you ever drive or  ride in a car without wearing a seat belt? No   Have you felt unusual stress, anger or loneliness in the last month? No   Who do you live with? Spouse   If you need help, do you have trouble finding someone available to you? No   Have you been bothered in the last four weeks by sexual problems? No   Do you have difficulty concentrating, remembering or making decisions? No           Age-appropriate Screening Schedule:  Refer to the list below for future screening recommendations based on patient's age, sex and/or medical conditions. Orders for these recommended tests are listed in the plan section. The patient has been provided with a written plan.    Health Maintenance List  Health Maintenance   Topic Date Due    TDAP/TD VACCINES (2 - Td or Tdap) 10/09/2022    ANNUAL WELLNESS VISIT  05/13/2025    RSV Vaccine - Adults (1 - 1-dose 75+ series) 01/09/2026 (Originally 12/14/2019)    COVID-19 Vaccine (4 - 2024-25 season) 01/10/2026 (Originally 9/1/2024)    INFLUENZA VACCINE  07/01/2025    DXA SCAN  02/26/2027    COLORECTAL CANCER SCREENING  01/02/2028    Pneumococcal Vaccine 50+  Completed    ZOSTER VACCINE  Completed    MAMMOGRAM  Discontinued    LUNG CANCER SCREENING  Discontinued                                                                                                                                                CMS Preventative Services Quick Reference  Risk Factors Identified During Encounter  Depression/Dysphoria/Anxiety: Current medication adjusted.  Follow up visit planned.    The above risks/problems have been discussed with the patient.  Pertinent information has been shared with the patient in the After Visit Summary.  An After Visit Summary and PPPS were made available to the patient.    Follow Up:   Next Medicare Wellness visit to be scheduled in 1 year.         Additional E&M Note during same encounter follows:  Patient has additional, significant, and separately identifiable condition(s)/problem(s)  that require work above and beyond the Medicare Wellness Visit     Chief Complaint  Medicare Wellness-subsequent (AWV and preventative exam.  )    Subjective   HPI  BRIDGET is also being seen today for an annual adult preventative physical exam.  and BRIDGET is also being seen today for additional medical problem/s.  She reports that she has been very nervous and anxious here lately.  This has been ongoing for a while, but has gotten so bad to the point that her  has to take care of her medications for her.  She gets nervous when attending her exercise class and does not want to talk to anybody.  She is startled easily by loud noises or if someone comes up behind her unknowingly.  She is taking Effexor, but reports that it may not be at the same time every day.  She is not consistently taking BuSpar.  However, she reports that previously when she is taking duloxetine and BuSpar regularly, the BuSpar worked significantly within a week period of time, controlling her anxiety.  She is confused about several diagnoses including possible sleep apnea that is no longer present as well as COPD that is no longer present.  She has chronic sinus congestion that is repeatedly treated with steroids, but has not followed up with her ENT provider in a couple of years.  She is seeing endocrinology for hypothyroidism and is now concerned with possible elevated cortisol.  Her daughter feels that her cortisol level being elevated may be exacerbating her stress level and contributing to weight gain.  She had lost weight previously, but had gained it all back.  She reports that she does not have much of an appetite but continues to gain weight.  She has gained approximately 11 pounds in the last year.  She has an upcoming appointment with endocrinology.    Review of Systems   Constitutional:  Positive for appetite change. Negative for chills and fever.   HENT:  Positive for congestion.    Respiratory:  Negative for shortness of  "breath.    Cardiovascular:  Negative for chest pain.   Gastrointestinal:  Negative for constipation and diarrhea.   Genitourinary:  Positive for frequency.   Neurological:  Positive for confusion.   Psychiatric/Behavioral:  The patient is nervous/anxious.               Objective   Vital Signs:  /76   Pulse 76   Temp 98 °F (36.7 °C)   Ht 170.2 cm (67\")   Wt 74.4 kg (164 lb)   SpO2 98%   BMI 25.69 kg/m²   Physical Exam  Constitutional:       General: She is not in acute distress.     Appearance: Normal appearance. She is well-developed.   HENT:      Head: Normocephalic and atraumatic.      Right Ear: Tympanic membrane and external ear normal.      Left Ear: Tympanic membrane and external ear normal.      Mouth/Throat:      Pharynx: No posterior oropharyngeal erythema.   Eyes:      Extraocular Movements: Extraocular movements intact.      Conjunctiva/sclera: Conjunctivae normal.      Pupils: Pupils are equal, round, and reactive to light.   Neck:      Vascular: No carotid bruit.   Cardiovascular:      Rate and Rhythm: Normal rate and regular rhythm.      Heart sounds: No murmur heard.  Pulmonary:      Effort: Pulmonary effort is normal. No respiratory distress.      Breath sounds: Normal breath sounds. No wheezing.   Abdominal:      General: Bowel sounds are normal. There is no distension.      Palpations: Abdomen is soft.      Tenderness: There is no abdominal tenderness.   Musculoskeletal:      Cervical back: Neck supple.      Right lower leg: No edema.      Left lower leg: No edema.   Lymphadenopathy:      Cervical: No cervical adenopathy.   Skin:     General: Skin is warm and dry.   Neurological:      Mental Status: She is alert and oriented to person, place, and time.      Cranial Nerves: No cranial nerve deficit.      Deep Tendon Reflexes: Reflexes normal.   Psychiatric:         Mood and Affect: Mood is anxious. Affect is tearful.         Behavior: Behavior normal.                Assessment and Plan "     Diagnoses and all orders for this visit:    1. Medicare annual wellness visit, subsequent (Primary)    2. Anxiety    3. Dyslipidemia  Assessment & Plan:           Orders:  -     Lipid Panel    4. Vitamin D deficiency  Assessment & Plan:           Orders:  -     Vitamin D,25-Hydroxy    5. Screening for blood disease  -     CBC & Differential    6. Screening for endocrine disorder  -     Comprehensive Metabolic Panel    7. Acquired hypothyroidism    Other orders  -     ezetimibe (ZETIA) 10 MG tablet; Take 1 tablet by mouth Daily.  Dispense: 90 tablet; Refill: 3  -     Mirabegron ER (Myrbetriq) 50 MG tablet sustained-release 24 hour 24 hr tablet; Take 50 mg by mouth Daily.  Dispense: 90 tablet; Refill: 1  -     Tdap (ADACEL) 5-2-15.5 LF-MCG/0.5 injection; Inject 0.5 mL into the appropriate muscle as directed by prescriber 1 (One) Time for 1 dose.  Dispense: 0.5 mL; Refill: 0      Discussed with the patient routine health maintenance including: Vaccines, dental/eye health, healthy diet and exercise.  Mental health has been addressed today as well.  Routine labs have been ordered.  We did discuss RSV vaccine as well as Tdap.  She reports previously not being able to receive Tdap through her pharmacy.  Prescription order has been placed so there will be no question about her receiving the vaccine through the pharmacy.    In regards to uncontrolled anxiety, advised to consistently take Effexor at the same time every day.  She has not been taking BuSpar regularly.  Encouraged to take 1 BuSpar with Effexor and another 1 before bed.  She will follow-up with me in 1 month.  Patient was initially frustrated with her visit as she was previously informed that I would not be ordering cortisol.  She was advised that she does have an endocrinologist already for her thyroid.  Advised that if she has concerns with abnormal cortisol level, this should be discussed with endocrinology, as they would be the one to manage any sort of  cortisol abnormalities.  She reports that she was previously told by endocrinology front office staff that her PCP could order the lab.  However, I advised that she may have the lab order done by her specialist if they see fit along with her routine thyroid studies.  Advised to have both endocrinology labs and mild labs completed at the same time to avoid 2 co-pays in 2 blood draws.  Patient is understanding of this.    She has also been advised to take 1000 IUs of vitamin D daily.  She may purchase this over-the-counter.  She asked about magnesium as well.  Advised not to take more than 400 mg of magnesium daily.  Medication instructions have been written down for the patient so there would not be any confusion.  Discussed with the patient that I do understand her frustration with hearing multiple things from different offices regarding her health causing increased confusion and anxiety.    I spent 62 minutes caring for Daxa on this date of service. This time includes time spent by me in the following activities: preparing for the visit, reviewing tests, performing a medically appropriate examination and/or evaluation, counseling and educating the patient/family/caregiver, ordering medications, tests, or procedures, and documenting information in the medical record.  Follow Up   Return in about 1 month (around 6/15/2025) for anxiety .  Patient was given instructions and counseling regarding her condition or for health maintenance advice. Please see specific information pulled into the AVS if appropriate.

## 2025-05-22 ENCOUNTER — TELEPHONE (OUTPATIENT)
Dept: ENDOCRINOLOGY | Facility: CLINIC | Age: 80
End: 2025-05-22
Payer: MEDICARE

## 2025-05-22 DIAGNOSIS — E03.9 ACQUIRED HYPOTHYROIDISM: Primary | ICD-10-CM

## 2025-05-22 NOTE — TELEPHONE ENCOUNTER
Called the pt and she stated she went to see her PCP. While at her PCP's she had an anxiety attack. PCP told her they think it is her thyroid. She said she is staying nervous and wants to clean all the time.     Her grandsons girlfriend told her she thinks she needs to go on anxiety meds.    Pt would like to know about testing her thyroid and adrenal gland for abnormalities.    She has appt 6-4-25.    Please advise.

## 2025-05-22 NOTE — TELEPHONE ENCOUNTER
It is possible that her thyroid levels are still high. We have reduced the dose last time and levels normalized in February. It is possible that she has abnormal thyroid again.   We can test the levels when she is here or I can preorder labs to be done in [place] few days before the visit to review results when she is here.

## 2025-05-23 ENCOUNTER — LAB (OUTPATIENT)
Dept: LAB | Facility: HOSPITAL | Age: 80
End: 2025-05-23
Payer: MEDICARE

## 2025-05-23 DIAGNOSIS — E03.9 ACQUIRED HYPOTHYROIDISM: ICD-10-CM

## 2025-05-23 LAB
ALBUMIN SERPL-MCNC: 3.9 G/DL (ref 3.5–5.2)
ALBUMIN/GLOB SERPL: 1.3 G/DL
ALP SERPL-CCNC: 83 U/L (ref 39–117)
ALT SERPL W P-5'-P-CCNC: 9 U/L (ref 1–33)
ANION GAP SERPL CALCULATED.3IONS-SCNC: 8 MMOL/L (ref 5–15)
AST SERPL-CCNC: 23 U/L (ref 1–32)
BASOPHILS # BLD AUTO: 0.03 10*3/MM3 (ref 0–0.2)
BASOPHILS NFR BLD AUTO: 0.6 % (ref 0–1.5)
BILIRUB SERPL-MCNC: 0.6 MG/DL (ref 0–1.2)
BUN SERPL-MCNC: 12 MG/DL (ref 8–23)
BUN/CREAT SERPL: 14 (ref 7–25)
CALCIUM SPEC-SCNC: 8.7 MG/DL (ref 8.6–10.5)
CHLORIDE SERPL-SCNC: 106 MMOL/L (ref 98–107)
CHOLEST SERPL-MCNC: 239 MG/DL (ref 0–200)
CO2 SERPL-SCNC: 26 MMOL/L (ref 22–29)
CREAT SERPL-MCNC: 0.86 MG/DL (ref 0.57–1)
DEPRECATED RDW RBC AUTO: 42.3 FL (ref 37–54)
EGFRCR SERPLBLD CKD-EPI 2021: 68.4 ML/MIN/1.73
EOSINOPHIL # BLD AUTO: 0.19 10*3/MM3 (ref 0–0.4)
EOSINOPHIL NFR BLD AUTO: 3.7 % (ref 0.3–6.2)
ERYTHROCYTE [DISTWIDTH] IN BLOOD BY AUTOMATED COUNT: 12.6 % (ref 12.3–15.4)
GLOBULIN UR ELPH-MCNC: 3 GM/DL
GLUCOSE SERPL-MCNC: 86 MG/DL (ref 65–99)
HCT VFR BLD AUTO: 37.2 % (ref 34–46.6)
HDLC SERPL-MCNC: 49 MG/DL (ref 40–60)
HGB BLD-MCNC: 12.7 G/DL (ref 12–15.9)
IMM GRANULOCYTES # BLD AUTO: 0.01 10*3/MM3 (ref 0–0.05)
IMM GRANULOCYTES NFR BLD AUTO: 0.2 % (ref 0–0.5)
LDLC SERPL CALC-MCNC: 169 MG/DL (ref 0–100)
LDLC/HDLC SERPL: 3.4 {RATIO}
LYMPHOCYTES # BLD AUTO: 1.56 10*3/MM3 (ref 0.7–3.1)
LYMPHOCYTES NFR BLD AUTO: 30.2 % (ref 19.6–45.3)
MCH RBC QN AUTO: 31.3 PG (ref 26.6–33)
MCHC RBC AUTO-ENTMCNC: 34.1 G/DL (ref 31.5–35.7)
MCV RBC AUTO: 91.6 FL (ref 79–97)
MONOCYTES # BLD AUTO: 0.33 10*3/MM3 (ref 0.1–0.9)
MONOCYTES NFR BLD AUTO: 6.4 % (ref 5–12)
NEUTROPHILS NFR BLD AUTO: 3.04 10*3/MM3 (ref 1.7–7)
NEUTROPHILS NFR BLD AUTO: 58.9 % (ref 42.7–76)
NRBC BLD AUTO-RTO: 0 /100 WBC (ref 0–0.2)
PLATELET # BLD AUTO: 222 10*3/MM3 (ref 140–450)
PMV BLD AUTO: 11.3 FL (ref 6–12)
POTASSIUM SERPL-SCNC: 4.2 MMOL/L (ref 3.5–5.2)
PROT SERPL-MCNC: 6.9 G/DL (ref 6–8.5)
RBC # BLD AUTO: 4.06 10*6/MM3 (ref 3.77–5.28)
SODIUM SERPL-SCNC: 140 MMOL/L (ref 136–145)
T3 SERPL-MCNC: 69.9 NG/DL (ref 80–200)
T4 FREE SERPL-MCNC: 0.97 NG/DL (ref 0.92–1.68)
TRIGL SERPL-MCNC: 117 MG/DL (ref 0–150)
TSH SERPL DL<=0.05 MIU/L-ACNC: 5.19 UIU/ML (ref 0.27–4.2)
VLDLC SERPL-MCNC: 21 MG/DL (ref 5–40)
WBC NRBC COR # BLD AUTO: 5.16 10*3/MM3 (ref 3.4–10.8)

## 2025-05-23 PROCEDURE — 84439 ASSAY OF FREE THYROXINE: CPT | Performed by: INTERNAL MEDICINE

## 2025-05-23 PROCEDURE — 84480 ASSAY TRIIODOTHYRONINE (T3): CPT

## 2025-05-23 PROCEDURE — 80061 LIPID PANEL: CPT | Performed by: INTERNAL MEDICINE

## 2025-05-23 PROCEDURE — 82306 VITAMIN D 25 HYDROXY: CPT | Performed by: FAMILY MEDICINE

## 2025-05-23 PROCEDURE — 84443 ASSAY THYROID STIM HORMONE: CPT | Performed by: INTERNAL MEDICINE

## 2025-05-23 PROCEDURE — 80053 COMPREHEN METABOLIC PANEL: CPT | Performed by: INTERNAL MEDICINE

## 2025-05-23 PROCEDURE — 85025 COMPLETE CBC W/AUTO DIFF WBC: CPT | Performed by: FAMILY MEDICINE

## 2025-05-24 LAB — 25(OH)D3 SERPL-MCNC: 22.3 NG/ML (ref 30–100)

## 2025-06-04 ENCOUNTER — OFFICE VISIT (OUTPATIENT)
Dept: ENDOCRINOLOGY | Facility: CLINIC | Age: 80
End: 2025-06-04
Payer: MEDICARE

## 2025-06-04 VITALS
HEART RATE: 74 BPM | OXYGEN SATURATION: 95 % | HEIGHT: 67 IN | DIASTOLIC BLOOD PRESSURE: 59 MMHG | SYSTOLIC BLOOD PRESSURE: 120 MMHG | BODY MASS INDEX: 25.58 KG/M2 | WEIGHT: 163 LBS

## 2025-06-04 DIAGNOSIS — E55.9 VITAMIN D DEFICIENCY, UNSPECIFIED: ICD-10-CM

## 2025-06-04 DIAGNOSIS — E03.9 ACQUIRED HYPOTHYROIDISM: Primary | ICD-10-CM

## 2025-06-04 PROCEDURE — 99214 OFFICE O/P EST MOD 30 MIN: CPT | Performed by: INTERNAL MEDICINE

## 2025-06-04 RX ORDER — CREAM BASE NO.37
CREAM (GRAM) TOPICAL NIGHTLY
COMMUNITY
Start: 2025-05-29

## 2025-06-04 RX ORDER — FLUOCINONIDE TOPICAL SOLUTION USP, 0.05% 0.5 MG/ML
SOLUTION TOPICAL 2 TIMES DAILY
COMMUNITY
Start: 2025-05-29

## 2025-06-04 NOTE — PROGRESS NOTES
Subjective:   Acquired hypothyroidism (Follow up/Cortisol?)        Daxa Reyes is a 79 y.o. female who is being seen for follow-up   Hypothyroidism  - postoperative. Patient had subtotal thyroidectomy years ago and took thyroid medication since then.   Current levothyroxine is 50 mcg daily since 3/2024    She reported anxiety and fear of going to the public place, anxiety about driving and being on time, occasional difficulty sleeping. She also noted increased fatigue and lethargy. She also has panic attacks.        Current medications:  Current Outpatient Medications   Medication Sig Dispense Refill    Anhydrous Base (Anhydrous Cream Base) cream Every Night. Tetrinoin Aloe Vera 0.025% 1% Anhydrous Cream      azelastine (ASTELIN) 0.1 % nasal spray azelastine 137 mcg (0.1 %) nasal spray aerosol   1 spray each nostril BID      budesonide (PULMICORT) 0.5 MG/2ML nebulizer solution EMPTY CONTENTS OF 1 VIAL INTO NASAL IRRIGATION SYSTEM, ADD DISTILLED WATER, SALT PACK, MIX & IRRIGATE. PERFORM 1-2 TIMES DAILY      busPIRone (BUSPAR) 10 MG tablet Take 1 tablet by mouth 3 (Three) Times a Day As Needed (anxiety). 180 tablet 3    co-enzyme Q-10 30 MG capsule Take 5 capsules by mouth Daily.      Diclofenac Sodium (VOLTAREN) 1 % gel gel Apply 4 g topically to the appropriate area as directed 3 (Three) Times a Day. 100 g 6    estradiol (CLIMARA) 0.0375 MG/24HR Place 1 patch on the skin as directed by provider 1 (One) Time Per Week. 12 each 4    ezetimibe (ZETIA) 10 MG tablet Take 1 tablet by mouth Daily. 90 tablet 3    fluocinonide (LIDEX) 0.05 % external solution Apply  topically to the appropriate area as directed 2 (Two) Times a Day.      fluticasone (FLONASE) 50 MCG/ACT nasal spray Spray 1 spray every day by intranasal route.      Hypertonic Nasal Wash (Sinus Rinse Refill) pack See Admin Instructions.      ibuprofen (ADVIL,MOTRIN) 600 MG tablet Take 1 tablet by mouth Every 6 (Six) Hours As Needed for Mild Pain . 20  tablet 0    levothyroxine (Synthroid) 50 MCG tablet Take 1 tablet by mouth Daily. 90 tablet 3    Mirabegron ER (Myrbetriq) 50 MG tablet sustained-release 24 hour 24 hr tablet Take 50 mg by mouth Daily. 90 tablet 1    triamcinolone (KENALOG) 0.1 % cream Apply to affected area tid prn itch 45 g 0    venlafaxine XR (EFFEXOR-XR) 150 MG 24 hr capsule Take 1 capsule by mouth Daily. 90 capsule 3     No current facility-administered medications for this visit.       Review of Systems  Review of Systems   Constitutional:  Positive for fatigue.   HENT:  Positive for postnasal drip.    Endocrine: Negative for heat intolerance.   Psychiatric/Behavioral:  The patient is nervous/anxious.        Objective:     Vitals:    06/04/25 1410   BP: 120/59   Pulse: 74   SpO2: 95%   Body mass index is 25.53 kg/m².    Physical Exam  Vitals reviewed.   Constitutional:       Appearance: Normal appearance.   Cardiovascular:      Rate and Rhythm: Normal rate and regular rhythm.      Pulses: Normal pulses.      Heart sounds: Normal heart sounds.   Pulmonary:      Effort: Pulmonary effort is normal.      Breath sounds: Normal breath sounds.   Musculoskeletal:         General: No swelling.   Neurological:      Mental Status: She is alert and oriented to person, place, and time.   Psychiatric:         Mood and Affect: Mood normal.         Thought Content: Thought content normal.         LABS AND IMAGING  Lab Results   Component Value Date    TSH 5.190 (H) 05/23/2025    M2BMHBH 69.9 (L) 05/23/2025    R3VYRXU 10.4 10/22/2018    FREET4 0.97 05/23/2025               Assessment:        Problem List Items Addressed This Visit          Endocrine and Metabolic    Hypothyroidism - Primary    Relevant Orders    Comprehensive Metabolic Panel    TSH    T4, Free    Vitamin D,25-Hydroxy    Cortisol     Other Visit Diagnoses         Vitamin D deficiency, unspecified        Relevant Orders    Vitamin D,25-Hydroxy    Cortisol                   Plan:      Her  previous labs reviewed and in the last year she had hyperthyroid state most of the time.  TSH was suppressed since 08/2023. Last dose change - decrease to 50 mcg resulted in improved sx. Repeat thyroid function 2 weeks ago showed low thyroid function with TSH of 5.1. her sx of anxiety are still present.     Continue levothyroxine 50 mcg .  Repeat TFT in 6-8 weeks. I have added cortisol evaluation as per patient request    Anxiety is poorly controlled. Cont Effexor and Buspar tid. Her sx do not follow the hypothyroid picture.   Cont to follow with PCP for management of anxiety.     F/u in  4-6 months.

## 2025-08-13 ENCOUNTER — PATIENT ROUNDING (BHMG ONLY) (OUTPATIENT)
Dept: URGENT CARE | Facility: CLINIC | Age: 80
End: 2025-08-13
Payer: MEDICARE

## 2025-08-18 ENCOUNTER — LAB (OUTPATIENT)
Dept: LAB | Facility: HOSPITAL | Age: 80
End: 2025-08-18
Payer: MEDICARE

## 2025-08-18 DIAGNOSIS — E03.9 ACQUIRED HYPOTHYROIDISM: ICD-10-CM

## 2025-08-18 DIAGNOSIS — E55.9 VITAMIN D DEFICIENCY, UNSPECIFIED: ICD-10-CM

## 2025-08-18 LAB
25(OH)D3 SERPL-MCNC: 35.7 NG/ML (ref 30–100)
ALBUMIN SERPL-MCNC: 4 G/DL (ref 3.5–5.2)
ALBUMIN/GLOB SERPL: 1.2 G/DL
ALP SERPL-CCNC: 76 U/L (ref 39–117)
ALT SERPL W P-5'-P-CCNC: 11 U/L (ref 1–33)
ANION GAP SERPL CALCULATED.3IONS-SCNC: 12.1 MMOL/L (ref 5–15)
AST SERPL-CCNC: 22 U/L (ref 1–32)
BILIRUB SERPL-MCNC: 0.8 MG/DL (ref 0–1.2)
BUN SERPL-MCNC: 13 MG/DL (ref 8–23)
BUN/CREAT SERPL: 15.7 (ref 7–25)
CALCIUM SPEC-SCNC: 9.4 MG/DL (ref 8.6–10.5)
CHLORIDE SERPL-SCNC: 106 MMOL/L (ref 98–107)
CO2 SERPL-SCNC: 21.9 MMOL/L (ref 22–29)
CORTIS SERPL-MCNC: 9.47 MCG/DL
CREAT SERPL-MCNC: 0.83 MG/DL (ref 0.57–1)
EGFRCR SERPLBLD CKD-EPI 2021: 71.4 ML/MIN/1.73
GLOBULIN UR ELPH-MCNC: 3.3 GM/DL
GLUCOSE SERPL-MCNC: 86 MG/DL (ref 65–99)
POTASSIUM SERPL-SCNC: 4.2 MMOL/L (ref 3.5–5.2)
PROT SERPL-MCNC: 7.3 G/DL (ref 6–8.5)
SODIUM SERPL-SCNC: 140 MMOL/L (ref 136–145)
T4 FREE SERPL-MCNC: 0.91 NG/DL (ref 0.92–1.68)
TSH SERPL DL<=0.05 MIU/L-ACNC: 15.5 UIU/ML (ref 0.27–4.2)

## 2025-08-18 PROCEDURE — 82533 TOTAL CORTISOL: CPT

## 2025-08-18 PROCEDURE — 36415 COLL VENOUS BLD VENIPUNCTURE: CPT

## 2025-08-18 PROCEDURE — 84439 ASSAY OF FREE THYROXINE: CPT

## 2025-08-18 PROCEDURE — 84443 ASSAY THYROID STIM HORMONE: CPT

## 2025-08-18 PROCEDURE — 82306 VITAMIN D 25 HYDROXY: CPT

## 2025-08-18 PROCEDURE — 80053 COMPREHEN METABOLIC PANEL: CPT

## 2025-08-21 ENCOUNTER — TELEPHONE (OUTPATIENT)
Dept: ENDOCRINOLOGY | Facility: CLINIC | Age: 80
End: 2025-08-21
Payer: MEDICARE

## 2025-08-21 ENCOUNTER — RESULTS FOLLOW-UP (OUTPATIENT)
Dept: ENDOCRINOLOGY | Facility: CLINIC | Age: 80
End: 2025-08-21
Payer: MEDICARE

## 2025-08-24 RX ORDER — LEVOTHYROXINE SODIUM 75 UG/1
75 TABLET ORAL DAILY
Qty: 90 TABLET | Refills: 3 | Status: SHIPPED | OUTPATIENT
Start: 2025-08-24 | End: 2026-08-24

## 2025-08-26 RX ORDER — LEVOTHYROXINE SODIUM 75 UG/1
75 TABLET ORAL DAILY
Qty: 90 TABLET | Refills: 1 | Status: SHIPPED | OUTPATIENT
Start: 2025-08-26

## (undated) DEVICE — PREMIUM WET SKIN PREP TRAY CHG: Brand: MEDLINE INDUSTRIES, INC.

## (undated) DEVICE — SOL NACL 0.9PCT 1000ML

## (undated) DEVICE — ST IRR CYSTO W/SPK 77IN LF

## (undated) DEVICE — RICH MINOR LITHOTOMY: Brand: MEDLINE INDUSTRIES, INC.

## (undated) DEVICE — SLV SCD CALF HEMOFORCE DVT THERP REPROC MD

## (undated) DEVICE — SOL IRR H2O BTL 1000ML STRL

## (undated) DEVICE — GLV SURG BIOGEL M LTX PF 8

## (undated) DEVICE — SUT GUT CHRM 2/0 SH 27IN G123H